# Patient Record
Sex: MALE | Race: ASIAN | NOT HISPANIC OR LATINO | URBAN - METROPOLITAN AREA
[De-identification: names, ages, dates, MRNs, and addresses within clinical notes are randomized per-mention and may not be internally consistent; named-entity substitution may affect disease eponyms.]

---

## 2023-01-01 ENCOUNTER — INPATIENT (INPATIENT)
Facility: HOSPITAL | Age: 68
LOS: 5 days | DRG: 871 | End: 2023-08-27
Attending: STUDENT IN AN ORGANIZED HEALTH CARE EDUCATION/TRAINING PROGRAM | Admitting: STUDENT IN AN ORGANIZED HEALTH CARE EDUCATION/TRAINING PROGRAM
Payer: COMMERCIAL

## 2023-01-01 VITALS
HEIGHT: 69 IN | RESPIRATION RATE: 18 BRPM | WEIGHT: 121.25 LBS | DIASTOLIC BLOOD PRESSURE: 60 MMHG | TEMPERATURE: 98 F | HEART RATE: 96 BPM | SYSTOLIC BLOOD PRESSURE: 87 MMHG | OXYGEN SATURATION: 98 %

## 2023-01-01 DIAGNOSIS — C22.0 LIVER CELL CARCINOMA: ICD-10-CM

## 2023-01-01 DIAGNOSIS — E78.5 HYPERLIPIDEMIA, UNSPECIFIED: ICD-10-CM

## 2023-01-01 DIAGNOSIS — N17.9 ACUTE KIDNEY FAILURE, UNSPECIFIED: ICD-10-CM

## 2023-01-01 DIAGNOSIS — I10 ESSENTIAL (PRIMARY) HYPERTENSION: ICD-10-CM

## 2023-01-01 DIAGNOSIS — K92.1 MELENA: ICD-10-CM

## 2023-01-01 DIAGNOSIS — C22.9 MALIGNANT NEOPLASM OF LIVER, NOT SPECIFIED AS PRIMARY OR SECONDARY: ICD-10-CM

## 2023-01-01 DIAGNOSIS — I50.9 HEART FAILURE, UNSPECIFIED: ICD-10-CM

## 2023-01-01 DIAGNOSIS — Z87.898 PERSONAL HISTORY OF OTHER SPECIFIED CONDITIONS: ICD-10-CM

## 2023-01-01 DIAGNOSIS — A41.9 SEPSIS, UNSPECIFIED ORGANISM: ICD-10-CM

## 2023-01-01 DIAGNOSIS — E87.1 HYPO-OSMOLALITY AND HYPONATREMIA: ICD-10-CM

## 2023-01-01 DIAGNOSIS — Z29.9 ENCOUNTER FOR PROPHYLACTIC MEASURES, UNSPECIFIED: ICD-10-CM

## 2023-01-01 DIAGNOSIS — K74.60 UNSPECIFIED CIRRHOSIS OF LIVER: ICD-10-CM

## 2023-01-01 DIAGNOSIS — R06.2 WHEEZING: ICD-10-CM

## 2023-01-01 DIAGNOSIS — Z87.19 PERSONAL HISTORY OF OTHER DISEASES OF THE DIGESTIVE SYSTEM: Chronic | ICD-10-CM

## 2023-01-01 DIAGNOSIS — E11.9 TYPE 2 DIABETES MELLITUS WITHOUT COMPLICATIONS: ICD-10-CM

## 2023-01-01 LAB
A1C WITH ESTIMATED AVERAGE GLUCOSE RESULT: 9.4 % — HIGH (ref 4–5.6)
AFP-TM SERPL-MCNC: 4.2 NG/ML — SIGNIFICANT CHANGE UP
ALBUMIN FLD-MCNC: 0.9 G/DL — SIGNIFICANT CHANGE UP
ALBUMIN SERPL ELPH-MCNC: 1.7 G/DL — LOW (ref 3.3–5)
ALBUMIN SERPL ELPH-MCNC: 1.9 G/DL — LOW (ref 3.3–5)
ALBUMIN SERPL ELPH-MCNC: 2 G/DL — LOW (ref 3.3–5)
ALBUMIN SERPL ELPH-MCNC: 2.1 G/DL — LOW (ref 3.3–5)
ALBUMIN SERPL ELPH-MCNC: 2.2 G/DL — LOW (ref 3.3–5)
ALP SERPL-CCNC: 110 U/L — SIGNIFICANT CHANGE UP (ref 40–120)
ALP SERPL-CCNC: 120 U/L — SIGNIFICANT CHANGE UP (ref 40–120)
ALP SERPL-CCNC: 129 U/L — HIGH (ref 40–120)
ALP SERPL-CCNC: 133 U/L — HIGH (ref 40–120)
ALP SERPL-CCNC: 138 U/L — HIGH (ref 40–120)
ALP SERPL-CCNC: 151 U/L — HIGH (ref 40–120)
ALP SERPL-CCNC: 163 U/L — HIGH (ref 40–120)
ALP SERPL-CCNC: 178 U/L — HIGH (ref 40–120)
ALP SERPL-CCNC: 186 U/L — HIGH (ref 40–120)
ALT FLD-CCNC: 1105 U/L — HIGH (ref 12–78)
ALT FLD-CCNC: 2307 U/L — HIGH (ref 12–78)
ALT FLD-CCNC: 270 U/L — HIGH (ref 12–78)
ALT FLD-CCNC: 30 U/L — SIGNIFICANT CHANGE UP (ref 12–78)
ALT FLD-CCNC: 32 U/L — SIGNIFICANT CHANGE UP (ref 12–78)
ALT FLD-CCNC: 35 U/L — SIGNIFICANT CHANGE UP (ref 12–78)
ALT FLD-CCNC: 36 U/L — SIGNIFICANT CHANGE UP (ref 12–78)
ALT FLD-CCNC: 36 U/L — SIGNIFICANT CHANGE UP (ref 12–78)
ALT FLD-CCNC: 699 U/L — HIGH (ref 12–78)
AMMONIA BLD-MCNC: 175 UMOL/L — HIGH (ref 11–32)
ANION GAP SERPL CALC-SCNC: 10 MMOL/L — SIGNIFICANT CHANGE UP (ref 5–17)
ANION GAP SERPL CALC-SCNC: 13 MMOL/L — SIGNIFICANT CHANGE UP (ref 5–17)
ANION GAP SERPL CALC-SCNC: 15 MMOL/L — SIGNIFICANT CHANGE UP (ref 5–17)
ANION GAP SERPL CALC-SCNC: 15 MMOL/L — SIGNIFICANT CHANGE UP (ref 5–17)
ANION GAP SERPL CALC-SCNC: 16 MMOL/L — SIGNIFICANT CHANGE UP (ref 5–17)
ANION GAP SERPL CALC-SCNC: 17 MMOL/L — SIGNIFICANT CHANGE UP (ref 5–17)
ANION GAP SERPL CALC-SCNC: 6 MMOL/L — SIGNIFICANT CHANGE UP (ref 5–17)
ANION GAP SERPL CALC-SCNC: 6 MMOL/L — SIGNIFICANT CHANGE UP (ref 5–17)
ANION GAP SERPL CALC-SCNC: 7 MMOL/L — SIGNIFICANT CHANGE UP (ref 5–17)
ANION GAP SERPL CALC-SCNC: 8 MMOL/L — SIGNIFICANT CHANGE UP (ref 5–17)
ANION GAP SERPL CALC-SCNC: 8 MMOL/L — SIGNIFICANT CHANGE UP (ref 5–17)
APPEARANCE UR: CLEAR — SIGNIFICANT CHANGE UP
APPEARANCE UR: CLEAR — SIGNIFICANT CHANGE UP
APTT BLD: 26.8 SEC — SIGNIFICANT CHANGE UP (ref 24.5–35.6)
APTT BLD: 35.7 SEC — HIGH (ref 24.5–35.6)
AST SERPL-CCNC: 2350 U/L — HIGH (ref 15–37)
AST SERPL-CCNC: 3962 U/L — HIGH (ref 15–37)
AST SERPL-CCNC: 51 U/L — HIGH (ref 15–37)
AST SERPL-CCNC: 51 U/L — HIGH (ref 15–37)
AST SERPL-CCNC: 52 U/L — HIGH (ref 15–37)
AST SERPL-CCNC: 54 U/L — HIGH (ref 15–37)
AST SERPL-CCNC: 57 U/L — HIGH (ref 15–37)
AST SERPL-CCNC: 761 U/L — HIGH (ref 15–37)
AST SERPL-CCNC: 9511 U/L — HIGH (ref 15–37)
B PERT IGG+IGM PNL SER: ABNORMAL
BASE EXCESS BLDA CALC-SCNC: -11.6 MMOL/L — LOW (ref -2–3)
BASE EXCESS BLDA CALC-SCNC: -15.8 MMOL/L — LOW (ref -2–3)
BASE EXCESS BLDA CALC-SCNC: -9.7 MMOL/L — LOW (ref -2–3)
BASOPHILS # BLD AUTO: 0 K/UL — SIGNIFICANT CHANGE UP (ref 0–0.2)
BASOPHILS # BLD AUTO: 0.03 K/UL — SIGNIFICANT CHANGE UP (ref 0–0.2)
BASOPHILS # BLD AUTO: 0.1 K/UL — SIGNIFICANT CHANGE UP (ref 0–0.2)
BASOPHILS # BLD AUTO: 0.13 K/UL — SIGNIFICANT CHANGE UP (ref 0–0.2)
BASOPHILS # BLD AUTO: 0.13 K/UL — SIGNIFICANT CHANGE UP (ref 0–0.2)
BASOPHILS # BLD AUTO: 0.15 K/UL — SIGNIFICANT CHANGE UP (ref 0–0.2)
BASOPHILS NFR BLD AUTO: 0 % — SIGNIFICANT CHANGE UP (ref 0–2)
BASOPHILS NFR BLD AUTO: 0.1 % — SIGNIFICANT CHANGE UP (ref 0–2)
BASOPHILS NFR BLD AUTO: 0.6 % — SIGNIFICANT CHANGE UP (ref 0–2)
BASOPHILS NFR BLD AUTO: 0.9 % — SIGNIFICANT CHANGE UP (ref 0–2)
BASOPHILS NFR BLD AUTO: 0.9 % — SIGNIFICANT CHANGE UP (ref 0–2)
BASOPHILS NFR BLD AUTO: 1.2 % — SIGNIFICANT CHANGE UP (ref 0–2)
BILIRUB SERPL-MCNC: 0.6 MG/DL — SIGNIFICANT CHANGE UP (ref 0.2–1.2)
BILIRUB SERPL-MCNC: 0.8 MG/DL — SIGNIFICANT CHANGE UP (ref 0.2–1.2)
BILIRUB SERPL-MCNC: 0.9 MG/DL — SIGNIFICANT CHANGE UP (ref 0.2–1.2)
BILIRUB SERPL-MCNC: 1.1 MG/DL — SIGNIFICANT CHANGE UP (ref 0.2–1.2)
BILIRUB UR-MCNC: ABNORMAL
BILIRUB UR-MCNC: NEGATIVE — SIGNIFICANT CHANGE UP
BLOOD GAS COMMENTS ARTERIAL: SIGNIFICANT CHANGE UP
BUN SERPL-MCNC: 25 MG/DL — HIGH (ref 7–23)
BUN SERPL-MCNC: 25 MG/DL — HIGH (ref 7–23)
BUN SERPL-MCNC: 26 MG/DL — HIGH (ref 7–23)
BUN SERPL-MCNC: 32 MG/DL — HIGH (ref 7–23)
BUN SERPL-MCNC: 39 MG/DL — HIGH (ref 7–23)
BUN SERPL-MCNC: 42 MG/DL — HIGH (ref 7–23)
BUN SERPL-MCNC: 51 MG/DL — HIGH (ref 7–23)
BUN SERPL-MCNC: 68 MG/DL — HIGH (ref 7–23)
BUN SERPL-MCNC: 71 MG/DL — HIGH (ref 7–23)
BUN SERPL-MCNC: 71 MG/DL — HIGH (ref 7–23)
BUN SERPL-MCNC: 74 MG/DL — HIGH (ref 7–23)
CALCIUM SERPL-MCNC: 7.7 MG/DL — LOW (ref 8.5–10.1)
CALCIUM SERPL-MCNC: 8 MG/DL — LOW (ref 8.5–10.1)
CALCIUM SERPL-MCNC: 8.1 MG/DL — LOW (ref 8.5–10.1)
CALCIUM SERPL-MCNC: 8.2 MG/DL — LOW (ref 8.5–10.1)
CALCIUM SERPL-MCNC: 8.3 MG/DL — LOW (ref 8.5–10.1)
CALCIUM SERPL-MCNC: 8.4 MG/DL — LOW (ref 8.5–10.1)
CALCIUM SERPL-MCNC: 8.5 MG/DL — SIGNIFICANT CHANGE UP (ref 8.5–10.1)
CALCIUM SERPL-MCNC: 8.7 MG/DL — SIGNIFICANT CHANGE UP (ref 8.5–10.1)
CALCIUM SERPL-MCNC: 9 MG/DL — SIGNIFICANT CHANGE UP (ref 8.5–10.1)
CHLORIDE SERPL-SCNC: 100 MMOL/L — SIGNIFICANT CHANGE UP (ref 96–108)
CHLORIDE SERPL-SCNC: 100 MMOL/L — SIGNIFICANT CHANGE UP (ref 96–108)
CHLORIDE SERPL-SCNC: 89 MMOL/L — LOW (ref 96–108)
CHLORIDE SERPL-SCNC: 93 MMOL/L — LOW (ref 96–108)
CHLORIDE SERPL-SCNC: 94 MMOL/L — LOW (ref 96–108)
CHLORIDE SERPL-SCNC: 95 MMOL/L — LOW (ref 96–108)
CHLORIDE SERPL-SCNC: 98 MMOL/L — SIGNIFICANT CHANGE UP (ref 96–108)
CHOLEST SERPL-MCNC: 97 MG/DL — SIGNIFICANT CHANGE UP
CO2 SERPL-SCNC: 11 MMOL/L — LOW (ref 22–31)
CO2 SERPL-SCNC: 12 MMOL/L — LOW (ref 22–31)
CO2 SERPL-SCNC: 16 MMOL/L — LOW (ref 22–31)
CO2 SERPL-SCNC: 18 MMOL/L — LOW (ref 22–31)
CO2 SERPL-SCNC: 19 MMOL/L — LOW (ref 22–31)
CO2 SERPL-SCNC: 20 MMOL/L — LOW (ref 22–31)
CO2 SERPL-SCNC: 22 MMOL/L — SIGNIFICANT CHANGE UP (ref 22–31)
CO2 SERPL-SCNC: 22 MMOL/L — SIGNIFICANT CHANGE UP (ref 22–31)
CO2 SERPL-SCNC: 24 MMOL/L — SIGNIFICANT CHANGE UP (ref 22–31)
COLOR FLD: YELLOW — SIGNIFICANT CHANGE UP
COLOR SPEC: SIGNIFICANT CHANGE UP
COLOR SPEC: SIGNIFICANT CHANGE UP
CREAT ?TM UR-MCNC: 143 MG/DL — SIGNIFICANT CHANGE UP
CREAT SERPL-MCNC: 1.4 MG/DL — HIGH (ref 0.5–1.3)
CREAT SERPL-MCNC: 1.5 MG/DL — HIGH (ref 0.5–1.3)
CREAT SERPL-MCNC: 1.7 MG/DL — HIGH (ref 0.5–1.3)
CREAT SERPL-MCNC: 1.8 MG/DL — HIGH (ref 0.5–1.3)
CREAT SERPL-MCNC: 1.8 MG/DL — HIGH (ref 0.5–1.3)
CREAT SERPL-MCNC: 1.9 MG/DL — HIGH (ref 0.5–1.3)
CREAT SERPL-MCNC: 2.1 MG/DL — HIGH (ref 0.5–1.3)
CREAT SERPL-MCNC: 2.7 MG/DL — HIGH (ref 0.5–1.3)
CREAT SERPL-MCNC: 3.3 MG/DL — HIGH (ref 0.5–1.3)
CREAT SERPL-MCNC: 3.6 MG/DL — HIGH (ref 0.5–1.3)
CREAT SERPL-MCNC: 3.8 MG/DL — HIGH (ref 0.5–1.3)
DIFF PNL FLD: NEGATIVE — SIGNIFICANT CHANGE UP
DIFF PNL FLD: NEGATIVE — SIGNIFICANT CHANGE UP
DIGOXIN SERPL-MCNC: 0.5 NG/ML — LOW (ref 0.8–2)
DIGOXIN SERPL-MCNC: 0.8 NG/ML — SIGNIFICANT CHANGE UP (ref 0.8–2)
DIGOXIN SERPL-MCNC: 1 NG/ML — SIGNIFICANT CHANGE UP (ref 0.8–2)
EGFR: 17 ML/MIN/1.73M2 — LOW
EGFR: 18 ML/MIN/1.73M2 — LOW
EGFR: 20 ML/MIN/1.73M2 — LOW
EGFR: 25 ML/MIN/1.73M2 — LOW
EGFR: 34 ML/MIN/1.73M2 — LOW
EGFR: 38 ML/MIN/1.73M2 — LOW
EGFR: 41 ML/MIN/1.73M2 — LOW
EGFR: 41 ML/MIN/1.73M2 — LOW
EGFR: 44 ML/MIN/1.73M2 — LOW
EGFR: 51 ML/MIN/1.73M2 — LOW
EGFR: 55 ML/MIN/1.73M2 — LOW
EOSINOPHIL # BLD AUTO: 0.03 K/UL — SIGNIFICANT CHANGE UP (ref 0–0.5)
EOSINOPHIL # BLD AUTO: 0.09 K/UL — SIGNIFICANT CHANGE UP (ref 0–0.5)
EOSINOPHIL # BLD AUTO: 0.21 K/UL — SIGNIFICANT CHANGE UP (ref 0–0.5)
EOSINOPHIL # BLD AUTO: 0.31 K/UL — SIGNIFICANT CHANGE UP (ref 0–0.5)
EOSINOPHIL # BLD AUTO: 0.35 K/UL — SIGNIFICANT CHANGE UP (ref 0–0.5)
EOSINOPHIL # BLD AUTO: 0.38 K/UL — SIGNIFICANT CHANGE UP (ref 0–0.5)
EOSINOPHIL # BLD AUTO: 0.84 K/UL — HIGH (ref 0–0.5)
EOSINOPHIL # BLD AUTO: 1.12 K/UL — HIGH (ref 0–0.5)
EOSINOPHIL # FLD: 0 % — SIGNIFICANT CHANGE UP
EOSINOPHIL NFR BLD AUTO: 0.2 % — SIGNIFICANT CHANGE UP (ref 0–6)
EOSINOPHIL NFR BLD AUTO: 0.4 % — SIGNIFICANT CHANGE UP (ref 0–6)
EOSINOPHIL NFR BLD AUTO: 1 % — SIGNIFICANT CHANGE UP (ref 0–6)
EOSINOPHIL NFR BLD AUTO: 2 % — SIGNIFICANT CHANGE UP (ref 0–6)
EOSINOPHIL NFR BLD AUTO: 2.4 % — SIGNIFICANT CHANGE UP (ref 0–6)
EOSINOPHIL NFR BLD AUTO: 2.5 % — SIGNIFICANT CHANGE UP (ref 0–6)
EOSINOPHIL NFR BLD AUTO: 6 % — SIGNIFICANT CHANGE UP (ref 0–6)
EOSINOPHIL NFR BLD AUTO: 8 % — HIGH (ref 0–6)
ESTIMATED AVERAGE GLUCOSE: 223 MG/DL — HIGH (ref 68–114)
FLUAV AG NPH QL: SIGNIFICANT CHANGE UP
FLUBV AG NPH QL: SIGNIFICANT CHANGE UP
FLUID INTAKE SUBSTANCE CLASS: SIGNIFICANT CHANGE UP
FOLATE+VIT B12 SERBLD-IMP: 0 % — SIGNIFICANT CHANGE UP
GAS PNL BLDA: SIGNIFICANT CHANGE UP
GLUCOSE SERPL-MCNC: 138 MG/DL — HIGH (ref 70–99)
GLUCOSE SERPL-MCNC: 150 MG/DL — HIGH (ref 70–99)
GLUCOSE SERPL-MCNC: 154 MG/DL — HIGH (ref 70–99)
GLUCOSE SERPL-MCNC: 161 MG/DL — HIGH (ref 70–99)
GLUCOSE SERPL-MCNC: 171 MG/DL — HIGH (ref 70–99)
GLUCOSE SERPL-MCNC: 195 MG/DL — HIGH (ref 70–99)
GLUCOSE SERPL-MCNC: 195 MG/DL — HIGH (ref 70–99)
GLUCOSE SERPL-MCNC: 279 MG/DL — HIGH (ref 70–99)
GLUCOSE SERPL-MCNC: 362 MG/DL — HIGH (ref 70–99)
GLUCOSE SERPL-MCNC: 450 MG/DL — CRITICAL HIGH (ref 70–99)
GLUCOSE SERPL-MCNC: 48 MG/DL — CRITICAL LOW (ref 70–99)
GLUCOSE UR QL: 250 MG/DL
GLUCOSE UR QL: >=1000 MG/DL
GRAM STN FLD: SIGNIFICANT CHANGE UP
HCO3 BLDA-SCNC: 14 MMOL/L — LOW (ref 21–28)
HCO3 BLDA-SCNC: 17 MMOL/L — LOW (ref 21–28)
HCO3 BLDA-SCNC: 19 MMOL/L — LOW (ref 21–28)
HCT VFR BLD CALC: 35.5 % — LOW (ref 39–50)
HCT VFR BLD CALC: 37.2 % — LOW (ref 39–50)
HCT VFR BLD CALC: 38.3 % — LOW (ref 39–50)
HCT VFR BLD CALC: 39.8 % — SIGNIFICANT CHANGE UP (ref 39–50)
HCT VFR BLD CALC: 41.8 % — SIGNIFICANT CHANGE UP (ref 39–50)
HCT VFR BLD CALC: 42.2 % — SIGNIFICANT CHANGE UP (ref 39–50)
HCT VFR BLD CALC: 43.9 % — SIGNIFICANT CHANGE UP (ref 39–50)
HCT VFR BLD CALC: 44.1 % — SIGNIFICANT CHANGE UP (ref 39–50)
HCT VFR BLD CALC: 46.5 % — SIGNIFICANT CHANGE UP (ref 39–50)
HCV AB S/CO SERPL IA: 0.28 S/CO — SIGNIFICANT CHANGE UP (ref 0–0.99)
HCV AB SERPL-IMP: SIGNIFICANT CHANGE UP
HDLC SERPL-MCNC: 34 MG/DL — LOW
HGB BLD-MCNC: 11.6 G/DL — LOW (ref 13–17)
HGB BLD-MCNC: 12.1 G/DL — LOW (ref 13–17)
HGB BLD-MCNC: 12.9 G/DL — LOW (ref 13–17)
HGB BLD-MCNC: 12.9 G/DL — LOW (ref 13–17)
HGB BLD-MCNC: 14.1 G/DL — SIGNIFICANT CHANGE UP (ref 13–17)
HGB BLD-MCNC: 14.3 G/DL — SIGNIFICANT CHANGE UP (ref 13–17)
HGB BLD-MCNC: 14.3 G/DL — SIGNIFICANT CHANGE UP (ref 13–17)
HGB BLD-MCNC: 14.6 G/DL — SIGNIFICANT CHANGE UP (ref 13–17)
HGB BLD-MCNC: 15.4 G/DL — SIGNIFICANT CHANGE UP (ref 13–17)
HOROWITZ INDEX BLDA+IHG-RTO: 100 — SIGNIFICANT CHANGE UP
HOROWITZ INDEX BLDA+IHG-RTO: 32 — SIGNIFICANT CHANGE UP
IMM GRANULOCYTES NFR BLD AUTO: 0.5 % — SIGNIFICANT CHANGE UP (ref 0–0.9)
IMM GRANULOCYTES NFR BLD AUTO: 0.6 % — SIGNIFICANT CHANGE UP (ref 0–0.9)
IMM GRANULOCYTES NFR BLD AUTO: 0.9 % — SIGNIFICANT CHANGE UP (ref 0–0.9)
IMM GRANULOCYTES NFR BLD AUTO: 6.3 % — HIGH (ref 0–0.9)
IMM GRANULOCYTES NFR BLD AUTO: 7.9 % — HIGH (ref 0–0.9)
INR BLD: 1.11 RATIO — SIGNIFICANT CHANGE UP (ref 0.85–1.18)
INR BLD: 1.11 RATIO — SIGNIFICANT CHANGE UP (ref 0.85–1.18)
INR BLD: 2.04 RATIO — HIGH (ref 0.85–1.18)
KETONES UR-MCNC: NEGATIVE MG/DL — SIGNIFICANT CHANGE UP
KETONES UR-MCNC: NEGATIVE MG/DL — SIGNIFICANT CHANGE UP
LACTATE SERPL-SCNC: 1.7 MMOL/L — SIGNIFICANT CHANGE UP (ref 0.7–2)
LACTATE SERPL-SCNC: 3 MMOL/L — HIGH (ref 0.7–2)
LACTATE SERPL-SCNC: 7.3 MMOL/L — CRITICAL HIGH (ref 0.7–2)
LACTATE SERPL-SCNC: 9 MMOL/L — CRITICAL HIGH (ref 0.7–2)
LACTATE SERPL-SCNC: 9.1 MMOL/L — CRITICAL HIGH (ref 0.7–2)
LDH SERPL L TO P-CCNC: 157 U/L — SIGNIFICANT CHANGE UP
LEUKOCYTE ESTERASE UR-ACNC: ABNORMAL
LEUKOCYTE ESTERASE UR-ACNC: NEGATIVE — SIGNIFICANT CHANGE UP
LIDOCAIN IGE QN: 303 U/L — SIGNIFICANT CHANGE UP (ref 73–393)
LIPID PNL WITH DIRECT LDL SERPL: 47 MG/DL — SIGNIFICANT CHANGE UP
LYMPHOCYTES # BLD AUTO: 1.46 K/UL — SIGNIFICANT CHANGE UP (ref 1–3.3)
LYMPHOCYTES # BLD AUTO: 1.48 K/UL — SIGNIFICANT CHANGE UP (ref 1–3.3)
LYMPHOCYTES # BLD AUTO: 1.65 K/UL — SIGNIFICANT CHANGE UP (ref 1–3.3)
LYMPHOCYTES # BLD AUTO: 1.67 K/UL — SIGNIFICANT CHANGE UP (ref 1–3.3)
LYMPHOCYTES # BLD AUTO: 1.82 K/UL — SIGNIFICANT CHANGE UP (ref 1–3.3)
LYMPHOCYTES # BLD AUTO: 1.95 K/UL — SIGNIFICANT CHANGE UP (ref 1–3.3)
LYMPHOCYTES # BLD AUTO: 10 % — LOW (ref 13–44)
LYMPHOCYTES # BLD AUTO: 13 % — SIGNIFICANT CHANGE UP (ref 13–44)
LYMPHOCYTES # BLD AUTO: 13 % — SIGNIFICANT CHANGE UP (ref 13–44)
LYMPHOCYTES # BLD AUTO: 13.2 % — SIGNIFICANT CHANGE UP (ref 13–44)
LYMPHOCYTES # BLD AUTO: 13.9 % — SIGNIFICANT CHANGE UP (ref 13–44)
LYMPHOCYTES # BLD AUTO: 2.1 K/UL — SIGNIFICANT CHANGE UP (ref 1–3.3)
LYMPHOCYTES # BLD AUTO: 2.46 K/UL — SIGNIFICANT CHANGE UP (ref 1–3.3)
LYMPHOCYTES # BLD AUTO: 8 % — LOW (ref 13–44)
LYMPHOCYTES # BLD AUTO: 8.1 % — LOW (ref 13–44)
LYMPHOCYTES # BLD AUTO: 9.5 % — LOW (ref 13–44)
LYMPHOCYTES # FLD: 18 % — SIGNIFICANT CHANGE UP
MAGNESIUM SERPL-MCNC: 2.3 MG/DL — SIGNIFICANT CHANGE UP (ref 1.6–2.6)
MAGNESIUM SERPL-MCNC: 2.4 MG/DL — SIGNIFICANT CHANGE UP (ref 1.6–2.6)
MAGNESIUM SERPL-MCNC: 2.6 MG/DL — SIGNIFICANT CHANGE UP (ref 1.6–2.6)
MAGNESIUM SERPL-MCNC: 3.1 MG/DL — HIGH (ref 1.6–2.6)
MAGNESIUM SERPL-MCNC: 3.5 MG/DL — HIGH (ref 1.6–2.6)
MCHC RBC-ENTMCNC: 26.9 PG — LOW (ref 27–34)
MCHC RBC-ENTMCNC: 27 PG — SIGNIFICANT CHANGE UP (ref 27–34)
MCHC RBC-ENTMCNC: 27.1 PG — SIGNIFICANT CHANGE UP (ref 27–34)
MCHC RBC-ENTMCNC: 27.2 PG — SIGNIFICANT CHANGE UP (ref 27–34)
MCHC RBC-ENTMCNC: 27.3 PG — SIGNIFICANT CHANGE UP (ref 27–34)
MCHC RBC-ENTMCNC: 32.4 GM/DL — SIGNIFICANT CHANGE UP (ref 32–36)
MCHC RBC-ENTMCNC: 32.5 GM/DL — SIGNIFICANT CHANGE UP (ref 32–36)
MCHC RBC-ENTMCNC: 32.6 GM/DL — SIGNIFICANT CHANGE UP (ref 32–36)
MCHC RBC-ENTMCNC: 32.7 GM/DL — SIGNIFICANT CHANGE UP (ref 32–36)
MCHC RBC-ENTMCNC: 33.1 GM/DL — SIGNIFICANT CHANGE UP (ref 32–36)
MCHC RBC-ENTMCNC: 33.1 GM/DL — SIGNIFICANT CHANGE UP (ref 32–36)
MCHC RBC-ENTMCNC: 33.7 GM/DL — SIGNIFICANT CHANGE UP (ref 32–36)
MCHC RBC-ENTMCNC: 33.7 GM/DL — SIGNIFICANT CHANGE UP (ref 32–36)
MCHC RBC-ENTMCNC: 33.9 GM/DL — SIGNIFICANT CHANGE UP (ref 32–36)
MCV RBC AUTO: 79.6 FL — LOW (ref 80–100)
MCV RBC AUTO: 80.5 FL — SIGNIFICANT CHANGE UP (ref 80–100)
MCV RBC AUTO: 80.6 FL — SIGNIFICANT CHANGE UP (ref 80–100)
MCV RBC AUTO: 81.8 FL — SIGNIFICANT CHANGE UP (ref 80–100)
MCV RBC AUTO: 82.2 FL — SIGNIFICANT CHANGE UP (ref 80–100)
MCV RBC AUTO: 83.1 FL — SIGNIFICANT CHANGE UP (ref 80–100)
MCV RBC AUTO: 83.3 FL — SIGNIFICANT CHANGE UP (ref 80–100)
MCV RBC AUTO: 83.3 FL — SIGNIFICANT CHANGE UP (ref 80–100)
MCV RBC AUTO: 83.4 FL — SIGNIFICANT CHANGE UP (ref 80–100)
MELD SCORE WITH DIALYSIS: 29 POINTS — SIGNIFICANT CHANGE UP
MELD SCORE WITHOUT DIALYSIS: 24 POINTS — SIGNIFICANT CHANGE UP
MESOTHL CELL # FLD: 5 % — SIGNIFICANT CHANGE UP
MONOCYTES # BLD AUTO: 0.57 K/UL — SIGNIFICANT CHANGE UP (ref 0–0.9)
MONOCYTES # BLD AUTO: 1.04 K/UL — HIGH (ref 0–0.9)
MONOCYTES # BLD AUTO: 1.26 K/UL — HIGH (ref 0–0.9)
MONOCYTES # BLD AUTO: 1.42 K/UL — HIGH (ref 0–0.9)
MONOCYTES # BLD AUTO: 1.61 K/UL — HIGH (ref 0–0.9)
MONOCYTES # BLD AUTO: 2.08 K/UL — HIGH (ref 0–0.9)
MONOCYTES # BLD AUTO: 2.46 K/UL — HIGH (ref 0–0.9)
MONOCYTES # BLD AUTO: 2.62 K/UL — HIGH (ref 0–0.9)
MONOCYTES NFR BLD AUTO: 11.4 % — SIGNIFICANT CHANGE UP (ref 2–14)
MONOCYTES NFR BLD AUTO: 11.5 % — SIGNIFICANT CHANGE UP (ref 2–14)
MONOCYTES NFR BLD AUTO: 11.9 % — SIGNIFICANT CHANGE UP (ref 2–14)
MONOCYTES NFR BLD AUTO: 13 % — SIGNIFICANT CHANGE UP (ref 2–14)
MONOCYTES NFR BLD AUTO: 14 % — SIGNIFICANT CHANGE UP (ref 2–14)
MONOCYTES NFR BLD AUTO: 3.1 % — SIGNIFICANT CHANGE UP (ref 2–14)
MONOCYTES NFR BLD AUTO: 5 % — SIGNIFICANT CHANGE UP (ref 2–14)
MONOCYTES NFR BLD AUTO: 9 % — SIGNIFICANT CHANGE UP (ref 2–14)
MONOS+MACROS # FLD: 52 % — SIGNIFICANT CHANGE UP
MRSA PCR RESULT.: SIGNIFICANT CHANGE UP
NEUTROPHILS # BLD AUTO: 10.09 K/UL — HIGH (ref 1.8–7.4)
NEUTROPHILS # BLD AUTO: 10.69 K/UL — HIGH (ref 1.8–7.4)
NEUTROPHILS # BLD AUTO: 13.6 K/UL — HIGH (ref 1.8–7.4)
NEUTROPHILS # BLD AUTO: 14.82 K/UL — HIGH (ref 1.8–7.4)
NEUTROPHILS # BLD AUTO: 15.51 K/UL — HIGH (ref 1.8–7.4)
NEUTROPHILS # BLD AUTO: 17.76 K/UL — HIGH (ref 1.8–7.4)
NEUTROPHILS # BLD AUTO: 8.86 K/UL — HIGH (ref 1.8–7.4)
NEUTROPHILS # BLD AUTO: 9.18 K/UL — HIGH (ref 1.8–7.4)
NEUTROPHILS NFR BLD AUTO: 65.2 % — SIGNIFICANT CHANGE UP (ref 43–77)
NEUTROPHILS NFR BLD AUTO: 70.2 % — SIGNIFICANT CHANGE UP (ref 43–77)
NEUTROPHILS NFR BLD AUTO: 71.1 % — SIGNIFICANT CHANGE UP (ref 43–77)
NEUTROPHILS NFR BLD AUTO: 71.8 % — SIGNIFICANT CHANGE UP (ref 43–77)
NEUTROPHILS NFR BLD AUTO: 72 % — SIGNIFICANT CHANGE UP (ref 43–77)
NEUTROPHILS NFR BLD AUTO: 72 % — SIGNIFICANT CHANGE UP (ref 43–77)
NEUTROPHILS NFR BLD AUTO: 81.7 % — HIGH (ref 43–77)
NEUTROPHILS NFR BLD AUTO: 85 % — HIGH (ref 43–77)
NEUTROPHILS-BODY FLUID: 25 % — SIGNIFICANT CHANGE UP
NITRITE UR-MCNC: NEGATIVE — SIGNIFICANT CHANGE UP
NITRITE UR-MCNC: NEGATIVE — SIGNIFICANT CHANGE UP
NON HDL CHOLESTEROL: 62 MG/DL — SIGNIFICANT CHANGE UP
NRBC # BLD: 0 /100 WBCS — SIGNIFICANT CHANGE UP (ref 0–0)
NRBC # BLD: SIGNIFICANT CHANGE UP /100 WBCS (ref 0–0)
NRBC # FLD: 0 % — SIGNIFICANT CHANGE UP
OB PNL STL: POSITIVE
OSMOLALITY UR: 539 MOSM/KG — SIGNIFICANT CHANGE UP (ref 50–1200)
OTHER CELLS FLD MANUAL: 0 % — SIGNIFICANT CHANGE UP
PCO2 BLDA: 45 MMHG — SIGNIFICANT CHANGE UP (ref 35–48)
PCO2 BLDA: 48 MMHG — SIGNIFICANT CHANGE UP (ref 35–48)
PCO2 BLDA: 50 MMHG — HIGH (ref 35–48)
PH BLDA: 7.08 — CRITICAL LOW (ref 7.35–7.45)
PH BLDA: 7.18 — CRITICAL LOW (ref 7.35–7.45)
PH BLDA: 7.18 — CRITICAL LOW (ref 7.35–7.45)
PH UR: 5 — SIGNIFICANT CHANGE UP (ref 5–8)
PH UR: 5.5 — SIGNIFICANT CHANGE UP (ref 5–8)
PHOSPHATE SERPL-MCNC: 3.7 MG/DL — SIGNIFICANT CHANGE UP (ref 2.5–4.5)
PHOSPHATE SERPL-MCNC: 4.2 MG/DL — SIGNIFICANT CHANGE UP (ref 2.5–4.5)
PHOSPHATE SERPL-MCNC: 4.3 MG/DL — SIGNIFICANT CHANGE UP (ref 2.5–4.5)
PHOSPHATE SERPL-MCNC: 4.4 MG/DL — SIGNIFICANT CHANGE UP (ref 2.5–4.5)
PHOSPHATE SERPL-MCNC: 9 MG/DL — SIGNIFICANT CHANGE UP (ref 2.5–4.5)
PLATELET # BLD AUTO: 440 K/UL — HIGH (ref 150–400)
PLATELET # BLD AUTO: 459 K/UL — HIGH (ref 150–400)
PLATELET # BLD AUTO: 459 K/UL — HIGH (ref 150–400)
PLATELET # BLD AUTO: 462 K/UL — HIGH (ref 150–400)
PLATELET # BLD AUTO: 468 K/UL — HIGH (ref 150–400)
PLATELET # BLD AUTO: 471 K/UL — HIGH (ref 150–400)
PLATELET # BLD AUTO: 520 K/UL — HIGH (ref 150–400)
PLATELET # BLD AUTO: 532 K/UL — HIGH (ref 150–400)
PLATELET # BLD AUTO: 536 K/UL — HIGH (ref 150–400)
PO2 BLDA: 358 MMHG — HIGH (ref 83–108)
PO2 BLDA: 73 MMHG — LOW (ref 83–108)
PO2 BLDA: 98 MMHG — SIGNIFICANT CHANGE UP (ref 83–108)
POTASSIUM SERPL-MCNC: 4.6 MMOL/L — SIGNIFICANT CHANGE UP (ref 3.5–5.3)
POTASSIUM SERPL-MCNC: 4.7 MMOL/L — SIGNIFICANT CHANGE UP (ref 3.5–5.3)
POTASSIUM SERPL-MCNC: 4.8 MMOL/L — SIGNIFICANT CHANGE UP (ref 3.5–5.3)
POTASSIUM SERPL-MCNC: 4.9 MMOL/L — SIGNIFICANT CHANGE UP (ref 3.5–5.3)
POTASSIUM SERPL-MCNC: 5.2 MMOL/L — SIGNIFICANT CHANGE UP (ref 3.5–5.3)
POTASSIUM SERPL-MCNC: 5.3 MMOL/L — SIGNIFICANT CHANGE UP (ref 3.5–5.3)
POTASSIUM SERPL-MCNC: 5.3 MMOL/L — SIGNIFICANT CHANGE UP (ref 3.5–5.3)
POTASSIUM SERPL-MCNC: 5.5 MMOL/L — HIGH (ref 3.5–5.3)
POTASSIUM SERPL-MCNC: 5.6 MMOL/L — HIGH (ref 3.5–5.3)
POTASSIUM SERPL-MCNC: 6.4 MMOL/L — CRITICAL HIGH (ref 3.5–5.3)
POTASSIUM SERPL-MCNC: 6.5 MMOL/L — CRITICAL HIGH (ref 3.5–5.3)
POTASSIUM SERPL-MCNC: 6.5 MMOL/L — CRITICAL HIGH (ref 3.5–5.3)
POTASSIUM SERPL-MCNC: 6.8 MMOL/L — CRITICAL HIGH (ref 3.5–5.3)
POTASSIUM SERPL-SCNC: 4.6 MMOL/L — SIGNIFICANT CHANGE UP (ref 3.5–5.3)
POTASSIUM SERPL-SCNC: 4.7 MMOL/L — SIGNIFICANT CHANGE UP (ref 3.5–5.3)
POTASSIUM SERPL-SCNC: 4.8 MMOL/L — SIGNIFICANT CHANGE UP (ref 3.5–5.3)
POTASSIUM SERPL-SCNC: 4.9 MMOL/L — SIGNIFICANT CHANGE UP (ref 3.5–5.3)
POTASSIUM SERPL-SCNC: 5.2 MMOL/L — SIGNIFICANT CHANGE UP (ref 3.5–5.3)
POTASSIUM SERPL-SCNC: 5.3 MMOL/L — SIGNIFICANT CHANGE UP (ref 3.5–5.3)
POTASSIUM SERPL-SCNC: 5.3 MMOL/L — SIGNIFICANT CHANGE UP (ref 3.5–5.3)
POTASSIUM SERPL-SCNC: 5.5 MMOL/L — HIGH (ref 3.5–5.3)
POTASSIUM SERPL-SCNC: 5.6 MMOL/L — HIGH (ref 3.5–5.3)
POTASSIUM SERPL-SCNC: 6.4 MMOL/L — CRITICAL HIGH (ref 3.5–5.3)
POTASSIUM SERPL-SCNC: 6.5 MMOL/L — CRITICAL HIGH (ref 3.5–5.3)
POTASSIUM SERPL-SCNC: 6.5 MMOL/L — CRITICAL HIGH (ref 3.5–5.3)
POTASSIUM SERPL-SCNC: 6.8 MMOL/L — CRITICAL HIGH (ref 3.5–5.3)
POTASSIUM UR-SCNC: 31.8 MMOL/L — SIGNIFICANT CHANGE UP
PROT ?TM UR-MCNC: 24 MG/DL — HIGH (ref 0–12)
PROT FLD-MCNC: 2.1 G/DL — SIGNIFICANT CHANGE UP
PROT SERPL-MCNC: 5.6 G/DL — LOW (ref 6–8.3)
PROT SERPL-MCNC: 6.4 G/DL — SIGNIFICANT CHANGE UP (ref 6–8.3)
PROT SERPL-MCNC: 7 G/DL — SIGNIFICANT CHANGE UP (ref 6–8.3)
PROT SERPL-MCNC: 7.1 G/DL — SIGNIFICANT CHANGE UP (ref 6–8.3)
PROT SERPL-MCNC: 7.3 G/DL — SIGNIFICANT CHANGE UP (ref 6–8.3)
PROT SERPL-MCNC: 7.4 G/DL — SIGNIFICANT CHANGE UP (ref 6–8.3)
PROT SERPL-MCNC: 7.5 G/DL — SIGNIFICANT CHANGE UP (ref 6–8.3)
PROT SERPL-MCNC: 7.8 G/DL — SIGNIFICANT CHANGE UP (ref 6–8.3)
PROT SERPL-MCNC: 7.8 G/DL — SIGNIFICANT CHANGE UP (ref 6–8.3)
PROT UR-MCNC: NEGATIVE MG/DL — SIGNIFICANT CHANGE UP
PROT UR-MCNC: SIGNIFICANT CHANGE UP MG/DL
PROT/CREAT UR-RTO: 0.2 RATIO — SIGNIFICANT CHANGE UP (ref 0–0.2)
PROTHROM AB SERPL-ACNC: 12.9 SEC — SIGNIFICANT CHANGE UP (ref 9.5–13)
PROTHROM AB SERPL-ACNC: 12.9 SEC — SIGNIFICANT CHANGE UP (ref 9.5–13)
PROTHROM AB SERPL-ACNC: 23.4 SEC — HIGH (ref 9.5–13)
RBC # BLD: 4.27 M/UL — SIGNIFICANT CHANGE UP (ref 4.2–5.8)
RBC # BLD: 4.46 M/UL — SIGNIFICANT CHANGE UP (ref 4.2–5.8)
RBC # BLD: 4.75 M/UL — SIGNIFICANT CHANGE UP (ref 4.2–5.8)
RBC # BLD: 4.78 M/UL — SIGNIFICANT CHANGE UP (ref 4.2–5.8)
RBC # BLD: 5.24 M/UL — SIGNIFICANT CHANGE UP (ref 4.2–5.8)
RBC # BLD: 5.25 M/UL — SIGNIFICANT CHANGE UP (ref 4.2–5.8)
RBC # BLD: 5.27 M/UL — SIGNIFICANT CHANGE UP (ref 4.2–5.8)
RBC # BLD: 5.39 M/UL — SIGNIFICANT CHANGE UP (ref 4.2–5.8)
RBC # BLD: 5.66 M/UL — SIGNIFICANT CHANGE UP (ref 4.2–5.8)
RBC # FLD: 14.2 % — SIGNIFICANT CHANGE UP (ref 10.3–14.5)
RBC # FLD: 14.2 % — SIGNIFICANT CHANGE UP (ref 10.3–14.5)
RBC # FLD: 14.5 % — SIGNIFICANT CHANGE UP (ref 10.3–14.5)
RBC # FLD: 14.6 % — HIGH (ref 10.3–14.5)
RBC # FLD: 14.7 % — HIGH (ref 10.3–14.5)
RBC # FLD: 14.8 % — HIGH (ref 10.3–14.5)
RBC # FLD: 14.9 % — HIGH (ref 10.3–14.5)
RBC # FLD: 15.2 % — HIGH (ref 10.3–14.5)
RBC # FLD: 15.5 % — HIGH (ref 10.3–14.5)
RCV VOL RI: 2000 /UL — HIGH (ref 0–0)
RSV RNA NPH QL NAA+NON-PROBE: SIGNIFICANT CHANGE UP
S AUREUS DNA NOSE QL NAA+PROBE: DETECTED
SAO2 % BLDA: 92.3 % — LOW (ref 94–98)
SAO2 % BLDA: 96.7 % — SIGNIFICANT CHANGE UP (ref 94–98)
SAO2 % BLDA: 99.3 % — HIGH (ref 94–98)
SARS-COV-2 RNA SPEC QL NAA+PROBE: SIGNIFICANT CHANGE UP
SODIUM SERPL-SCNC: 121 MMOL/L — LOW (ref 135–145)
SODIUM SERPL-SCNC: 121 MMOL/L — LOW (ref 135–145)
SODIUM SERPL-SCNC: 122 MMOL/L — LOW (ref 135–145)
SODIUM SERPL-SCNC: 124 MMOL/L — LOW (ref 135–145)
SODIUM SERPL-SCNC: 124 MMOL/L — LOW (ref 135–145)
SODIUM SERPL-SCNC: 125 MMOL/L — LOW (ref 135–145)
SODIUM SERPL-SCNC: 127 MMOL/L — LOW (ref 135–145)
SODIUM SERPL-SCNC: 128 MMOL/L — LOW (ref 135–145)
SODIUM SERPL-SCNC: 130 MMOL/L — LOW (ref 135–145)
SODIUM UR-SCNC: <20 MMOL/L — SIGNIFICANT CHANGE UP
SP GR SPEC: 1.06 — HIGH (ref 1–1.03)
SP GR SPEC: 1.06 — HIGH (ref 1–1.03)
SPECIMEN SOURCE: SIGNIFICANT CHANGE UP
TOTAL NUCLEATED CELL COUNT, BODY FLUID: 809 /UL — SIGNIFICANT CHANGE UP
TRIGL SERPL-MCNC: 69 MG/DL — SIGNIFICANT CHANGE UP
TSH SERPL-MCNC: 1.56 UIU/ML — SIGNIFICANT CHANGE UP (ref 0.36–3.74)
TUBE TYPE: SIGNIFICANT CHANGE UP
UROBILINOGEN FLD QL: 0.2 MG/DL — SIGNIFICANT CHANGE UP (ref 0.2–1)
UROBILINOGEN FLD QL: 1 MG/DL — SIGNIFICANT CHANGE UP (ref 0.2–1)
UUN UR-MCNC: 597 MG/DL — SIGNIFICANT CHANGE UP
WBC # BLD: 12.47 K/UL — HIGH (ref 3.8–10.5)
WBC # BLD: 14.02 K/UL — HIGH (ref 3.8–10.5)
WBC # BLD: 14.06 K/UL — HIGH (ref 3.8–10.5)
WBC # BLD: 14.87 K/UL — HIGH (ref 3.8–10.5)
WBC # BLD: 18.12 K/UL — HIGH (ref 3.8–10.5)
WBC # BLD: 18.89 K/UL — HIGH (ref 3.8–10.5)
WBC # BLD: 20.89 K/UL — HIGH (ref 3.8–10.5)
WBC # BLD: 22.1 K/UL — HIGH (ref 3.8–10.5)
WBC # BLD: 23.95 K/UL — HIGH (ref 3.8–10.5)
WBC # FLD AUTO: 12.47 K/UL — HIGH (ref 3.8–10.5)
WBC # FLD AUTO: 14.02 K/UL — HIGH (ref 3.8–10.5)
WBC # FLD AUTO: 14.06 K/UL — HIGH (ref 3.8–10.5)
WBC # FLD AUTO: 14.87 K/UL — HIGH (ref 3.8–10.5)
WBC # FLD AUTO: 18.12 K/UL — HIGH (ref 3.8–10.5)
WBC # FLD AUTO: 18.89 K/UL — HIGH (ref 3.8–10.5)
WBC # FLD AUTO: 20.89 K/UL — HIGH (ref 3.8–10.5)
WBC # FLD AUTO: 22.1 K/UL — HIGH (ref 3.8–10.5)
WBC # FLD AUTO: 23.95 K/UL — HIGH (ref 3.8–10.5)

## 2023-01-01 PROCEDURE — 74177 CT ABD & PELVIS W/CONTRAST: CPT | Mod: 26,MA

## 2023-01-01 PROCEDURE — 99233 SBSQ HOSP IP/OBS HIGH 50: CPT

## 2023-01-01 PROCEDURE — 82570 ASSAY OF URINE CREATININE: CPT

## 2023-01-01 PROCEDURE — 99232 SBSQ HOSP IP/OBS MODERATE 35: CPT

## 2023-01-01 PROCEDURE — 82042 OTHER SOURCE ALBUMIN QUAN EA: CPT

## 2023-01-01 PROCEDURE — 84100 ASSAY OF PHOSPHORUS: CPT

## 2023-01-01 PROCEDURE — 87040 BLOOD CULTURE FOR BACTERIA: CPT

## 2023-01-01 PROCEDURE — 82272 OCCULT BLD FECES 1-3 TESTS: CPT

## 2023-01-01 PROCEDURE — 99221 1ST HOSP IP/OBS SF/LOW 40: CPT

## 2023-01-01 PROCEDURE — 99222 1ST HOSP IP/OBS MODERATE 55: CPT

## 2023-01-01 PROCEDURE — 85027 COMPLETE CBC AUTOMATED: CPT

## 2023-01-01 PROCEDURE — 84443 ASSAY THYROID STIM HORMONE: CPT

## 2023-01-01 PROCEDURE — 80061 LIPID PANEL: CPT

## 2023-01-01 PROCEDURE — 84300 ASSAY OF URINE SODIUM: CPT

## 2023-01-01 PROCEDURE — 97162 PT EVAL MOD COMPLEX 30 MIN: CPT

## 2023-01-01 PROCEDURE — 99233 SBSQ HOSP IP/OBS HIGH 50: CPT | Mod: GC

## 2023-01-01 PROCEDURE — 80048 BASIC METABOLIC PNL TOTAL CA: CPT

## 2023-01-01 PROCEDURE — 83690 ASSAY OF LIPASE: CPT

## 2023-01-01 PROCEDURE — 71045 X-RAY EXAM CHEST 1 VIEW: CPT

## 2023-01-01 PROCEDURE — 49083 ABD PARACENTESIS W/IMAGING: CPT

## 2023-01-01 PROCEDURE — 83735 ASSAY OF MAGNESIUM: CPT

## 2023-01-01 PROCEDURE — 83935 ASSAY OF URINE OSMOLALITY: CPT

## 2023-01-01 PROCEDURE — 71045 X-RAY EXAM CHEST 1 VIEW: CPT | Mod: 26

## 2023-01-01 PROCEDURE — 85610 PROTHROMBIN TIME: CPT

## 2023-01-01 PROCEDURE — 81003 URINALYSIS AUTO W/O SCOPE: CPT

## 2023-01-01 PROCEDURE — 88305 TISSUE EXAM BY PATHOLOGIST: CPT | Mod: 26

## 2023-01-01 PROCEDURE — 99231 SBSQ HOSP IP/OBS SF/LOW 25: CPT

## 2023-01-01 PROCEDURE — 94760 N-INVAS EAR/PLS OXIMETRY 1: CPT

## 2023-01-01 PROCEDURE — 36415 COLL VENOUS BLD VENIPUNCTURE: CPT

## 2023-01-01 PROCEDURE — 99285 EMERGENCY DEPT VISIT HI MDM: CPT | Mod: 25

## 2023-01-01 PROCEDURE — 99223 1ST HOSP IP/OBS HIGH 75: CPT

## 2023-01-01 PROCEDURE — 85025 COMPLETE CBC W/AUTO DIFF WBC: CPT

## 2023-01-01 PROCEDURE — 93306 TTE W/DOPPLER COMPLETE: CPT | Mod: 26

## 2023-01-01 PROCEDURE — 84540 ASSAY OF URINE/UREA-N: CPT

## 2023-01-01 PROCEDURE — 93010 ELECTROCARDIOGRAM REPORT: CPT

## 2023-01-01 PROCEDURE — 80162 ASSAY OF DIGOXIN TOTAL: CPT

## 2023-01-01 PROCEDURE — 83615 LACTATE (LD) (LDH) ENZYME: CPT

## 2023-01-01 PROCEDURE — 82140 ASSAY OF AMMONIA: CPT

## 2023-01-01 PROCEDURE — 99285 EMERGENCY DEPT VISIT HI MDM: CPT

## 2023-01-01 PROCEDURE — 85730 THROMBOPLASTIN TIME PARTIAL: CPT

## 2023-01-01 PROCEDURE — 86803 HEPATITIS C AB TEST: CPT

## 2023-01-01 PROCEDURE — 99239 HOSP IP/OBS DSCHRG MGMT >30: CPT

## 2023-01-01 PROCEDURE — 84132 ASSAY OF SERUM POTASSIUM: CPT

## 2023-01-01 PROCEDURE — 82962 GLUCOSE BLOOD TEST: CPT

## 2023-01-01 PROCEDURE — 80053 COMPREHEN METABOLIC PANEL: CPT

## 2023-01-01 PROCEDURE — C1729: CPT

## 2023-01-01 PROCEDURE — 84133 ASSAY OF URINE POTASSIUM: CPT

## 2023-01-01 PROCEDURE — 82803 BLOOD GASES ANY COMBINATION: CPT

## 2023-01-01 PROCEDURE — 84145 PROCALCITONIN (PCT): CPT

## 2023-01-01 PROCEDURE — 93010 ELECTROCARDIOGRAM REPORT: CPT | Mod: 77

## 2023-01-01 PROCEDURE — 87070 CULTURE OTHR SPECIMN AEROBIC: CPT

## 2023-01-01 PROCEDURE — 81001 URINALYSIS AUTO W/SCOPE: CPT

## 2023-01-01 PROCEDURE — 89051 BODY FLUID CELL COUNT: CPT

## 2023-01-01 PROCEDURE — 88108 CYTOPATH CONCENTRATE TECH: CPT

## 2023-01-01 PROCEDURE — 84156 ASSAY OF PROTEIN URINE: CPT

## 2023-01-01 PROCEDURE — 99223 1ST HOSP IP/OBS HIGH 75: CPT | Mod: GC

## 2023-01-01 PROCEDURE — 88305 TISSUE EXAM BY PATHOLOGIST: CPT

## 2023-01-01 PROCEDURE — 93306 TTE W/DOPPLER COMPLETE: CPT

## 2023-01-01 PROCEDURE — P9047: CPT

## 2023-01-01 PROCEDURE — 74177 CT ABD & PELVIS W/CONTRAST: CPT | Mod: MA

## 2023-01-01 PROCEDURE — 83036 HEMOGLOBIN GLYCOSYLATED A1C: CPT

## 2023-01-01 PROCEDURE — 83605 ASSAY OF LACTIC ACID: CPT

## 2023-01-01 PROCEDURE — 84157 ASSAY OF PROTEIN OTHER: CPT

## 2023-01-01 PROCEDURE — 99292 CRITICAL CARE ADDL 30 MIN: CPT

## 2023-01-01 PROCEDURE — 36600 WITHDRAWAL OF ARTERIAL BLOOD: CPT

## 2023-01-01 PROCEDURE — 87640 STAPH A DNA AMP PROBE: CPT

## 2023-01-01 PROCEDURE — 82105 ALPHA-FETOPROTEIN SERUM: CPT

## 2023-01-01 PROCEDURE — 87641 MR-STAPH DNA AMP PROBE: CPT

## 2023-01-01 PROCEDURE — 87637 SARSCOV2&INF A&B&RSV AMP PRB: CPT

## 2023-01-01 PROCEDURE — 94002 VENT MGMT INPAT INIT DAY: CPT

## 2023-01-01 PROCEDURE — 96361 HYDRATE IV INFUSION ADD-ON: CPT

## 2023-01-01 PROCEDURE — 99291 CRITICAL CARE FIRST HOUR: CPT

## 2023-01-01 PROCEDURE — 88108 CYTOPATH CONCENTRATE TECH: CPT | Mod: 26

## 2023-01-01 PROCEDURE — 93005 ELECTROCARDIOGRAM TRACING: CPT

## 2023-01-01 PROCEDURE — 87075 CULTR BACTERIA EXCEPT BLOOD: CPT

## 2023-01-01 PROCEDURE — 96365 THER/PROPH/DIAG IV INF INIT: CPT

## 2023-01-01 PROCEDURE — 87205 SMEAR GRAM STAIN: CPT

## 2023-01-01 RX ORDER — SODIUM CHLORIDE 9 MG/ML
1000 INJECTION INTRAMUSCULAR; INTRAVENOUS; SUBCUTANEOUS
Refills: 0 | Status: DISCONTINUED | OUTPATIENT
Start: 2023-01-01 | End: 2023-01-01

## 2023-01-01 RX ORDER — HEPARIN SODIUM 5000 [USP'U]/ML
5000 INJECTION INTRAVENOUS; SUBCUTANEOUS EVERY 12 HOURS
Refills: 0 | Status: DISCONTINUED | OUTPATIENT
Start: 2023-01-01 | End: 2023-01-01

## 2023-01-01 RX ORDER — INSULIN HUMAN 100 [IU]/ML
10 INJECTION, SOLUTION SUBCUTANEOUS ONCE
Refills: 0 | Status: COMPLETED | OUTPATIENT
Start: 2023-01-01 | End: 2023-01-01

## 2023-01-01 RX ORDER — DEXTROSE 50 % IN WATER 50 %
50 SYRINGE (ML) INTRAVENOUS ONCE
Refills: 0 | Status: COMPLETED | OUTPATIENT
Start: 2023-01-01 | End: 2023-01-01

## 2023-01-01 RX ORDER — SIMETHICONE 80 MG/1
80 TABLET, CHEWABLE ORAL DAILY
Refills: 0 | Status: DISCONTINUED | OUTPATIENT
Start: 2023-01-01 | End: 2023-01-01

## 2023-01-01 RX ORDER — CARVEDILOL PHOSPHATE 80 MG/1
3.12 CAPSULE, EXTENDED RELEASE ORAL EVERY 12 HOURS
Refills: 0 | Status: DISCONTINUED | OUTPATIENT
Start: 2023-01-01 | End: 2023-01-01

## 2023-01-01 RX ORDER — SODIUM BICARBONATE 1 MEQ/ML
0.4 SYRINGE (ML) INTRAVENOUS
Qty: 150 | Refills: 0 | Status: DISCONTINUED | OUTPATIENT
Start: 2023-01-01 | End: 2023-01-01

## 2023-01-01 RX ORDER — SENNA PLUS 8.6 MG/1
1 TABLET ORAL AT BEDTIME
Refills: 0 | Status: DISCONTINUED | OUTPATIENT
Start: 2023-01-01 | End: 2023-01-01

## 2023-01-01 RX ORDER — ATORVASTATIN CALCIUM 80 MG/1
1 TABLET, FILM COATED ORAL
Refills: 0 | DISCHARGE

## 2023-01-01 RX ORDER — MIDODRINE HYDROCHLORIDE 2.5 MG/1
10 TABLET ORAL EVERY 8 HOURS
Refills: 0 | Status: DISCONTINUED | OUTPATIENT
Start: 2023-01-01 | End: 2023-01-01

## 2023-01-01 RX ORDER — INSULIN LISPRO 100/ML
VIAL (ML) SUBCUTANEOUS
Refills: 0 | Status: DISCONTINUED | OUTPATIENT
Start: 2023-01-01 | End: 2023-01-01

## 2023-01-01 RX ORDER — ALBUTEROL 90 UG/1
2 AEROSOL, METERED ORAL EVERY 6 HOURS
Refills: 0 | Status: DISCONTINUED | OUTPATIENT
Start: 2023-01-01 | End: 2023-01-01

## 2023-01-01 RX ORDER — INSULIN LISPRO 100/ML
6 VIAL (ML) SUBCUTANEOUS
Refills: 0 | Status: DISCONTINUED | OUTPATIENT
Start: 2023-01-01 | End: 2023-01-01

## 2023-01-01 RX ORDER — INSULIN LISPRO 100/ML
VIAL (ML) SUBCUTANEOUS EVERY 6 HOURS
Refills: 0 | Status: DISCONTINUED | OUTPATIENT
Start: 2023-01-01 | End: 2023-01-01

## 2023-01-01 RX ORDER — SODIUM CHLORIDE 9 MG/ML
2 INJECTION INTRAMUSCULAR; INTRAVENOUS; SUBCUTANEOUS EVERY 6 HOURS
Refills: 0 | Status: COMPLETED | OUTPATIENT
Start: 2023-01-01 | End: 2023-01-01

## 2023-01-01 RX ORDER — OCTREOTIDE ACETATE 200 UG/ML
50 INJECTION, SOLUTION INTRAVENOUS; SUBCUTANEOUS
Qty: 500 | Refills: 0 | Status: DISCONTINUED | OUTPATIENT
Start: 2023-01-01 | End: 2023-01-01

## 2023-01-01 RX ORDER — INSULIN LISPRO 100/ML
VIAL (ML) SUBCUTANEOUS EVERY 4 HOURS
Refills: 0 | Status: DISCONTINUED | OUTPATIENT
Start: 2023-01-01 | End: 2023-01-01

## 2023-01-01 RX ORDER — SODIUM POLYSTYRENE SULFONATE 4.1 MEQ/G
30 POWDER, FOR SUSPENSION ORAL ONCE
Refills: 0 | Status: COMPLETED | OUTPATIENT
Start: 2023-01-01 | End: 2023-01-01

## 2023-01-01 RX ORDER — INSULIN LISPRO 100/ML
3 VIAL (ML) SUBCUTANEOUS
Refills: 0 | Status: DISCONTINUED | OUTPATIENT
Start: 2023-01-01 | End: 2023-01-01

## 2023-01-01 RX ORDER — SPIRONOLACTONE 25 MG/1
1 TABLET, FILM COATED ORAL
Refills: 0 | DISCHARGE

## 2023-01-01 RX ORDER — PANTOPRAZOLE SODIUM 20 MG/1
8 TABLET, DELAYED RELEASE ORAL
Qty: 80 | Refills: 0 | Status: DISCONTINUED | OUTPATIENT
Start: 2023-01-01 | End: 2023-01-01

## 2023-01-01 RX ORDER — CEFTRIAXONE 500 MG/1
2000 INJECTION, POWDER, FOR SOLUTION INTRAMUSCULAR; INTRAVENOUS EVERY 24 HOURS
Refills: 0 | Status: DISCONTINUED | OUTPATIENT
Start: 2023-01-01 | End: 2023-01-01

## 2023-01-01 RX ORDER — DIGOXIN 250 MCG
125 TABLET ORAL
Refills: 0 | Status: DISCONTINUED | OUTPATIENT
Start: 2023-01-01 | End: 2023-01-01

## 2023-01-01 RX ORDER — CALCIUM GLUCONATE 100 MG/ML
2 VIAL (ML) INTRAVENOUS ONCE
Refills: 0 | Status: COMPLETED | OUTPATIENT
Start: 2023-01-01 | End: 2023-01-01

## 2023-01-01 RX ORDER — LACTULOSE 10 G/15ML
20 SOLUTION ORAL THREE TIMES A DAY
Refills: 0 | Status: DISCONTINUED | OUTPATIENT
Start: 2023-01-01 | End: 2023-01-01

## 2023-01-01 RX ORDER — IPRATROPIUM/ALBUTEROL SULFATE 18-103MCG
3 AEROSOL WITH ADAPTER (GRAM) INHALATION EVERY 6 HOURS
Refills: 0 | Status: DISCONTINUED | OUTPATIENT
Start: 2023-01-01 | End: 2023-01-01

## 2023-01-01 RX ORDER — INSULIN HUMAN 100 [IU]/ML
10 INJECTION, SOLUTION SUBCUTANEOUS ONCE
Refills: 0 | Status: DISCONTINUED | OUTPATIENT
Start: 2023-01-01 | End: 2023-01-01

## 2023-01-01 RX ORDER — INSULIN GLARGINE 100 [IU]/ML
20 INJECTION, SOLUTION SUBCUTANEOUS
Qty: 5 | Refills: 0
Start: 2023-01-01 | End: 2023-09-23

## 2023-01-01 RX ORDER — CHLORHEXIDINE GLUCONATE 213 G/1000ML
1 SOLUTION TOPICAL DAILY
Refills: 0 | Status: DISCONTINUED | OUTPATIENT
Start: 2023-01-01 | End: 2023-01-01

## 2023-01-01 RX ORDER — NOREPINEPHRINE BITARTRATE/D5W 8 MG/250ML
0.05 PLASTIC BAG, INJECTION (ML) INTRAVENOUS
Qty: 16 | Refills: 0 | Status: DISCONTINUED | OUTPATIENT
Start: 2023-01-01 | End: 2023-01-01

## 2023-01-01 RX ORDER — FUROSEMIDE 40 MG
20 TABLET ORAL ONCE
Refills: 0 | Status: COMPLETED | OUTPATIENT
Start: 2023-01-01 | End: 2023-01-01

## 2023-01-01 RX ORDER — SODIUM POLYSTYRENE SULFONATE 4.1 MEQ/G
15 POWDER, FOR SUSPENSION ORAL ONCE
Refills: 0 | Status: DISCONTINUED | OUTPATIENT
Start: 2023-01-01 | End: 2023-01-01

## 2023-01-01 RX ORDER — DEXTROSE 50 % IN WATER 50 %
25 SYRINGE (ML) INTRAVENOUS ONCE
Refills: 0 | Status: DISCONTINUED | OUTPATIENT
Start: 2023-01-01 | End: 2023-01-01

## 2023-01-01 RX ORDER — DEXTROSE 50 % IN WATER 50 %
15 SYRINGE (ML) INTRAVENOUS ONCE
Refills: 0 | Status: DISCONTINUED | OUTPATIENT
Start: 2023-01-01 | End: 2023-01-01

## 2023-01-01 RX ORDER — INSULIN GLARGINE 100 [IU]/ML
10 INJECTION, SOLUTION SUBCUTANEOUS EVERY MORNING
Refills: 0 | Status: DISCONTINUED | OUTPATIENT
Start: 2023-01-01 | End: 2023-01-01

## 2023-01-01 RX ORDER — SPIRONOLACTONE 25 MG/1
25 TABLET, FILM COATED ORAL DAILY
Refills: 0 | Status: DISCONTINUED | OUTPATIENT
Start: 2023-01-01 | End: 2023-01-01

## 2023-01-01 RX ORDER — LANOLIN ALCOHOL/MO/W.PET/CERES
3 CREAM (GRAM) TOPICAL AT BEDTIME
Refills: 0 | Status: DISCONTINUED | OUTPATIENT
Start: 2023-01-01 | End: 2023-01-01

## 2023-01-01 RX ORDER — SODIUM CHLORIDE 9 MG/ML
1000 INJECTION INTRAMUSCULAR; INTRAVENOUS; SUBCUTANEOUS ONCE
Refills: 0 | Status: COMPLETED | OUTPATIENT
Start: 2023-01-01 | End: 2023-01-01

## 2023-01-01 RX ORDER — NOREPINEPHRINE BITARTRATE/D5W 8 MG/250ML
0.05 PLASTIC BAG, INJECTION (ML) INTRAVENOUS
Qty: 8 | Refills: 0 | Status: DISCONTINUED | OUTPATIENT
Start: 2023-01-01 | End: 2023-01-01

## 2023-01-01 RX ORDER — ALBUMIN HUMAN 25 %
50 VIAL (ML) INTRAVENOUS ONCE
Refills: 0 | Status: COMPLETED | OUTPATIENT
Start: 2023-01-01 | End: 2023-01-01

## 2023-01-01 RX ORDER — ATORVASTATIN CALCIUM 80 MG/1
10 TABLET, FILM COATED ORAL AT BEDTIME
Refills: 0 | Status: DISCONTINUED | OUTPATIENT
Start: 2023-01-01 | End: 2023-01-01

## 2023-01-01 RX ORDER — SODIUM CHLORIDE 9 MG/ML
1000 INJECTION, SOLUTION INTRAVENOUS
Refills: 0 | Status: DISCONTINUED | OUTPATIENT
Start: 2023-01-01 | End: 2023-01-01

## 2023-01-01 RX ORDER — CHLORHEXIDINE GLUCONATE 213 G/1000ML
1 SOLUTION TOPICAL
Refills: 0 | Status: DISCONTINUED | OUTPATIENT
Start: 2023-01-01 | End: 2023-01-01

## 2023-01-01 RX ORDER — HEPARIN SODIUM 5000 [USP'U]/ML
5000 INJECTION INTRAVENOUS; SUBCUTANEOUS EVERY 8 HOURS
Refills: 0 | Status: COMPLETED | OUTPATIENT
Start: 2023-01-01 | End: 2023-01-01

## 2023-01-01 RX ORDER — CEFTRIAXONE 500 MG/1
1000 INJECTION, POWDER, FOR SOLUTION INTRAMUSCULAR; INTRAVENOUS ONCE
Refills: 0 | Status: COMPLETED | OUTPATIENT
Start: 2023-01-01 | End: 2023-01-01

## 2023-01-01 RX ORDER — PANTOPRAZOLE SODIUM 20 MG/1
40 TABLET, DELAYED RELEASE ORAL ONCE
Refills: 0 | Status: COMPLETED | OUTPATIENT
Start: 2023-01-01 | End: 2023-01-01

## 2023-01-01 RX ORDER — GLUCAGON INJECTION, SOLUTION 0.5 MG/.1ML
1 INJECTION, SOLUTION SUBCUTANEOUS ONCE
Refills: 0 | Status: DISCONTINUED | OUTPATIENT
Start: 2023-01-01 | End: 2023-01-01

## 2023-01-01 RX ORDER — INSULIN GLARGINE 100 [IU]/ML
15 INJECTION, SOLUTION SUBCUTANEOUS EVERY MORNING
Refills: 0 | Status: DISCONTINUED | OUTPATIENT
Start: 2023-01-01 | End: 2023-01-01

## 2023-01-01 RX ORDER — INSULIN HUMAN 100 [IU]/ML
5 INJECTION, SOLUTION SUBCUTANEOUS ONCE
Refills: 0 | Status: COMPLETED | OUTPATIENT
Start: 2023-01-01 | End: 2023-01-01

## 2023-01-01 RX ORDER — DEXTROSE 50 % IN WATER 50 %
12.5 SYRINGE (ML) INTRAVENOUS ONCE
Refills: 0 | Status: DISCONTINUED | OUTPATIENT
Start: 2023-01-01 | End: 2023-01-01

## 2023-01-01 RX ORDER — DIGOXIN 250 MCG
125 TABLET ORAL ONCE
Refills: 0 | Status: COMPLETED | OUTPATIENT
Start: 2023-01-01 | End: 2023-01-01

## 2023-01-01 RX ORDER — SODIUM BICARBONATE 1 MEQ/ML
50 SYRINGE (ML) INTRAVENOUS ONCE
Refills: 0 | Status: COMPLETED | OUTPATIENT
Start: 2023-01-01 | End: 2023-01-01

## 2023-01-01 RX ORDER — GLIMEPIRIDE 1 MG
1 TABLET ORAL
Refills: 0 | DISCHARGE

## 2023-01-01 RX ORDER — MIDODRINE HYDROCHLORIDE 2.5 MG/1
2.5 TABLET ORAL
Refills: 0 | Status: DISCONTINUED | OUTPATIENT
Start: 2023-01-01 | End: 2023-01-01

## 2023-01-01 RX ORDER — DIGOXIN 250 MCG
1 TABLET ORAL
Refills: 0 | DISCHARGE

## 2023-01-01 RX ORDER — SENNA PLUS 8.6 MG/1
2 TABLET ORAL AT BEDTIME
Refills: 0 | Status: DISCONTINUED | OUTPATIENT
Start: 2023-01-01 | End: 2023-01-01

## 2023-01-01 RX ORDER — INSULIN HUMAN 100 [IU]/ML
5 INJECTION, SOLUTION SUBCUTANEOUS ONCE
Refills: 0 | Status: DISCONTINUED | OUTPATIENT
Start: 2023-01-01 | End: 2023-01-01

## 2023-01-01 RX ORDER — ISOPROPYL ALCOHOL, BENZOCAINE .7; .06 ML/ML; ML/ML
0 SWAB TOPICAL
Qty: 100 | Refills: 1
Start: 2023-01-01

## 2023-01-01 RX ORDER — ALBUMIN HUMAN 25 %
50 VIAL (ML) INTRAVENOUS EVERY 6 HOURS
Refills: 0 | Status: DISCONTINUED | OUTPATIENT
Start: 2023-01-01 | End: 2023-01-01

## 2023-01-01 RX ORDER — SODIUM ZIRCONIUM CYCLOSILICATE 10 G/10G
5 POWDER, FOR SUSPENSION ORAL ONCE
Refills: 0 | Status: COMPLETED | OUTPATIENT
Start: 2023-01-01 | End: 2023-01-01

## 2023-01-01 RX ORDER — CHLORHEXIDINE GLUCONATE 213 G/1000ML
15 SOLUTION TOPICAL EVERY 12 HOURS
Refills: 0 | Status: DISCONTINUED | OUTPATIENT
Start: 2023-01-01 | End: 2023-01-01

## 2023-01-01 RX ORDER — INSULIN GLARGINE 100 [IU]/ML
5 INJECTION, SOLUTION SUBCUTANEOUS EVERY MORNING
Refills: 0 | Status: DISCONTINUED | OUTPATIENT
Start: 2023-01-01 | End: 2023-01-01

## 2023-01-01 RX ORDER — MIDODRINE HYDROCHLORIDE 2.5 MG/1
1 TABLET ORAL
Qty: 0 | Refills: 0 | DISCHARGE
Start: 2023-01-01

## 2023-01-01 RX ORDER — METFORMIN HYDROCHLORIDE 850 MG/1
1 TABLET ORAL
Refills: 0 | DISCHARGE

## 2023-01-01 RX ORDER — SODIUM CHLORIDE 9 MG/ML
10 INJECTION INTRAMUSCULAR; INTRAVENOUS; SUBCUTANEOUS
Refills: 0 | Status: DISCONTINUED | OUTPATIENT
Start: 2023-01-01 | End: 2023-01-01

## 2023-01-01 RX ORDER — INSULIN GLARGINE 100 [IU]/ML
15 INJECTION, SOLUTION SUBCUTANEOUS
Qty: 2 | Refills: 0
Start: 2023-01-01 | End: 2023-09-23

## 2023-01-01 RX ORDER — VASOPRESSIN 20 [USP'U]/ML
0.04 INJECTION INTRAVENOUS
Qty: 40 | Refills: 0 | Status: DISCONTINUED | OUTPATIENT
Start: 2023-01-01 | End: 2023-01-01

## 2023-01-01 RX ORDER — INSULIN LISPRO 100/ML
VIAL (ML) SUBCUTANEOUS AT BEDTIME
Refills: 0 | Status: DISCONTINUED | OUTPATIENT
Start: 2023-01-01 | End: 2023-01-01

## 2023-01-01 RX ORDER — RAMIPRIL 5 MG
1 CAPSULE ORAL
Refills: 0 | DISCHARGE

## 2023-01-01 RX ORDER — FUROSEMIDE 40 MG
20 TABLET ORAL ONCE
Refills: 0 | Status: DISCONTINUED | OUTPATIENT
Start: 2023-01-01 | End: 2023-01-01

## 2023-01-01 RX ORDER — HEPARIN SODIUM 5000 [USP'U]/ML
5000 INJECTION INTRAVENOUS; SUBCUTANEOUS EVERY 8 HOURS
Refills: 0 | Status: DISCONTINUED | OUTPATIENT
Start: 2023-01-01 | End: 2023-01-01

## 2023-01-01 RX ORDER — PIPERACILLIN AND TAZOBACTAM 4; .5 G/20ML; G/20ML
3.38 INJECTION, POWDER, LYOPHILIZED, FOR SOLUTION INTRAVENOUS EVERY 8 HOURS
Refills: 0 | Status: DISCONTINUED | OUTPATIENT
Start: 2023-01-01 | End: 2023-01-01

## 2023-01-01 RX ORDER — SODIUM CHLORIDE 9 MG/ML
500 INJECTION INTRAMUSCULAR; INTRAVENOUS; SUBCUTANEOUS ONCE
Refills: 0 | Status: COMPLETED | OUTPATIENT
Start: 2023-01-01 | End: 2023-01-01

## 2023-01-01 RX ORDER — METOPROLOL TARTRATE 50 MG
25 TABLET ORAL
Refills: 0 | Status: DISCONTINUED | OUTPATIENT
Start: 2023-01-01 | End: 2023-01-01

## 2023-01-01 RX ORDER — CARVEDILOL PHOSPHATE 80 MG/1
1 CAPSULE, EXTENDED RELEASE ORAL
Refills: 0 | DISCHARGE

## 2023-01-01 RX ORDER — PIPERACILLIN AND TAZOBACTAM 4; .5 G/20ML; G/20ML
3.38 INJECTION, POWDER, LYOPHILIZED, FOR SOLUTION INTRAVENOUS ONCE
Refills: 0 | Status: COMPLETED | OUTPATIENT
Start: 2023-01-01 | End: 2023-01-01

## 2023-01-01 RX ORDER — PIPERACILLIN AND TAZOBACTAM 4; .5 G/20ML; G/20ML
3.38 INJECTION, POWDER, LYOPHILIZED, FOR SOLUTION INTRAVENOUS ONCE
Refills: 0 | Status: DISCONTINUED | OUTPATIENT
Start: 2023-01-01 | End: 2023-01-01

## 2023-01-01 RX ORDER — ALBUMIN HUMAN 25 %
50 VIAL (ML) INTRAVENOUS ONCE
Refills: 0 | Status: DISCONTINUED | OUTPATIENT
Start: 2023-01-01 | End: 2023-01-01

## 2023-01-01 RX ORDER — SODIUM ZIRCONIUM CYCLOSILICATE 10 G/10G
10 POWDER, FOR SUSPENSION ORAL ONCE
Refills: 0 | Status: COMPLETED | OUTPATIENT
Start: 2023-01-01 | End: 2023-01-01

## 2023-01-01 RX ADMIN — CEFTRIAXONE 100 MILLIGRAM(S): 500 INJECTION, POWDER, FOR SOLUTION INTRAMUSCULAR; INTRAVENOUS at 17:30

## 2023-01-01 RX ADMIN — INSULIN GLARGINE 5 UNIT(S): 100 INJECTION, SOLUTION SUBCUTANEOUS at 13:02

## 2023-01-01 RX ADMIN — Medication 3 UNIT(S): at 09:41

## 2023-01-01 RX ADMIN — SODIUM CHLORIDE 1000 MILLILITER(S): 9 INJECTION INTRAMUSCULAR; INTRAVENOUS; SUBCUTANEOUS at 18:20

## 2023-01-01 RX ADMIN — HEPARIN SODIUM 5000 UNIT(S): 5000 INJECTION INTRAVENOUS; SUBCUTANEOUS at 13:01

## 2023-01-01 RX ADMIN — INSULIN HUMAN 10 UNIT(S): 100 INJECTION, SOLUTION SUBCUTANEOUS at 01:27

## 2023-01-01 RX ADMIN — SODIUM POLYSTYRENE SULFONATE 30 GRAM(S): 4.1 POWDER, FOR SUSPENSION ORAL at 20:23

## 2023-01-01 RX ADMIN — INSULIN GLARGINE 10 UNIT(S): 100 INJECTION, SOLUTION SUBCUTANEOUS at 08:31

## 2023-01-01 RX ADMIN — Medication 100 MEQ/KG/HR: at 01:29

## 2023-01-01 RX ADMIN — INSULIN GLARGINE 15 UNIT(S): 100 INJECTION, SOLUTION SUBCUTANEOUS at 09:19

## 2023-01-01 RX ADMIN — Medication 6 UNIT(S): at 12:42

## 2023-01-01 RX ADMIN — HEPARIN SODIUM 5000 UNIT(S): 5000 INJECTION INTRAVENOUS; SUBCUTANEOUS at 18:12

## 2023-01-01 RX ADMIN — VASOPRESSIN 6 UNIT(S)/MIN: 20 INJECTION INTRAVENOUS at 01:30

## 2023-01-01 RX ADMIN — Medication 3 UNIT(S): at 11:26

## 2023-01-01 RX ADMIN — Medication 50 MILLILITER(S): at 01:20

## 2023-01-01 RX ADMIN — CEFTRIAXONE 100 MILLIGRAM(S): 500 INJECTION, POWDER, FOR SOLUTION INTRAMUSCULAR; INTRAVENOUS at 18:09

## 2023-01-01 RX ADMIN — Medication 20 MILLIGRAM(S): at 09:04

## 2023-01-01 RX ADMIN — Medication 3 UNIT(S): at 17:42

## 2023-01-01 RX ADMIN — MIDODRINE HYDROCHLORIDE 2.5 MILLIGRAM(S): 2.5 TABLET ORAL at 21:39

## 2023-01-01 RX ADMIN — Medication 4: at 17:23

## 2023-01-01 RX ADMIN — Medication 100 MILLIGRAM(S): at 13:00

## 2023-01-01 RX ADMIN — HEPARIN SODIUM 5000 UNIT(S): 5000 INJECTION INTRAVENOUS; SUBCUTANEOUS at 21:26

## 2023-01-01 RX ADMIN — Medication 2: at 17:22

## 2023-01-01 RX ADMIN — Medication 2: at 09:42

## 2023-01-01 RX ADMIN — Medication 50 MILLIEQUIVALENT(S): at 02:18

## 2023-01-01 RX ADMIN — SODIUM CHLORIDE 2 GRAM(S): 9 INJECTION INTRAMUSCULAR; INTRAVENOUS; SUBCUTANEOUS at 17:58

## 2023-01-01 RX ADMIN — Medication 3 UNIT(S): at 17:24

## 2023-01-01 RX ADMIN — INSULIN HUMAN 5 UNIT(S): 100 INJECTION, SOLUTION SUBCUTANEOUS at 18:25

## 2023-01-01 RX ADMIN — Medication 1: at 22:18

## 2023-01-01 RX ADMIN — INSULIN GLARGINE 10 UNIT(S): 100 INJECTION, SOLUTION SUBCUTANEOUS at 08:32

## 2023-01-01 RX ADMIN — HEPARIN SODIUM 5000 UNIT(S): 5000 INJECTION INTRAVENOUS; SUBCUTANEOUS at 05:09

## 2023-01-01 RX ADMIN — CEFTRIAXONE 100 MILLIGRAM(S): 500 INJECTION, POWDER, FOR SOLUTION INTRAMUSCULAR; INTRAVENOUS at 17:34

## 2023-01-01 RX ADMIN — Medication 125 MICROGRAM(S): at 10:25

## 2023-01-01 RX ADMIN — CARVEDILOL PHOSPHATE 3.12 MILLIGRAM(S): 80 CAPSULE, EXTENDED RELEASE ORAL at 18:05

## 2023-01-01 RX ADMIN — VASOPRESSIN 6 UNIT(S)/MIN: 20 INJECTION INTRAVENOUS at 13:57

## 2023-01-01 RX ADMIN — PIPERACILLIN AND TAZOBACTAM 200 GRAM(S): 4; .5 INJECTION, POWDER, LYOPHILIZED, FOR SOLUTION INTRAVENOUS at 13:57

## 2023-01-01 RX ADMIN — Medication 125 MICROGRAM(S): at 15:50

## 2023-01-01 RX ADMIN — Medication 50 MILLIEQUIVALENT(S): at 02:17

## 2023-01-01 RX ADMIN — Medication 2: at 08:32

## 2023-01-01 RX ADMIN — INSULIN HUMAN 5 UNIT(S): 100 INJECTION, SOLUTION SUBCUTANEOUS at 18:16

## 2023-01-01 RX ADMIN — CARVEDILOL PHOSPHATE 3.12 MILLIGRAM(S): 80 CAPSULE, EXTENDED RELEASE ORAL at 05:03

## 2023-01-01 RX ADMIN — INSULIN GLARGINE 5 UNIT(S): 100 INJECTION, SOLUTION SUBCUTANEOUS at 08:31

## 2023-01-01 RX ADMIN — SODIUM CHLORIDE 2 GRAM(S): 9 INJECTION INTRAMUSCULAR; INTRAVENOUS; SUBCUTANEOUS at 14:17

## 2023-01-01 RX ADMIN — SPIRONOLACTONE 25 MILLIGRAM(S): 25 TABLET, FILM COATED ORAL at 05:03

## 2023-01-01 RX ADMIN — Medication 100 MILLIGRAM(S): at 11:24

## 2023-01-01 RX ADMIN — Medication 50 MILLILITER(S): at 18:13

## 2023-01-01 RX ADMIN — Medication 4: at 12:41

## 2023-01-01 RX ADMIN — CEFTRIAXONE 1000 MILLIGRAM(S): 500 INJECTION, POWDER, FOR SOLUTION INTRAMUSCULAR; INTRAVENOUS at 18:46

## 2023-01-01 RX ADMIN — SODIUM CHLORIDE 2 GRAM(S): 9 INJECTION INTRAMUSCULAR; INTRAVENOUS; SUBCUTANEOUS at 21:39

## 2023-01-01 RX ADMIN — CARVEDILOL PHOSPHATE 3.12 MILLIGRAM(S): 80 CAPSULE, EXTENDED RELEASE ORAL at 05:28

## 2023-01-01 RX ADMIN — SODIUM POLYSTYRENE SULFONATE 30 GRAM(S): 4.1 POWDER, FOR SUSPENSION ORAL at 01:17

## 2023-01-01 RX ADMIN — SODIUM CHLORIDE 100 MILLILITER(S): 9 INJECTION, SOLUTION INTRAVENOUS at 12:00

## 2023-01-01 RX ADMIN — Medication 20 MILLIGRAM(S): at 09:58

## 2023-01-01 RX ADMIN — CEFTRIAXONE 100 MILLIGRAM(S): 500 INJECTION, POWDER, FOR SOLUTION INTRAMUSCULAR; INTRAVENOUS at 17:42

## 2023-01-01 RX ADMIN — Medication 2.64 MICROGRAM(S)/KG/MIN: at 01:57

## 2023-01-01 RX ADMIN — Medication 6 UNIT(S): at 09:19

## 2023-01-01 RX ADMIN — PANTOPRAZOLE SODIUM 10 MG/HR: 20 TABLET, DELAYED RELEASE ORAL at 18:00

## 2023-01-01 RX ADMIN — CARVEDILOL PHOSPHATE 3.12 MILLIGRAM(S): 80 CAPSULE, EXTENDED RELEASE ORAL at 17:25

## 2023-01-01 RX ADMIN — SIMETHICONE 80 MILLIGRAM(S): 80 TABLET, CHEWABLE ORAL at 17:58

## 2023-01-01 RX ADMIN — SODIUM CHLORIDE 1000 MILLILITER(S): 9 INJECTION INTRAMUSCULAR; INTRAVENOUS; SUBCUTANEOUS at 16:58

## 2023-01-01 RX ADMIN — Medication 6 UNIT(S): at 08:17

## 2023-01-01 RX ADMIN — SPIRONOLACTONE 25 MILLIGRAM(S): 25 TABLET, FILM COATED ORAL at 09:04

## 2023-01-01 RX ADMIN — Medication 100 MEQ/KG/HR: at 02:18

## 2023-01-01 RX ADMIN — Medication 50 MILLILITER(S): at 01:08

## 2023-01-01 RX ADMIN — Medication 6 UNIT(S): at 11:46

## 2023-01-01 RX ADMIN — Medication 6 UNIT(S): at 08:32

## 2023-01-01 RX ADMIN — SENNA PLUS 2 TABLET(S): 8.6 TABLET ORAL at 21:39

## 2023-01-01 RX ADMIN — Medication 6 UNIT(S): at 17:23

## 2023-01-01 RX ADMIN — SODIUM CHLORIDE 1000 MILLILITER(S): 9 INJECTION INTRAMUSCULAR; INTRAVENOUS; SUBCUTANEOUS at 18:18

## 2023-01-01 RX ADMIN — Medication 25 MILLIGRAM(S): at 17:58

## 2023-01-01 RX ADMIN — Medication 50 MILLILITER(S): at 11:52

## 2023-01-01 RX ADMIN — HEPARIN SODIUM 5000 UNIT(S): 5000 INJECTION INTRAVENOUS; SUBCUTANEOUS at 18:06

## 2023-01-01 RX ADMIN — Medication 3 UNIT(S): at 13:02

## 2023-01-01 RX ADMIN — Medication 2.64 MICROGRAM(S)/KG/MIN: at 20:23

## 2023-01-01 RX ADMIN — Medication 50 MILLIEQUIVALENT(S): at 11:49

## 2023-01-01 RX ADMIN — Medication 4: at 17:57

## 2023-01-01 RX ADMIN — HEPARIN SODIUM 5000 UNIT(S): 5000 INJECTION INTRAVENOUS; SUBCUTANEOUS at 19:24

## 2023-01-01 RX ADMIN — Medication 50 MILLILITER(S): at 23:47

## 2023-01-01 RX ADMIN — Medication 5.62 MICROGRAM(S)/KG/MIN: at 17:00

## 2023-01-01 RX ADMIN — CHLORHEXIDINE GLUCONATE 1 APPLICATION(S): 213 SOLUTION TOPICAL at 20:00

## 2023-01-01 RX ADMIN — Medication 8: at 07:50

## 2023-01-01 RX ADMIN — CEFTRIAXONE 100 MILLIGRAM(S): 500 INJECTION, POWDER, FOR SOLUTION INTRAMUSCULAR; INTRAVENOUS at 18:16

## 2023-01-01 RX ADMIN — Medication 2: at 11:46

## 2023-01-01 RX ADMIN — Medication 5.62 MICROGRAM(S)/KG/MIN: at 12:44

## 2023-01-01 RX ADMIN — PANTOPRAZOLE SODIUM 40 MILLIGRAM(S): 20 TABLET, DELAYED RELEASE ORAL at 18:19

## 2023-01-01 RX ADMIN — HEPARIN SODIUM 5000 UNIT(S): 5000 INJECTION INTRAVENOUS; SUBCUTANEOUS at 05:31

## 2023-01-01 RX ADMIN — OCTREOTIDE ACETATE 10 MICROGRAM(S)/HR: 200 INJECTION, SOLUTION INTRAVENOUS; SUBCUTANEOUS at 18:00

## 2023-01-01 RX ADMIN — SODIUM CHLORIDE 100 MILLILITER(S): 9 INJECTION, SOLUTION INTRAVENOUS at 22:08

## 2023-01-01 RX ADMIN — INSULIN HUMAN 5 UNIT(S): 100 INJECTION, SOLUTION SUBCUTANEOUS at 11:54

## 2023-01-01 RX ADMIN — SPIRONOLACTONE 25 MILLIGRAM(S): 25 TABLET, FILM COATED ORAL at 05:09

## 2023-01-01 RX ADMIN — Medication 3 UNIT(S): at 08:32

## 2023-01-01 RX ADMIN — Medication 50 MILLILITER(S): at 13:15

## 2023-01-01 RX ADMIN — Medication 25 MILLIGRAM(S): at 12:43

## 2023-01-01 RX ADMIN — SODIUM CHLORIDE 2 GRAM(S): 9 INJECTION INTRAMUSCULAR; INTRAVENOUS; SUBCUTANEOUS at 17:24

## 2023-01-01 RX ADMIN — Medication 2: at 08:30

## 2023-01-01 RX ADMIN — SODIUM CHLORIDE 1000 MILLILITER(S): 9 INJECTION INTRAMUSCULAR; INTRAVENOUS; SUBCUTANEOUS at 17:58

## 2023-01-01 RX ADMIN — Medication 200 GRAM(S): at 18:19

## 2023-01-01 RX ADMIN — Medication 50 MILLILITER(S): at 18:19

## 2023-01-01 RX ADMIN — Medication 6 UNIT(S): at 17:57

## 2023-01-01 RX ADMIN — Medication 100 MILLIGRAM(S): at 11:15

## 2023-01-01 RX ADMIN — Medication 50 MILLILITER(S): at 12:18

## 2023-01-01 RX ADMIN — Medication 125 MICROGRAM(S): at 05:58

## 2023-01-01 RX ADMIN — PIPERACILLIN AND TAZOBACTAM 25 GRAM(S): 4; .5 INJECTION, POWDER, LYOPHILIZED, FOR SOLUTION INTRAVENOUS at 22:09

## 2023-01-01 RX ADMIN — Medication 25 MILLIGRAM(S): at 05:09

## 2023-01-01 RX ADMIN — Medication 200 GRAM(S): at 11:49

## 2023-01-01 RX ADMIN — Medication 100 MILLIGRAM(S): at 12:43

## 2023-01-01 RX ADMIN — SPIRONOLACTONE 25 MILLIGRAM(S): 25 TABLET, FILM COATED ORAL at 05:30

## 2023-01-01 RX ADMIN — SODIUM ZIRCONIUM CYCLOSILICATE 5 GRAM(S): 10 POWDER, FOR SUSPENSION ORAL at 13:48

## 2023-01-01 RX ADMIN — MIDODRINE HYDROCHLORIDE 2.5 MILLIGRAM(S): 2.5 TABLET ORAL at 10:25

## 2023-08-21 NOTE — H&P ADULT - NSICDXPASTMEDICALHX_GEN_ALL_CORE_FT
24-May-2017 07:35 24-May-2017 07:36 PAST MEDICAL HISTORY:  GERD (gastroesophageal reflux disease)     History of bradycardia     History of cirrhosis of liver     History of seizure     T2DM (type 2 diabetes mellitus)      PAST MEDICAL HISTORY:  GERD (gastroesophageal reflux disease)     History of bradycardia     History of cirrhosis of liver     History of seizure     HLD (hyperlipidemia)     HTN (hypertension)     T2DM (type 2 diabetes mellitus)

## 2023-08-21 NOTE — ED ADULT TRIAGE NOTE - CHIEF COMPLAINT QUOTE
68y M c/o LUQ abdominal pain, weakness/fatigue, lack of appetite, constipation x5 days. 100 temp at home, 97.9 oral in ER triage. denies N/V/D

## 2023-08-21 NOTE — ED PROVIDER NOTE - DIFFERENTIAL DIAGNOSIS
Differential Diagnosis PNA, colitis, diverticulitis, PE, viral illness, electrolyte abnormality, dehydration, gastritis, pancreatitis

## 2023-08-21 NOTE — ED PROVIDER NOTE - ATTENDING APP SHARED VISIT CONTRIBUTION OF CARE
This was a shared visit with SNEHAL. I reviewed and verified the documentation and independently performed the documented MDM.

## 2023-08-21 NOTE — H&P ADULT - PROBLEM SELECTOR PLAN 11
#DVT PPX: SCDs for potential paracentesis tomorrow in AM    #DISPO:  - PT and SW eval  - Palliative consulted to discuss GOC chronic  - continue home statin chronic  - hold home statin in setting of cirrhosis Chronic, however on admission presented with hypotension SBP the 80's that improved with IVF   - continue home coreg with holding parameters   - hold home ramipril in setting of MAIA and hyponatremia

## 2023-08-21 NOTE — H&P ADULT - PROBLEM SELECTOR PLAN 12
#DVT PPX: SCDs for potential paracentesis tomorrow in AM    #DISPO:  - PT and SW eval  - Palliative consulted to discuss GOC #DVT PPX: SCDs for potential paracentesis tomorrow in AM    #DISPO:  - Palliative consulted to discuss GOC #DVT PPX: will avoid chemical DVT ppx in the setting of anemia and hematochezia? chronic  - hold home statins due to transaminitis

## 2023-08-21 NOTE — ED ADULT NURSE NOTE - OBJECTIVE STATEMENT
Pt primarily Mele speaking brought in by family member c/o left lateral abd pain for few days with bloating, constipation, lack of appetite and  fever. afebrile in triage. Denies n/v/d. safety maintained, call bell within reach. Nursing care ongoing.

## 2023-08-21 NOTE — H&P ADULT - ASSESSMENT
67yo elaine speaking male PMH of liver cirrhosis s/p esophageal varices banding, situs inversus, HF?, bradycardia, HTN, T2DM, seizure, GERD presents to ED for 4-5 days of subjective fever, weakness, malaise, dec appetite, abd bloating and LUQ pain. Admitted for liver cirrhosis, ascites, HCC, and hyponatremia. 69yo elaine speaking male PMH of liver cirrhosis s/p esophageal varices banding, situs inversus, HF?, bradycardia, HTN, HLD, T2DM, seizure, GERD presents to ED for 4-5 days of subjective fever, weakness, malaise, dec appetite, abd bloating and LUQ pain. Admitted for liver cirrhosis, ascites, HCC, and hyponatremia. 67yo elaine speaking male PMH of liver cirrhosis s/p esophageal varices banding, situs inversus, HF?, bradycardia, HTN, HLD, T2DM, seizure disorder, GERD presents to ED for 4-5 days of subjective fever, weakness, malaise, dec appetite, abd bloating and LUQ pain. Admitted for liver cirrhosis with ascites, concerning for  HCC, complicated with sepsis and electrolyte inbalance/hyponatremia. 67yo elaine speaking male PMH of liver cirrhosis s/p esophageal varices banding, situs inversus, HF?, bradycardia, HTN, HLD, T2DM, seizure disorder, GERD presents to ED for 4-5 days of subjective fever, weakness, malaise, dec appetite, abd bloating and LUQ pain. Admitted for liver cirrhosis with ascites, concerning for  HCC, complicated with sepsis and electrolyte inbalance/hyponatremia.     67yo elaine speaking male PMH of liver cirrhosis s/p esophageal varices banding, situs inversus, HF?, bradycardia, HTN, HLD, T2DM, seizure disorder, GERD presents to ED for 4-5 days of subjective fever, weakness, malaise, decreased appetite, abd bloating and LUQ pain. Admitted for liver cirrhosis with ascites, concerning for  HCC, complicated with sepsis and electrolyte inbalance/hyponatremia.     67yo elaine speaking male PMH of liver cirrhosis s/p esophageal varices banding, situs inversus, HF?, bradycardia, HTN, HLD, T2DM, seizure disorder, GERD presents to ED for 4-5 days of subjective fever, weakness, malaise, decreased appetite, abd bloating and LUQ pain. Admitted for liver cirrhosis with ascites, concerning for HCC, complicated with sepsis possible secondary to SBP and electrolyte imbalance/hyponatremia.

## 2023-08-21 NOTE — ED ADULT NURSE NOTE - NSFALLUNIVINTERV_ED_ALL_ED
Bed/Stretcher in lowest position, wheels locked, appropriate side rails in place/Call bell, personal items and telephone in reach/Instruct patient to call for assistance before getting out of bed/chair/stretcher/Non-slip footwear applied when patient is off stretcher/[place] to call system/Physically safe environment - no spills, clutter or unnecessary equipment/Purposeful proactive rounding/Room/bathroom lighting operational, light cord in reach

## 2023-08-21 NOTE — H&P ADULT - NSHPPHYSICALEXAM_GEN_ALL_CORE
T(C): 36.7 (08-21-23 @ 18:00), Max: 36.7 (08-21-23 @ 18:00)  HR: 93 (08-21-23 @ 18:00) (93 - 96)  BP: 113/69 (08-21-23 @ 18:00) (87/60 - 113/69)  RR: 20 (08-21-23 @ 18:00) (18 - 20)  SpO2: 96% (08-21-23 @ 18:00) (96% - 98%)    GENERAL: patient appears cachetic, no acute distress  EYES: sclera clear, no exudates  ENMT: oropharynx clear without erythema, no exudates, moist mucous membranes, conjunctiva and sclera clear, PERRLA, EOMI  NECK: supple, soft, no thyromegaly noted  LUNGS: +expiratory wheezing. No rhonchi, or rales appreciated  HEART: soft S1/S2, regular rate and rhythm, no murmurs noted, no lower extremity edema  GASTROINTESTINAL: +abd distension, +mild LUQ tenderness.  normoactive bowel sounds, no palpable masses. No guarding, rebound or rigidity  VASCULAR: + DP pulses present, no varicose veins  INTEGUMENT: warm and dry, color normal, good skin turgor, no lesions noted  MUSCULOSKELETAL: no clubbing or cyanosis, no obvious deformity  NEUROLOGIC: AAOx3, good muscle tone in 4 extremities, no obvious sensory deficits  PSYCHIATRIC: mood is good, affect is congruent, linear and logical thought process  HEME/LYMPH: no obvious ecchymosis or petechiae T(C): 36.7 (08-21-23 @ 18:00), Max: 36.7 (08-21-23 @ 18:00)  HR: 93 (08-21-23 @ 18:00) (93 - 96)  BP: 113/69 (08-21-23 @ 18:00) (87/60 - 113/69)  RR: 20 (08-21-23 @ 18:00) (18 - 20)  SpO2: 96% (08-21-23 @ 18:00) (96% - 98%)    GENERAL: patient appears cachetic, no acute distress  EYES: sclera clear, no exudates  ENMT: oropharynx clear without erythema, no exudates, moist mucous membranes, conjunctiva and sclera clear, PERRLA, EOMI  NECK: supple, soft, no thyromegaly noted, no JVD  LUNGS: +expiratory wheezing. No rhonchi, or rales appreciated  HEART: soft S1/S2, regular rate and rhythm, no murmurs noted, no lower extremity edema  GASTROINTESTINAL: +abd distension, +mild LUQ tenderness.  normoactive bowel sounds, no palpable masses. No guarding, rebound or rigidity  VASCULAR: + DP pulses present, no varicose veins  INTEGUMENT: warm and dry, color normal, good skin turgor, no lesions noted  MUSCULOSKELETAL: no clubbing or cyanosis, no obvious deformity  NEUROLOGIC: AAOx3, good muscle tone in 4 extremities, no obvious sensory deficits  PSYCHIATRIC: mood is good, affect is congruent, linear and logical thought process  HEME/LYMPH: no obvious ecchymosis or petechiae T(C): 36.7 (08-21-23 @ 18:00), Max: 36.7 (08-21-23 @ 18:00)  HR: 93 (08-21-23 @ 18:00) (93 - 96)  BP: 113/69 (08-21-23 @ 18:00) (87/60 - 113/69)  RR: 20 (08-21-23 @ 18:00) (18 - 20)  SpO2: 96% (08-21-23 @ 18:00) (96% - 98%)    GENERAL: patient appears ill, no acute distress  ENMT:  moist mucous membranes,  NECK: supple, soft, no thyromegaly noted, no JVD  LUNGS: +expiratory wheezing. No rhonchi, or rales appreciated  HEART: soft S1/S2, regular rate and rhythm, no murmurs noted, no lower extremity edema  GASTROINTESTINAL: Noticed abdominal distention, tenderness on palpation, +BS No guarding, rebound or rigidity  VASCULAR: + DP pulses present, no varicose veins  INTEGUMENT: warm and dry, color normal, good skin turgor, no lesions noted  MUSCULOSKELETAL: no clubbing or cyanosis, no obvious deformity  NEUROLOGIC: AAOx3, good muscle tone in 4 extremities, no obvious sensory deficits  PSYCHIATRIC: mood is good, affect is congruent, linear and logical thought process T(C): 36.7 (08-21-23 @ 18:00), Max: 36.7 (08-21-23 @ 18:00)  HR: 93 (08-21-23 @ 18:00) (93 - 96)  BP: 113/69 (08-21-23 @ 18:00) (87/60 - 113/69)  RR: 20 (08-21-23 @ 18:00) (18 - 20)  SpO2: 96% (08-21-23 @ 18:00) (96% - 98%)    GENERAL: patient appears ill, no acute distress  ENMT:  moist mucous membranes,  NECK: supple, soft, no thyromegaly noted, no JVD  LUNGS: +expiratory wheezing. No rhonchi, or rales appreciated  HEART: soft S1/S2, regular rate and rhythm, no murmurs noted, no lower extremity edema  GASTROINTESTINAL: +abdominal distention, tenderness on palpation, +BS No guarding, rebound or rigidity  VASCULAR: + DP pulses present, no varicose veins  INTEGUMENT: warm and dry, color normal, good skin turgor, no lesions noted  MUSCULOSKELETAL: no clubbing or cyanosis, no obvious deformity  NEUROLOGIC: AAOx3, good muscle tone in 4 extremities, no obvious sensory deficits  PSYCHIATRIC: normal affect

## 2023-08-21 NOTE — ED PROVIDER NOTE - CONSTITUTIONAL, MLM
Chronically ill appearing, awake, alert, oriented to person, place, time/situation and in no apparent distress. normal...

## 2023-08-21 NOTE — H&P ADULT - NSHPSOCIALHISTORY_GEN_ALL_CORE
Patient Tobacco: former social smoker, quit 25 years ago  EtOH: former recreational alcohol drinker, quit 25 years ago  Recreational drug use: denies  Lives with: patient from [place], currently visiting family in [place]  Ambulates: independent  ADLs: independent

## 2023-08-21 NOTE — H&P ADULT - PROBLEM SELECTOR PLAN 6
known history of T2DM  - presents with hyperglycemia, glu 450 on admission, anion WNL  - s/p Humulin 5u x1 in ED  - Hold home oral meds  - Low dose insulin corrective scale  - Hypoglycemia protocol, fingerstick glucose QAC&HS   - Consistent carb/DASH diet  - F/u AM HbA1c known history of T2DM  - presents with hyperglycemia, glu 450 on admission, anion WNL  - s/p Humulin 5u x1 in ED  - Hold home oral meds: metformin and glimepiride  - Low dose insulin corrective scale  - Hypoglycemia protocol, fingerstick glucose QAC&HS   - Consistent carb/DASH diet  - F/u AM HbA1c known history of T2DM  - presents with hyperglycemia, glu 450 on admission, anion WNL  - s/p Humulin 5u x1 in ED  - Hold home oral meds: metformin and glimepiride  - Low dose insulin corrective scale  - Hypoglycemia protocol, fingerstick glucose QAC&HS   - Consistent carb/DASH diet  - F/u AM HbA1c  - Endo, Dr. Perlman consulted, f/u recs known history of T2DM  - presents with hyperglycemia, glu 450 on admission, anion WNL  - s/p Humulin 5u x1 in ED  - Hold home oral meds: metformin and glimepiride  - Moderate dose insulin corrective scale  - Started on lispro 3 units pre meals and Lantus 5 AM   - Endo Dr. Perlman consulted   - Hypoglycemia protocol, fingerstick glucose QAC&HS   - F/u AM HbA1c Patient reports one episode of red bright blood in the stools, denies history of hemorrhoids.   - F/up FOBT   - GI consulted   - Monitor Hb   - On PPI

## 2023-08-21 NOTE — ED PROVIDER NOTE - OBJECTIVE STATEMENT
69 yo M PMHx DM, HTN, HLD, GERD presents to ED with family c/o fever/chills, LUQ pain, generalized weakness, malaise, decreased po intake x 4 days. Pt here visiting from [place]. Denies N/V/D, chest pain, SOB, dizziness, urinary symptoms.

## 2023-08-21 NOTE — H&P ADULT - PROBLEM SELECTOR PLAN 1
pt presents with malaise, fatigue, abd bloating, LUQ tenderness likely 2/2 to HCC, liver cirrhosis, and ascites  - pt with hx of liver cirrhosis s/p esophageal varices banding  - abd distension on exam  - CT a/p:  Total situs inversus. Cirrhosis with a dominant liver mass concerning for primary HCC versus metastasis. Peritoneal carcinomatosis and moderate volume ascites.  - continue ceftriaxone 2g q24h to cover SBP  - NPO after midnight for potential paracentesis  - GI Dr. Law consulted, f/u recs  - Heme/Onc Dr. Winter group consulted, f/u recs pt presents with malaise, fatigue, abd bloating, LUQ tenderness likely 2/2 to HCC, liver cirrhosis, and ascites  - pt with hx of liver cirrhosis s/p esophageal varices banding  - abd distension on exam  - CT a/p:  Total situs inversus. Cirrhosis with a dominant liver mass concerning for primary HCC versus metastasis. Peritoneal carcinomatosis and moderate volume ascites.  - continue ceftriaxone 2g q24h to cover SBP  - NPO after midnight for potential paracentesis  - GI Dr. Devi consulted, f/u recs  - Heme/Onc Dr. Winter group consulted, f/u recs -On admission, noted to have malaise, fatigue, abd bloating,   -LUQ tenderness likely 2/2 to HCC, liver cirrhosis, and ascites  - pt with hx of liver cirrhosis s/p esophageal varices banding  -Abdomen diffusely distended on exam  - CT a/p:  Total situs inversus. Cirrhosis with a dominant liver mass concerning for primary HCC versus metastasis. Peritoneal carcinomatosis and moderate volume ascites.  - continue ceftriaxone 2g q24h, needs coverage for SBP  - NPO after midnight for potential paracentesis  - GI Dr. Devi consulted, f/u recs  - Heme/Onc Dr. Winter group consulted, f/u recs -On admission, noted to have malaise, fatigue, abd bloating,   -LUQ tenderness likely 2/2 to HCC, liver cirrhosis, and ascites  - pt with hx of liver cirrhosis s/p esophageal varices banding  -Abdomen diffusely distended on exam  - CT a/p:  Total situs inversus. Cirrhosis with a dominant liver mass concerning for primary HCC versus metastasis. Peritoneal carcinomatosis and moderate volume ascites.  - continue ceftriaxone 2g q24h, needs coverage for SBP  - NPO after midnight for potential paracentesis  - hold home statin in setting of ascites  - GI Dr. Devi consulted, f/u recs  - Heme/Onc Dr. Winter group consulted, f/u recs Chronic, s/p esophageal [place]ices [place]   - Used to drink years ago   - AST 50, ALT and alkaline phosphatase wnl   - CT a/p:  Total situs inversus. Cirrhosis with a dominant liver mass concerning for primary HCC versus metastasis. Peritoneal carcinomatosis and moderate volume ascites. Discussed finding with patient   - GI Dr. Devi consulted, f/u recs  - Hold hepatotoxic meds Chronic, s/p esophageal varices banding [place]   - Used to drink alcohol years ago   - AST 50, ALT and alkaline phosphatase wnl   - CT a/p:  Total situs inversus. Cirrhosis with a dominant liver mass concerning for primary HCC versus metastasis. Peritoneal carcinomatosis and moderate volume ascites. Discussed finding with patient  and family   - GI Dr. Devi consulted, f/u recs  - Hold hepatotoxic meds

## 2023-08-21 NOTE — H&P ADULT - PROBLEM SELECTOR PLAN 4
MAIA  likely 2/2 ATN in the setting of dehydration  - Cr 1.8 on admission  - Baseline creatinine unknown   - s/p 2L NS bolus in ED  - continue gentle IVF, monitor for volume overload  - Monitor BMP  - Avoid nephrotoxic medications: hold home torsemide, spironolactone, and rampiril  - Monitor renal indices  - f/u urine studies  - Nephro Dr. Harper consulted, f/u recs MAIA  likely 2/2 ATN in the setting of dehydration  - Cr 1.8 on admission  - Baseline creatinine unknown   - s/p 2L NS bolus in ED  - hold further fluid for now  - Monitor BMP  - Avoid nephrotoxic medications: hold home torsemide, spironolactone, and rampiril  - Monitor renal indices  - f/u urine studies  - Nephro Dr. Harper consulted, f/u recs On admission patient met sepsis criteria due to hypotension, leukocytosis, tachy and elevated lactate. Unclear source  - s/p 2L NS bolus and Rocephin  in ED  - Will continue Rocephin 2g q24h  - Lactate 3 on admission, f/u repeat AM lactate  - Trend WBC and monitor for fever  - F/u BCx x2  - ID (Dr. Allred) consulted, f/u recs On admission patient met sepsis criteria due to hypotension, leukocytosis, tachy and elevated lactate. Unclear source  - s/p 2L NS bolus and Rocephin  in ED  - Will continue Rocephin 2g q24hm until cultures come back and ID recommendations   - Lactate 3 on admission, f/u repeat AM lactate  - Trend WBC and monitor for fever  - F/u BCx x2  - ID (Dr. Allred) consulted, f/u recs On admission patient met sepsis criteria due to hypotension, leukocytosis, tachy and elevated lactate. Unclear source but will cover for SBP  - s/p 2L NS bolus and Rocephin  in ED  - Will continue Rocephin 2g q24hm until cultures come back and ID recommendations   - Lactate 3 on admission, f/u repeat AM lactate  - Trend WBC and monitor for fever  - F/u BCx x2  - ID (Dr. Allred) consulted, f/u recs

## 2023-08-21 NOTE — ED PROVIDER NOTE - NSICDXPASTMEDICALHX_GEN_ALL_CORE_FT
PAST MEDICAL HISTORY:  GERD (gastroesophageal reflux disease)     History of bradycardia     History of cirrhosis of liver     History of seizure     HLD (hyperlipidemia)     HTN (hypertension)     T2DM (type 2 diabetes mellitus)

## 2023-08-21 NOTE — H&P ADULT - PROBLEM SELECTOR PLAN 7
expiratory wheezing on exam  - pt states no hx of asthma, not on any inhalers  - ordered PRN duonebs and alb inhaler  - Pulm Dr. Denson consulted, f/u recs expiratory wheezing on exam  - pt states no hx of asthma, not on any inhalers  - ordered PRN duonebs and alb inhaler  - f/u chest xray  - Pulm Dr. Denson consulted, f/u recs Chronic, non- insulin dependent   - presents with hyperglycemia, glu 450 on admission, downtrended to 269 s/p Humulin 5 units   - Hold home oral meds  - Moderate dose insulin corrective scale  - Started on lispro 3 units pre meals and Lantus 5 AM   - Endo Dr. Perlman consulted   - Hypoglycemia protocol, fingerstick glucose QAC&HS   - F/u AM HbA1c

## 2023-08-21 NOTE — H&P ADULT - PROBLEM SELECTOR PLAN 9
chronic  - continue home phenytoin chronic   - continue home coreg  - hold home Torsemide, ramipril, spironolactone in setting of MAIA and hyponatremia  - Cardio Dr. Swift group consulted, f/u recs chronic  - continue home phenytoin  - seizure precautions expiratory wheezing on exam  - pt states no hx of asthma, not on any inhalers  - ordered PRN duonebs and alb inhaler  - f/u chest xray  - Pulm Dr. Denson consulted, f/u recs

## 2023-08-21 NOTE — H&P ADULT - NSHPREVIEWOFSYSTEMS_GEN_ALL_CORE
CONSTITUTIONAL: +fever, fatigue, weakness. Denies chills, diaphoresis, dizziness, lightheadedness  HEENT: denies blurred vision, sore throat, cough  SKIN: denies new lesions, rash, hives, itching  CARDIOVASCULAR: denies chest pain, chest pressure, palpitations  RESPIRATORY: denies shortness of breath, PETERSON, wheezing, sputum production  GASTROINTESTINAL: +dec appetite, +LUQ pain, +hematochezia, +abd bloating. Denies nausea, vomiting, diarrhea, constipation, melena  GENITOURINARY: denies dysuria, polyuria, hematuria, discharge  NEUROLOGICAL: denies syncope, LOC, numbness, headache, focal weakness  MUSCULOSKELETAL: denies new joint pain, joint swelling, muscle aches  HEMATOLOGIC: denies gross bleeding, bruising  LYMPHATICS: denies enlarged lymph nodes, extremity swelling  PSYCHIATRIC: denies recent changes in anxiety, depression  ENDOCRINOLOGIC: denies sweating, cold or heat intolerance CONSTITUTIONAL: admits fever,  fatigue, weakness. Denies chills, diaphoresis, dizziness, lightheadedness  HEENT: denies blurred vision, sore throat, cough  SKIN: denies new lesions, rash, hives, itching  CARDIOVASCULAR: denies chest pain, chest pressure, palpitations  RESPIRATORY: denies shortness of breath, PETERSON, wheezing, sputum production  GASTROINTESTINAL: +dec appetite, +LUQ pain, +hematochezia, +abd bloating. Denies nausea, vomiting, diarrhea, constipation, melena  GENITOURINARY: denies dysuria, polyuria, hematuria, discharge  NEUROLOGICAL: denies syncope, LOC, numbness, headache, focal weakness  MUSCULOSKELETAL: denies new joint pain, joint swelling, muscle aches  HEMATOLOGIC: denies gross bleeding, bruising  LYMPHATICS: denies enlarged lymph nodes, extremity swelling  PSYCHIATRIC: denies recent changes in anxiety, depression  ENDOCRINOLOGIC: denies sweating, cold or heat intolerance

## 2023-08-21 NOTE — H&P ADULT - PROBLEM SELECTOR PLAN 10
chronic   - continue home coreg  - hold home Torsemide, ramipril, spironolactone in setting of MAIA and hyponatremia  - Cardio Dr. Swift group consulted, f/u recs (0) independent chronic  - continue home phenytoin chronic  - continue home phenytoin  - seizure precautions

## 2023-08-21 NOTE — H&P ADULT - PROBLEM SELECTOR PLAN 2
- pt presents with malaise, fatigue, abd bloating, LUQ tenderness likely 2/2 to HCC, liver cirrhosis, and ascites  CT a/p: Total situs inversus. Cirrhosis with a dominant liver mass concerning for primary HCC versus metastasis. Peritoneal carcinomatosis and moderate volume ascites.  - Heme/Onc Dr. Winter group consulted, f/u recs  - GI Dr. Law consulted, f/u recs  - Palliative consulted for GOC, f/u recs - pt presents with malaise, fatigue, abd bloating, LUQ tenderness likely 2/2 to HCC, liver cirrhosis, and ascites  - CT a/p: Total situs inversus. Cirrhosis with a dominant liver mass concerning for primary HCC versus metastasis. Peritoneal carcinomatosis and moderate volume ascites.  - Heme/Onc Dr. Winter group consulted, f/u recs  - GI Dr. Law consulted, f/u recs  - Palliative consulted for GOC, f/u recs - pt presents with malaise, fatigue, abd bloating, LUQ tenderness likely 2/2 to HCC, liver cirrhosis, and ascites  - CT a/p: Total situs inversus. Cirrhosis with a dominant liver mass concerning for primary HCC versus metastasis. Peritoneal carcinomatosis and moderate volume ascites.  - Heme/Onc Dr. Winter group consulted, f/u recs  - GI Dr. Devi consulted, f/u recs  - Palliative consulted for GOC, f/u recs - CT a/p: Highly suspicious for primary HCC versus metastasis. Peritoneal carcinomatosis and moderate volume ascites.  - Heme/Onc Dr. Winter group consulted, f/u recs  - GI Dr. Devi consulted, f/u recs  - Palliative consulted for GOC, f/u recs - CT a/p: Highly suspicious for primary HCC versus metastasis. Peritoneal carcinomatosis and moderate volume ascites.  - Heme/Onc Dr. Moy ang consulted, f/u recs  - GI Dr. Devi consulted, f/u recs

## 2023-08-21 NOTE — ED ADULT NURSE NOTE - PAIN: BODY LOCATION
LVM for patients mother regarding negative covid results. Callback number left for further questions or concerns.   abd

## 2023-08-21 NOTE — H&P ADULT - PROBLEM SELECTOR PLAN 8
Pt with questionable hx of HF, family states unsure hx of HF but patient has hx of bradycardia  - continue home coreg and digoxin with hold parameters  - hold home Torsemide, ramipril, spironolactone in setting of MAIA and hyponatremia  - f/u TTE  - Cardio Dr. Swift group consulted, f/u recs Pt with questionable hx of HF, family states unsure hx of HF but patient has hx of bradycardia  - continue home coreg and digoxin with hold parameters  - hold diuretics due to MAIA and hyponatremia  - f/u TTE  - Cardio Dr. Swift group consulted, f/u recs Pt with questionable hx of HF, family states unsure hx of HF   - continue home coreg and digoxin with hold parameters  - hold diuretics due to MAIA and hyponatremia  - f/u TTE  - Cardio Dr. Swift group consulted, f/u recs

## 2023-08-21 NOTE — ED PROVIDER NOTE - NS ED ATTENDING STATEMENT MOD
This was a shared visit with the SNEHAL. I reviewed and verified the documentation and independently performed the documented:

## 2023-08-21 NOTE — H&P ADULT - ATTENDING COMMENTS
67yo elaine speaking male PMH of liver cirrhosis s/p esophageal varices banding, situs inversus, HF?, bradycardia, HTN, HLD, T2DM, seizure disorder, GERD presents to ED for 4-5 days of subjective fever, weakness, malaise, decreased appetite, abd bloating and LUQ pain. Admitted for liver cirrhosis with ascites, concerning for HCC, complicated with sepsis possible secondary to SBP and electrolyte imbalance/hyponatremia. Admit to medicine. Monitor Na. Given 2L of NS boluses, No more IV NS. Check BMP STAT. Will not fluid restrict given suspicion of rapid overcorrection. Sepsis POA - rule out SBP. Continue empiric rocephin. Follow up culture data. Glycemic control for uncontrolled DM2. GI/ID/Nephrology consults - f/u recs.    Agree with H&P as outlined above, edited where appropriate.

## 2023-08-21 NOTE — H&P ADULT - PROBLEM SELECTOR PLAN 3
Patient meets sepsis criteria on admission: WBC >12, HR >90 , Suspicion of SBP  - CT a/p:  Total situs inversus. Cirrhosis with a dominant liver mass concerning for primary HCC versus metastasis. Peritoneal carcinomatosis and moderate volume ascites.  - continue rocephin 2g q24h  - s/p 2L NS bolus in ED  - continue gentle IVF, monitor for volume overload  - Lactate 3 on admission, f/u repeat AM lactate  - Trend WBC and monitor for fever  - F/u BCx x2  - ID (Dr. Allred) consulted, f/u recs On admission patient met sepsis criteria due to WBC >12, HR >90 , Suspicion of SBP  - continue rocephin 2g q24h  - s/p 2L NS bolus in ED  - continue gentle IVF, monitor for volume overload  - Lactate 3 on admission, f/u repeat AM lactate  - Trend WBC and monitor for fever  - F/u BCx x2  - ID (Dr. Allred) consulted, f/u recs On admission patient met sepsis criteria due to hypotension, leukocytosis, tachy and elevated lactate   - Will continue Rocephin 2g q24h  - s/p 2L NS bolus in ED  - Lactate 3 on admission, f/u repeat AM lactate  - Trend WBC and monitor for fever  - F/u BCx x2  - ID (Dr. Allred) consulted, f/u recs Na 122 on admission  - likely in setting of volume depletion and dehydration  - s/p 2L NS bolus in ED  - hold further fluid for now  - hold home torsemide, spironolactone, and ramipril  - monitor renal indices  - Nephro Dr. Harper consulted, f/u recs Na 122 on admission  - s/p 2L NS bolus in ED  - F/up stat BMP   - hold further fluid for now  - hold home torsemide, spironolactone, and ramipril  - monitor renal indices  - Nephro Dr. Harper consulted, f/u recs

## 2023-08-21 NOTE — H&P ADULT - HISTORY OF PRESENT ILLNESS
68F PMH    M/F PMH presents to ED for     Denies fever, chills, chest pain, palpitations, SOB, cough, abdominal pain, nausea, vomiting, diarrhea, constipation, hematochezia, melena, urinary frequency, urgency, dysuria, hematuria, headaches, changes in vision, dizziness, numbness, tingling. No other complaints at this time.    ED course:  Vitals: BP: 87/60, HR: 96, Temp: 97.9, RR: 18, SpO2: 98% on RA  Labs significant for: WBC 14.02, Hgb 12.9, Plt 471, Na 122, Cl 93, BUN 25, Cr 1.8, Glu 450, Alb 2.1, AST 51, eGFR 41, Lactate 3  In ED given Rocephin 1g x1, Humulin 5u x1, NS bolus 1L x2    Imaging  CT a/p:  Total situs inversus. Cirrhosis with a dominant liver mass concerning for primary HCC versus metastasis. Peritoneal carcinomatosis and moderate volume ascites.   67yo elaine speaking PMH of liver cirrhosis s/p esophageal varices banding, situs inversus, HF?, bradycardia, HTN, T2DM, seizure, GERD presents to ED for 4-5 days of subjective fever, weakness, malaise, dec appetite, abd bloating and LUQ pain. Patient is from [place] and [place] 7[place]25/23. Son and wife used as elaine  at bedside. Starting 4-5 days ago patient has been having progressive worsening weakness, dec appetite, and abd bloating with subjective fever. Starting yesterday patient began to have intermittent LUQ pain. Pt also notes 1 bloody stool with a little bright red blood 2 days ago. As per family, patient denies chills, chest pain, palpitations, SOB, cough, nausea, vomiting, diarrhea, constipation, melena, urinary frequency, urgency, dysuria, hematuria, headaches, changes in vision, dizziness, numbness, tingling. No other complaints at this time.    ED course:  Vitals: BP: 87/60, HR: 96, Temp: 97.9, RR: 18, SpO2: 98% on RA  Labs significant for: WBC 14.02, Hgb 12.9, Plt 471, Na 122, Cl 93, BUN 25, Cr 1.8, Glu 450, Alb 2.1, AST 51, eGFR 41, Lactate 3  In ED given Rocephin 1g x1, Humulin 5u x1, NS bolus 1L x2    Imaging  CT a/p:  Total situs inversus. Cirrhosis with a dominant liver mass concerning for primary HCC versus metastasis. Peritoneal carcinomatosis and moderate volume ascites.   67yo elaine speaking PMH of liver cirrhosis s/p esophageal varices banding, situs inversus, HF?, bradycardia, HTN, HLD, T2DM, seizure, GERD presents to ED for 4-5 days of subjective fever, weakness, malaise, dec appetite, abd bloating and LUQ pain. Patient is from [place] and came [place] on 7[place]25/23. Son and wife used as elaine  at bedside. Starting 4-5 days ago patient has been having progressive worsening weakness, dec appetite, and abd bloating with subjective fever. Starting yesterday patient began to have intermittent LUQ pain. Pt also notes 1 bloody stool with a little bright red blood 2 days ago. As per family, patient denies chills, chest pain, palpitations, SOB, cough, nausea, vomiting, diarrhea, constipation, melena, urinary frequency, urgency, dysuria, hematuria, headaches, changes in vision, dizziness, numbness, tingling. No other complaints at this time.    ED course:  Vitals: BP: 87/60, HR: 96, Temp: 97.9, RR: 18, SpO2: 98% on RA  Labs significant for: WBC 14.02, Hgb 12.9, Plt 471, Na 122, Cl 93, BUN 25, Cr 1.8, Glu 450, Alb 2.1, AST 51, eGFR 41, Lactate 3  UA: +glu >1000  In ED given Rocephin 1g x1, Humulin 5u x1, NS bolus 1L x2    Imaging  CT a/p:  Total situs inversus. Cirrhosis with a dominant liver mass concerning for primary HCC versus metastasis. Peritoneal carcinomatosis and moderate volume ascites.   69yo elaine speaking PMH of liver cirrhosis s/p esophageal varices banding, situs inversus, HF?, bradycardia, HTN, HLD, T2DM, seizure, GERD presents to ED due to subjective fever for the past 4-5 days,   Patient is from [place] and came [place] on 7/25/23. Son and wife used as Elaine  at bedside. He also reports weakness, malaise, poor appetite and abdominal bloating, symptoms kept persistently worsening and yesterday he started to have LUQ pain, describes pain as dull/cramping in nature, intermittent, no radiation, Pt also notes one episode of bloody stool with a little bright red blood 2 days ago. As per family, patient denies chills, chest pain, palpitations, SOB, cough, nausea, vomiting, diarrhea, constipation, melena, urinary frequency, urgency, dysuria, hematuria, headaches, changes in vision, dizziness, numbness, tingling. No other complaints at this time.    ED course:  Vitals: BP: 87/60, HR: 96, Temp: 97.9, RR: 18, SpO2: 98% on RA  Labs significant for: WBC 14.02, Hgb 12.9, Plt 471, Na 122, Cl 93, BUN 25, Cr 1.8, Glu 450, Alb 2.1, AST 51, eGFR 41, Lactate 3  UA: +glu >1000  In ED given Rocephin 1g x1, Humulin 5u x1, NS bolus 1L x2    Imaging  CT a/p:  Total situs inversus. Cirrhosis with a dominant liver mass concerning for primary HCC versus metastasis. Peritoneal carcinomatosis and moderate volume ascites.   67yo elaine speaking PMH of liver cirrhosis s/p esophageal varices banding, situs inversus, HF?, bradycardia, HTN, HLD, T2DM, seizure, GERD presents to ED due to subjective fever for the past 4-5 days, Patient is from [place] and came [place] on 7[place]25/23. Son and wife used as Elaine  at bedside. He also reports weakness, malaise, poor appetite and abdominal bloating, symptoms kept persistently worsening and yesterday he started to have LUQ pain, describes pain as dull/cramping in nature, intermittent, no radiation, Pt also notes one episode of bloody stool with a little bright red blood 2 days ago. As per family, patient denies chills, chest pain, palpitations, SOB, cough, nausea, vomiting, diarrhea, constipation, melena, urinary frequency, urgency, dysuria, hematuria, headaches, changes in vision, dizziness, numbness, tingling. No other complaints at this time.    ED course:  Vitals: BP: 87/60, HR: 96, Temp: 97.9, RR: 18, SpO2: 98% on RA  Labs significant for: WBC 14.02, Hgb 12.9, Plt 471, Na 122, Cl 93, BUN 25, Cr 1.8, Glu 450, Alb 2.1, AST 51, eGFR 41, Lactate 3  UA: +glu >1000  In ED given Rocephin 1g x1, Humulin 5u x1, NS bolus 1L x2    Imaging  CT a/p:  Total situs inversus. Cirrhosis with a dominant liver mass concerning for primary HCC versus metastasis. Peritoneal carcinomatosis and moderate volume ascites.   67 y/o Mele-speaking, refused , Son Ang translated at bedside. Patient has history of liver cirrhosis s/p esophageal varices banding, situs inversus, HF?, bradycardia, HTN, HLD, T2DM, seizure, GERD presents to ED due to subjective fever for the past 4-5 days associated with weakness, malaise, poor appetite and abdominal bloating, symptoms kept persistently worsening and yesterday he started to have LUQ pain, describes pain as dull/cramping in nature, intermittent, no radiation, Pt also notes one episode of bloody stool with a little bright red blood 2 days ago.  Patient lives in [facility] came to [place] to [place] family, full independent in ALD's, denies recent unintentional weight loss, nausea, vomiting, urinary symptoms, or other symptoms at this time     ED course:  Vitals: BP: 87/60-> 113/92, HR: 96, Temp: 97.9, RR: 18, SpO2: 98% on RA  Labs significant for: WBC 14.02, Hgb 12.9, Plt 471, Na 122, Cl 93, BUN 25, Cr 1.8, Glu 450, Alb 2.1, AST 51, eGFR 41, Lactate 3  UA: +glu >1000  CT a/p:  Total situs inversus. Cirrhosis with a dominant liver mass concerning for primary HCC versus metastasis. Peritoneal carcinomatosis and moderate volume ascites.  In ED given Rocephin 1g x1, Humulin 5u x1, NS bolus 1L x2     69 y/o Mele-speaking, refused , Son Ang translated at bedside. Patient has history of liver cirrhosis s/p esophageal varices banding, situs inversus, HF?, bradycardia, HTN, HLD, T2DM, seizure, GERD presents to ED due to subjective fever for the past 4-5 days associated with weakness, malaise, poor appetite and abdominal bloating, symptoms kept persistently worsening and yesterday he started to have LUQ pain, describes pain as dull/cramping in nature, intermittent, no radiation, Pt also notes one episode of bloody stool with a little bright red blood 2 days ago. Patient lives in [facility] came to [place] family, full independent in ADL's, denies recent unintentional weight loss, nausea, vomiting, urinary symptoms, or other symptoms at this time     ED course:  Vitals: BP: 87/60-> 113/92, HR: 96, Temp: 97.9, RR: 18, SpO2: 98% on RA  Labs significant for: WBC 14.02, Hgb 12.9, Plt 471, Na 122, Cl 93, BUN 25, Cr 1.8, Glu 450, Alb 2.1, AST 51, eGFR 41, Lactate 3  UA: +glu >1000  CT a/p:  Total situs inversus. Cirrhosis with a dominant liver mass concerning for primary HCC versus metastasis. Peritoneal carcinomatosis and moderate volume ascites.  In ED given Rocephin 1g x1, Humulin 5u x1, NS bolus 1L x2

## 2023-08-21 NOTE — H&P ADULT - PROBLEM SELECTOR PLAN 5
Na 122 on admission  - likely in setting of volume depletion and dehydration  - s/p 2L NS bolus in ED  - continue gentle IVF  - hold home torsemide, spironolactone, and ramipril  - monitor renal indices  - Nephro Dr. Harper consulted, f/u recs Na 122 on admission  - likely in setting of volume depletion and dehydration  - s/p 2L NS bolus in ED  - hold further fluid for now  - hold home torsemide, spironolactone, and ramipril  - monitor renal indices  - Nephro Dr. Harper consulted, f/u recs MAIA  in the setting of dehydration  - Cr 1.8 on admission  - Baseline creatinine unknown   - s/p 2L NS bolus in ED  - hold further fluid for now  - Monitor BMP  - Avoid nephrotoxic medications: hold home torsemide, spironolactone, and rampiril  - Monitor renal indices  - Nephro Dr. Harper consulted, f/u recs

## 2023-08-22 NOTE — PROGRESS NOTE ADULT - SUBJECTIVE AND OBJECTIVE BOX
Patient is a 68y old  Male who presents with a chief complaint of Liver Cirrhosis with ascites, Liver cancer, hyponatremia (22 Aug 2023 09:49)      Subjective:  INTERVAL HPI/OVERNIGHT EVENTS: Patient seen and examined at bedside. Patient had one episode of VTach overnight around 3AM which resolved without any intervention. Patient denied all associated symptoms during the event including chest pain and SOB. Patient no longer has the LUQ abdominal pain. He had one BM overnight with no blood in the stool. Patient endorses new onset L sided chest/rib pain that is dull in nature and rates it at a 2/10. Patient also reports lack of appetite and weakness that have remained unchanged since admission. Denies chills, headache, lightheadedness, palpitations, dyspnea, nausea, vomiting, diarrhea/constipation.    MEDICATIONS  (STANDING):  carvedilol 3.125 milliGRAM(s) Oral every 12 hours  cefTRIAXone   IVPB 2000 milliGRAM(s) IV Intermittent every 24 hours  dextrose 5%. 1000 milliLiter(s) (50 mL/Hr) IV Continuous <Continuous>  dextrose 5%. 1000 milliLiter(s) (100 mL/Hr) IV Continuous <Continuous>  dextrose 50% Injectable 25 Gram(s) IV Push once  dextrose 50% Injectable 12.5 Gram(s) IV Push once  dextrose 50% Injectable 25 Gram(s) IV Push once  digoxin     Tablet 125 MICROGram(s) Oral <User Schedule>  glucagon  Injectable 1 milliGRAM(s) IntraMuscular once  insulin glargine Injectable (LANTUS) 5 Unit(s) SubCutaneous every morning  insulin lispro (ADMELOG) corrective regimen sliding scale   SubCutaneous at bedtime  insulin lispro (ADMELOG) corrective regimen sliding scale   SubCutaneous three times a day before meals  insulin lispro Injectable (ADMELOG) 3 Unit(s) SubCutaneous before breakfast  insulin lispro Injectable (ADMELOG) 3 Unit(s) SubCutaneous before dinner  insulin lispro Injectable (ADMELOG) 3 Unit(s) SubCutaneous before lunch  phenytoin   Capsule 100 milliGRAM(s) Oral daily  rifAXIMin 550 milliGRAM(s) Oral two times a day    MEDICATIONS  (PRN):  albuterol/ipratropium for Nebulization 3 milliLiter(s) Nebulizer every 6 hours PRN Shortness of Breath and/or Wheezing  melatonin 3 milliGRAM(s) Oral at bedtime PRN Insomnia      Allergies    No Known Allergies    Intolerances        REVIEW OF SYSTEMS:  CONSTITUTIONAL: (+) Lack of appetite. No fever or chills  HEENT:  No headache, no sore throat  RESPIRATORY: No cough, wheezing, or shortness of breath  CARDIOVASCULAR: (+) Mild L sided chest pain. No palpitations  GASTROINTESTINAL: No abd pain, nausea, vomiting, or diarrhea  GENITOURINARY: No dysuria, frequency, or hematuria  NEUROLOGICAL: (+) Generalized weakness. No dizziness  MUSCULOSKELETAL: no myalgias     Objective:  Vital Signs Last 24 Hrs  T(C): 36.4 (22 Aug 2023 05:58), Max: 36.7 (21 Aug 2023 18:00)  T(F): 97.6 (22 Aug 2023 05:58), Max: 98 (21 Aug 2023 18:00)  HR: 109 (22 Aug 2023 11:00) (93 - 109)  BP: 106/74 (22 Aug 2023 11:00) (87/60 - 113/69)  BP(mean): --  RR: 19 (22 Aug 2023 05:58) (18 - 20)  SpO2: 95% (22 Aug 2023 11:00) (95% - 99%)    Parameters below as of 22 Aug 2023 11:00  Patient On (Oxygen Delivery Method): room air        GENERAL: NAD, lying in bed comfortably  HEAD:  Atraumatic, Normocephalic  EYES: EOMI, PERRLA, scleral icterus noted b/l  ENT: Moist oral mucous   NECK: Supple, No JVD  CHEST/LUNG: Clear to auscultation bilaterally; Expiratory wheezes b/l  HEART: S1,S2. Regular rate and rhythm. No murmurs  ABDOMEN: BSx4. Soft, nontender. Mildly distended. No rebound or guarding   EXTREMITIES: No clubbing, cyanosis, or edema  NERVOUS SYSTEM:  Alert & Oriented X3, speech clear. No acute deficit. Non focal     LABS:                        12.9   14.06 )-----------( 459      ( 22 Aug 2023 07:33 )             38.3     CBC Full  -  ( 22 Aug 2023 07:33 )  WBC Count : 14.06 K/uL  Hemoglobin : 12.9 g/dL  Hematocrit : 38.3 %  Platelet Count - Automated : 459 K/uL  Mean Cell Volume : 80.6 fl  Mean Cell Hemoglobin : 27.2 pg  Mean Cell Hemoglobin Concentration : 33.7 gm/dL  Auto Neutrophil # : 9.18 K/uL  Auto Lymphocyte # : 1.95 K/uL  Auto Monocyte # : 1.61 K/uL  Auto Eosinophil # : 1.12 K/uL  Auto Basophil # : 0.13 K/uL  Auto Neutrophil % : 65.2 %  Auto Lymphocyte % : 13.9 %  Auto Monocyte % : 11.5 %  Auto Eosinophil % : 8.0 %  Auto Basophil % : 0.9 %    22 Aug 2023 07:33    128    |  100    |  26     ----------------------------<  150    4.8     |  22     |  1.40     Ca    8.1        22 Aug 2023 07:33    TPro  7.4    /  Alb  2.1    /  TBili  0.6    /  DBili  x      /  AST  51     /  ALT  32     /  AlkPhos  110    22 Aug 2023 07:33    PT/INR - ( 22 Aug 2023 07:33 )   PT: 12.9 sec;   INR: 1.11 ratio         PTT - ( 22 Aug 2023 07:33 )  PTT:26.8 sec  Urinalysis Basic - ( 22 Aug 2023 07:33 )    Color: x / Appearance: x / SG: x / pH: x  Gluc: 150 mg/dL / Ketone: x  / Bili: x / Urobili: x   Blood: x / Protein: x / Nitrite: x   Leuk Esterase: x / RBC: x / WBC x   Sq Epi: x / Non Sq Epi: x / Bacteria: x      CAPILLARY BLOOD GLUCOSE      POCT Blood Glucose.: 171 mg/dL (22 Aug 2023 09:38)  POCT Blood Glucose.: 269 mg/dL (21 Aug 2023 22:05)          RADIOLOGY & ADDITIONAL TESTS:    < from: CT Abdomen and Pelvis w/ IV Cont (08.21.23 @ 17:48) >  IMPRESSION: Total situs inversus.  Cirrhosis with a dominant liver mass concerning for primary HCC versus   metastasis.    Peritoneal carcinomatosis and moderate volume ascites.        Personally reviewed.     Consultant(s) Notes Reviewed:  [x] YES  [ ] NO     Patient is a 68y old  Male who presents with a chief complaint of Liver Cirrhosis with ascites, Liver cancer, hyponatremia (22 Aug 2023 09:49)      Subjective:  INTERVAL HPI/OVERNIGHT EVENTS: Patient seen and examined at bedside. Patient had one episode of VTach overnight around 3AM which resolved without any intervention. Pt is Mele speaking,  ID [id number]. Patient denied all associated symptoms during the event including chest pain and SOB. Patient no longer has the LUQ abdominal pain. He had one BM overnight with no blood in the stool. Patient endorses new onset L sided chest/rib pain that is dull in nature and rates it at a 2/10. Patient also reports lack of appetite and weakness that have remained unchanged since admission. Denies chills, headache, lightheadedness, palpitations, dyspnea, nausea, vomiting, diarrhea/constipation.    MEDICATIONS  (STANDING):  carvedilol 3.125 milliGRAM(s) Oral every 12 hours  cefTRIAXone   IVPB 2000 milliGRAM(s) IV Intermittent every 24 hours  dextrose 5%. 1000 milliLiter(s) (50 mL/Hr) IV Continuous <Continuous>  dextrose 5%. 1000 milliLiter(s) (100 mL/Hr) IV Continuous <Continuous>  dextrose 50% Injectable 25 Gram(s) IV Push once  dextrose 50% Injectable 12.5 Gram(s) IV Push once  dextrose 50% Injectable 25 Gram(s) IV Push once  digoxin     Tablet 125 MICROGram(s) Oral <User Schedule>  glucagon  Injectable 1 milliGRAM(s) IntraMuscular once  insulin glargine Injectable (LANTUS) 5 Unit(s) SubCutaneous every morning  insulin lispro (ADMELOG) corrective regimen sliding scale   SubCutaneous at bedtime  insulin lispro (ADMELOG) corrective regimen sliding scale   SubCutaneous three times a day before meals  insulin lispro Injectable (ADMELOG) 3 Unit(s) SubCutaneous before breakfast  insulin lispro Injectable (ADMELOG) 3 Unit(s) SubCutaneous before dinner  insulin lispro Injectable (ADMELOG) 3 Unit(s) SubCutaneous before lunch  phenytoin   Capsule 100 milliGRAM(s) Oral daily  rifAXIMin 550 milliGRAM(s) Oral two times a day    MEDICATIONS  (PRN):  albuterol/ipratropium for Nebulization 3 milliLiter(s) Nebulizer every 6 hours PRN Shortness of Breath and/or Wheezing  melatonin 3 milliGRAM(s) Oral at bedtime PRN Insomnia      Allergies    No Known Allergies    Intolerances        REVIEW OF SYSTEMS:  CONSTITUTIONAL: (+) Lack of appetite. No fever or chills  HEENT:  No headache, no sore throat  RESPIRATORY: No cough, wheezing, or shortness of breath  CARDIOVASCULAR: (+) Mild L sided lower chest pain. No palpitations  GASTROINTESTINAL: No abd pain, nausea, vomiting, or diarrhea  GENITOURINARY: No dysuria, frequency, or hematuria  NEUROLOGICAL: (+) Generalized weakness. No dizziness  MUSCULOSKELETAL: no myalgias     Objective:  Vital Signs Last 24 Hrs  T(C): 36.4 (22 Aug 2023 05:58), Max: 36.7 (21 Aug 2023 18:00)  T(F): 97.6 (22 Aug 2023 05:58), Max: 98 (21 Aug 2023 18:00)  HR: 109 (22 Aug 2023 11:00) (93 - 109)  BP: 106/74 (22 Aug 2023 11:00) (87/60 - 113/69)  BP(mean): --  RR: 19 (22 Aug 2023 05:58) (18 - 20)  SpO2: 95% (22 Aug 2023 11:00) (95% - 99%)    Parameters below as of 22 Aug 2023 11:00  Patient On (Oxygen Delivery Method): room air        GENERAL: NAD, lying in bed comfortably  HEAD:  Atraumatic, Normocephalic  EYES: EOMI, PERRLA, +scleral icterus noted b/l  ENT: Moist oral mucous   NECK: Supple, No JVD  CHEST/LUNG: +TTP over L lower chest at the level of the xiphoid processClear to auscultation bilaterally; +Expiratory wheezes b/l  HEART: S1,S2. Regular rate and rhythm. No murmurs  ABDOMEN: BSx4. Soft, nontender. Mildly distended. No rebound or guarding   EXTREMITIES: No clubbing, cyanosis, or edema  NERVOUS SYSTEM:  Alert & Oriented X3, speech clear. No acute deficit. Non focal     LABS:                        12.9   14.06 )-----------( 459      ( 22 Aug 2023 07:33 )             38.3     CBC Full  -  ( 22 Aug 2023 07:33 )  WBC Count : 14.06 K/uL  Hemoglobin : 12.9 g/dL  Hematocrit : 38.3 %  Platelet Count - Automated : 459 K/uL  Mean Cell Volume : 80.6 fl  Mean Cell Hemoglobin : 27.2 pg  Mean Cell Hemoglobin Concentration : 33.7 gm/dL  Auto Neutrophil # : 9.18 K/uL  Auto Lymphocyte # : 1.95 K/uL  Auto Monocyte # : 1.61 K/uL  Auto Eosinophil # : 1.12 K/uL  Auto Basophil # : 0.13 K/uL  Auto Neutrophil % : 65.2 %  Auto Lymphocyte % : 13.9 %  Auto Monocyte % : 11.5 %  Auto Eosinophil % : 8.0 %  Auto Basophil % : 0.9 %    22 Aug 2023 07:33    128    |  100    |  26     ----------------------------<  150    4.8     |  22     |  1.40     Ca    8.1        22 Aug 2023 07:33    TPro  7.4    /  Alb  2.1    /  TBili  0.6    /  DBili  x      /  AST  51     /  ALT  32     /  AlkPhos  110    22 Aug 2023 07:33    PT/INR - ( 22 Aug 2023 07:33 )   PT: 12.9 sec;   INR: 1.11 ratio         PTT - ( 22 Aug 2023 07:33 )  PTT:26.8 sec  Urinalysis Basic - ( 22 Aug 2023 07:33 )    Color: x / Appearance: x / SG: x / pH: x  Gluc: 150 mg/dL / Ketone: x  / Bili: x / Urobili: x   Blood: x / Protein: x / Nitrite: x   Leuk Esterase: x / RBC: x / WBC x   Sq Epi: x / Non Sq Epi: x / Bacteria: x      CAPILLARY BLOOD GLUCOSE      POCT Blood Glucose.: 171 mg/dL (22 Aug 2023 09:38)  POCT Blood Glucose.: 269 mg/dL (21 Aug 2023 22:05)          RADIOLOGY & ADDITIONAL TESTS:    < from: CT Abdomen and Pelvis w/ IV Cont (08.21.23 @ 17:48) >  IMPRESSION: Total situs inversus.  Cirrhosis with a dominant liver mass concerning for primary HCC versus   metastasis.    Peritoneal carcinomatosis and moderate volume ascites.        Personally reviewed.     Consultant(s) Notes Reviewed:  [x] YES  [ ] NO

## 2023-08-22 NOTE — CONSULT NOTE ADULT - ASSESSMENT
67 y/o Mele-speaking, refused , Son Ang translated at bedside. Patient has history of liver cirrhosis s/p esophageal varices banding, situs inversus, HF?, bradycardia, HTN, HLD, T2DM, seizure, GERD presents to ED due to subjective fever for the past 4-5 days associated with weakness, malaise, poor appetite and abdominal bloating, symptoms kept persistently worsening and yesterday he started to have LUQ pain,    wheezing  HTN  DM  Cirrhosis  GERD  Liver mass  esoph varices   67 y/o Mele-speaking, refused , Son Ang translated at bedside. Patient has history of liver cirrhosis s/p esophageal varices banding, situs inversus, HF?, bradycardia, HTN, HLD, T2DM, seizure, GERD presents to ED due to subjective fever for the past 4-5 days associated with weakness, malaise, poor appetite and abdominal bloating, symptoms kept persistently worsening and yesterday he started to have LUQ pain,    wheezing  HTN  DM  Cirrhosis  GERD  Liver mass  esoph varices    ct imaging reviewed  GI eval  cirrhosis - liver mass  MAIA - I and O - serial labs - replete lytes  lung bases - clear -   wheezing - episodic - on NEBS prn  monitor VS and HD and Sat  GOC discussion  DM care  serial FS  prognosis guarded  emp ABX in progress - eval for SBP

## 2023-08-22 NOTE — CONSULT NOTE ADULT - ASSESSMENT
68 M with End Stage Liver Disease/Decompensated Cirrhosis   now wtih Ascites/Liver Mass/?Peritoneal Carcinomatosis  IVAB for SBP prophylaxis  Oncology Eval  IR for paracentesis  check AFP  xifaxin 550 mg bid  renal eval  continue carvedilol

## 2023-08-22 NOTE — PROGRESS NOTE ADULT - PROBLEM SELECTOR PLAN 11
Chronic, however on admission presented with hypotension SBP the 80's that improved with IVF   - Continue home coreg with holding parameters   - Hold home ramipril in setting of MAIA and hyponatremia    #HLD  Chronic  - Hold home statins due to transaminitis

## 2023-08-22 NOTE — PROGRESS NOTE ADULT - PROBLEM SELECTOR PLAN 8
Pt with questionable hx of HF, family states unsure hx of HF   - Continue home coreg and digoxin with hold parameters  - Hold diuretics due to MAIA and hyponatremia  - F/u TTE  - Cardio Dr. Swift group consulted, f/u recs Pt with questionable hx of HF, family states unsure hx of HF   - Continue home coreg   - dced digoxin as per cardio  - f/u dig levels   - Hold diuretics due to MAIA and hyponatremia  - F/u TTE  - Cardio Dr. Swift group consulted, f/u recs

## 2023-08-22 NOTE — CHART NOTE - NSCHARTNOTEFT_GEN_A_CORE
RN called for pt with two episodes of vtach.     At 03:05 with 11 beats of vtach   At 03:10 with 14 beats of vtach    Pt was asymptomatic and laying in bed comfortably.     EKG reviewed, no acute changes compared to EKG obtained on admission. Mg and phos ordered for the AM. RN called for pt with two episodes of vtach.     At 03:05 with 11 beats of vtach   At 03:10 with 14 beats of vtach    Pt was asymptomatic and laying in bed comfortably.     EKG reviewed, no acute changes compared to EKG obtained on admission. Mg and phos ordered for the AM.    ADDENDUM: Pt also with hyponatremia of 122 on admission. On f/u BNP, Na 130. Nephro consulted. F/u recs

## 2023-08-22 NOTE — CONSULT NOTE ADULT - SUBJECTIVE AND OBJECTIVE BOX
[facility]  INFECTIOUS DISEASES - Marco Allred, [address], [place] [address]  Tel: [phone]     Fax: [phone]  =======================================================    N-[id number]  HERMANN XAVIER     CC: Patient is a 68y old  Male who presents with a chief complaint of Liver Cirrhosis with ascites, Liver cancer, hyponatremia (22 Aug 2023 12:12)    HPI:  69 y/o Mele-speaking, refused , Son Ang translated at bedside. Patient has history of liver cirrhosis s/p esophageal varices banding, situs inversus, HF?, bradycardia, HTN, HLD, T2DM, seizure, GERD presents to ED due to subjective fever for the past 4-5 days associated with weakness, malaise, poor appetite and abdominal bloating, symptoms kept persistently worsening and yesterday he started to have LUQ pain, describes pain as dull/cramping in nature, intermittent, no radiation, Pt also notes one episode of bloody stool with a little bright red blood 2 days ago. Patient lives in [facility] [place] to [place] to [place] family, full independent in ADL's, denies recent unintentional weight loss, nausea, vomiting, urinary symptoms, or other symptoms at this time     ED course:  Vitals: BP: 87/60-> 113/92, HR: 96, Temp: 97.9, RR: 18, SpO2: 98% on RA  Labs significant for: WBC 14.02, Hgb 12.9, Plt 471, Na 122, Cl 93, BUN 25, Cr 1.8, Glu 450, Alb 2.1, AST 51, eGFR 41, Lactate 3  UA: +glu >1000  CT a/p:  Total situs inversus. Cirrhosis with a dominant liver mass concerning for primary HCC versus metastasis. Peritoneal carcinomatosis and moderate volume ascites.  In ED given Rocephin 1g x1, Humulin 5u x1, NS bolus 1L x2 (21 Aug 2023 18:33)      PAST MEDICAL & SURGICAL HISTORY:  T2DM (type 2 diabetes mellitus)      History of seizure      History of bradycardia      GERD (gastroesophageal reflux disease)      History of cirrhosis of liver      HLD (hyperlipidemia)      HTN (hypertension)      H/O esophageal varices          Social Hx:     FAMILY HISTORY:  FH: type 2 diabetes (Mother)        Allergies    No Known Allergies    Intolerances        Antibiotics:  MEDICATIONS  (STANDING):  carvedilol 3.125 milliGRAM(s) Oral every 12 hours  cefTRIAXone   IVPB 2000 milliGRAM(s) IV Intermittent every 24 hours  dextrose 5%. 1000 milliLiter(s) (50 mL/Hr) IV Continuous <Continuous>  dextrose 5%. 1000 milliLiter(s) (100 mL/Hr) IV Continuous <Continuous>  dextrose 50% Injectable 25 Gram(s) IV Push once  dextrose 50% Injectable 12.5 Gram(s) IV Push once  dextrose 50% Injectable 25 Gram(s) IV Push once  glucagon  Injectable 1 milliGRAM(s) IntraMuscular once  heparin   Injectable 5000 Unit(s) SubCutaneous every 8 hours  insulin glargine Injectable (LANTUS) 5 Unit(s) SubCutaneous every morning  insulin lispro (ADMELOG) corrective regimen sliding scale   SubCutaneous three times a day before meals  insulin lispro (ADMELOG) corrective regimen sliding scale   SubCutaneous at bedtime  insulin lispro Injectable (ADMELOG) 3 Unit(s) SubCutaneous before breakfast  insulin lispro Injectable (ADMELOG) 3 Unit(s) SubCutaneous before dinner  insulin lispro Injectable (ADMELOG) 3 Unit(s) SubCutaneous before lunch  phenytoin   Capsule 100 milliGRAM(s) Oral daily  rifAXIMin 550 milliGRAM(s) Oral two times a day    MEDICATIONS  (PRN):  albuterol/ipratropium for Nebulization 3 milliLiter(s) Nebulizer every 6 hours PRN Shortness of Breath and/or Wheezing  melatonin 3 milliGRAM(s) Oral at bedtime PRN Insomnia       REVIEW OF SYSTEMS:  CONSTITUTIONAL:  +Fatigue, No Fever or chills  HEENT:  No sore throat or runny nose.  CARDIOVASCULAR:  No chest pain or SOB.  RESPIRATORY:  No cough, shortness of breath  GASTROINTESTINAL:  +LUQ pain, No nausea, vomiting or diarrhea.  GENITOURINARY:  No dysuria, frequency or urgency  MUSCULOSKELETAL:  no joint aches, no muscle pain  SKIN:  No change in skin, hair or nails.  NEUROLOGIC:  No headache or dizziness  PSYCHIATRIC:  No disorder of thought or mood.    Physical Exam:  Vital Signs Last 24 Hrs  T(C): 36.9 (22 Aug 2023 11:58), Max: 36.9 (22 Aug 2023 11:58)  T(F): 98.5 (22 Aug 2023 11:58), Max: 98.5 (22 Aug 2023 11:58)  HR: 109 (22 Aug 2023 11:58) (93 - 109)  BP: 107/74 (22 Aug 2023 11:58) (87/60 - 113/69)  BP(mean): --  RR: 18 (22 Aug 2023 11:58) (18 - 20)  SpO2: 93% (22 Aug 2023 11:58) (93% - 99%)    Parameters below as of 22 Aug 2023 11:58  Patient On (Oxygen Delivery Method): room air      Height (cm): 175.3 (08-21 @ 16:01)  Weight (kg): 59.9 (08-22 @ 05:58)  BMI (kg/m2): 19.5 (08-22 @ 05:58)  BSA (m2): 1.73 (08-22 @ 05:58)  GEN: NAD  HEENT: normocephalic and atraumatic.   NECK: Supple.   LUNGS: +Expiratory wheezes B/l  HEART: Regular rate and rhythm   ABDOMEN: +LUQ TTP, +Distended, +Firm, Bowel sounds present    : No CVA tenderness  EXTREMITIES: No leg edema.  NEUROLOGIC: grossly intact.  PSYCHIATRIC: Appropriate affect .  SKIN: No ulceration or induration present.    Labs:  08-22    128<L>  |  100  |  26<H>  ----------------------------<  150<H>  4.8   |  22  |  1.40<H>    Ca    8.1<L>      22 Aug 2023 07:33    TPro  7.4  /  Alb  2.1<L>  /  TBili  0.6  /  DBili  x   /  AST  51<H>  /  ALT  32  /  AlkPhos  110  08-22                          12.9   14.06 )-----------( 459      ( 22 Aug 2023 07:33 )             38.3     PT/INR - ( 22 Aug 2023 07:33 )   PT: 12.9 sec;   INR: 1.11 ratio         PTT - ( 22 Aug 2023 07:33 )  PTT:26.8 sec  Urinalysis Basic - ( 22 Aug 2023 07:33 )    Color: x / Appearance: x / SG: x / pH: x  Gluc: 150 mg/dL / Ketone: x  / Bili: x / Urobili: x   Blood: x / Protein: x / Nitrite: x   Leuk Esterase: x / RBC: x / WBC x   Sq Epi: x / Non Sq Epi: x / Bacteria: x      LIVER FUNCTIONS - ( 22 Aug 2023 07:33 )  Alb: 2.1 g/dL / Pro: 7.4 g/dL / ALK PHOS: 110 U/L / ALT: 32 U/L / AST: 51 U/L / GGT: x                       SARS-CoV-2 Result: NotDetec (08-21-23 @ 17:00)      RECENT CULTURES:        All imaging and other data have been reviewed.    CT Ab/ Pelvis 08/21/23  FINDINGS:  LOWER CHEST: Total situs inversus.    LIVER: Cirrhosis. A segment 8/4 A exophytic peripherally enhancing   low-density mass extends superiorly to the diaphragm, measuring   approximately 5.2 x 4.1 cm. Additional 5 cm cyst low-density lesions that   are too small to characterize.  BILE DUCTS: Normal caliber.  GALLBLADDER: Within normal limits.  SPLEEN: Within normal limits.  PANCREAS: Within normal limits.  ADRENALS: Within normal limits.  KIDNEYS/URETERS: Within normal limits.    BLADDER: Within normal limits.  REPRODUCTIVE ORGANS: Mildly prominent prostate gland.    BOWEL: No bowel obstruction.  PERITONEUM: Moderate volume ascites with soft tissue implants diffusely   along the peritoneum as well as the serosal surface of the sigmoid colon.   A perisigmoid implant measures approximately 3.9 x 2.8 cm in greatest   transaxial dimension. Soft tissue implants are noted along the falciform   ligament. Omental nodularity is present.  VESSELS: Patent portal, superior mesenteric, and splenic veins. Diffuse   abdominal collaterals..  RETROPERITONEUM/LYMPH NODES: No lymphadenopathy.  ABDOMINAL WALL: Left inguinal hernia containing fluid and fat.  BONES: Degenerative changes.    IMPRESSION: Total situs inversus.  Cirrhosis with a dominant liver mass concerning for primary HCC versus   metastasis.    Peritoneal carcinomatosis and moderate volume ascites.        Assessment and Plan: Patient is a 67yo M with a PMH of liver cirrhosis s/p esophageal varices banding, situs inversus, HF?, bradycardia, HTN, HLD, T2DM, seizure, GERD who was admitted for for liver cirrhosis with ascites concerning for HCC, complicated with sepsis possible secondary to SBP and electrolyte imbalance/hyponatremia. Overnight, patient had 2 episodes of Vtach. Patient has total situs inversus, and is only complaining of LUQ pain (where his liver is). CT ab/pelvis showed cirrhosis with a dominant liver mass concerning for primary HCC versus metastasis, and peritoneal carcinomatosis and moderate volume ascites. Patient's abdomen was distended and firm, and there was TTP in the LUQ. On PE there were some b/l expiratory wheezes. Patient is afebrile. Denies chills, headache, lightheadedness, palpitations, dyspnea, nausea, vomiting, diarrhea/constipation. Patient is scheduled to have IR drainage of the peritoneal fluid tomorrow 8/23.    - Continue IV Ceftriaxone 2g q24 to cover for SBP  - Lactate downtrending --> 3 to 1.7  - UA significant for total protein 24, glucose 250 and small amount of bilirubin   - Trend WBC  - Trend fevers --> currently afebrile   - F/u blood cultures  - F/u IR peritoneal fluid culture and PMN count         Thank you for courtesy of this consult.     Will follow.  Discussed with the primary team.        55 minutes spent on total encounter assessing patient, examination, chart review, counseling and coordinating care by the attending physician/nurse/care manager.  [facility]  INFECTIOUS DISEASES - Marco Allred, [address], [place] [address]  Tel: [phone]     Fax: [phone]  =======================================================    N-[id number]  HERMANN XAVIER     CC: Patient is a 68y old  Male who presents with a chief complaint of Liver Cirrhosis with ascites, Liver cancer, hyponatremia (22 Aug 2023 12:12)    HPI:  67 y/o Mele-speaking, refused , Son Ang translated at bedside. Patient has history of liver cirrhosis s/p esophageal varices banding, situs inversus, HF?, bradycardia, HTN, HLD, T2DM, seizure, GERD presents to ED due to subjective fever for the past 4-5 days associated with weakness, malaise, poor appetite and abdominal bloating, symptoms kept persistently worsening and yesterday he started to have LUQ pain, describes pain as dull/cramping in nature, intermittent, no radiation, Pt also notes one episode of bloody stool with a little bright red blood 2 days ago. Patient lives in [facility] came to [place] to [place] family, full independent in ADL's, denies recent unintentional weight loss, nausea, vomiting, urinary symptoms, or other symptoms at this time     ED course:  Vitals: BP: 87/60-> 113/92, HR: 96, Temp: 97.9, RR: 18, SpO2: 98% on RA  Labs significant for: WBC 14.02, Hgb 12.9, Plt 471, Na 122, Cl 93, BUN 25, Cr 1.8, Glu 450, Alb 2.1, AST 51, eGFR 41, Lactate 3  UA: +glu >1000  CT a/p:  Total situs inversus. Cirrhosis with a dominant liver mass concerning for primary HCC versus metastasis. Peritoneal carcinomatosis and moderate volume ascites.  In ED given Rocephin 1g x1, Humulin 5u x1, NS bolus 1L x2 (21 Aug 2023 18:33)      PAST MEDICAL & SURGICAL HISTORY:  T2DM (type 2 diabetes mellitus)      History of seizure      History of bradycardia      GERD (gastroesophageal reflux disease)      History of cirrhosis of liver      HLD (hyperlipidemia)      HTN (hypertension)      H/O esophageal varices          Social Hx:     FAMILY HISTORY:  FH: type 2 diabetes (Mother)        Allergies    No Known Allergies    Intolerances        Antibiotics:  MEDICATIONS  (STANDING):  carvedilol 3.125 milliGRAM(s) Oral every 12 hours  cefTRIAXone   IVPB 2000 milliGRAM(s) IV Intermittent every 24 hours  dextrose 5%. 1000 milliLiter(s) (50 mL/Hr) IV Continuous <Continuous>  dextrose 5%. 1000 milliLiter(s) (100 mL/Hr) IV Continuous <Continuous>  dextrose 50% Injectable 25 Gram(s) IV Push once  dextrose 50% Injectable 12.5 Gram(s) IV Push once  dextrose 50% Injectable 25 Gram(s) IV Push once  glucagon  Injectable 1 milliGRAM(s) IntraMuscular once  heparin   Injectable 5000 Unit(s) SubCutaneous every 8 hours  insulin glargine Injectable (LANTUS) 5 Unit(s) SubCutaneous every morning  insulin lispro (ADMELOG) corrective regimen sliding scale   SubCutaneous three times a day before meals  insulin lispro (ADMELOG) corrective regimen sliding scale   SubCutaneous at bedtime  insulin lispro Injectable (ADMELOG) 3 Unit(s) SubCutaneous before breakfast  insulin lispro Injectable (ADMELOG) 3 Unit(s) SubCutaneous before dinner  insulin lispro Injectable (ADMELOG) 3 Unit(s) SubCutaneous before lunch  phenytoin   Capsule 100 milliGRAM(s) Oral daily  rifAXIMin 550 milliGRAM(s) Oral two times a day    MEDICATIONS  (PRN):  albuterol/ipratropium for Nebulization 3 milliLiter(s) Nebulizer every 6 hours PRN Shortness of Breath and/or Wheezing  melatonin 3 milliGRAM(s) Oral at bedtime PRN Insomnia       REVIEW OF SYSTEMS:  CONSTITUTIONAL:  +Fatigue, No Fever or chills  HEENT:  No sore throat or runny nose.  CARDIOVASCULAR:  No chest pain or SOB.  RESPIRATORY:  No cough, shortness of breath  GASTROINTESTINAL:  +LUQ pain, No nausea, vomiting or diarrhea.  GENITOURINARY:  No dysuria, frequency or urgency  MUSCULOSKELETAL:  no joint aches, no muscle pain  SKIN:  No change in skin, hair or nails.  NEUROLOGIC:  No headache or dizziness  PSYCHIATRIC:  No disorder of thought or mood.    Physical Exam:  Vital Signs Last 24 Hrs  T(C): 36.9 (22 Aug 2023 11:58), Max: 36.9 (22 Aug 2023 11:58)  T(F): 98.5 (22 Aug 2023 11:58), Max: 98.5 (22 Aug 2023 11:58)  HR: 109 (22 Aug 2023 11:58) (93 - 109)  BP: 107/74 (22 Aug 2023 11:58) (87/60 - 113/69)  BP(mean): --  RR: 18 (22 Aug 2023 11:58) (18 - 20)  SpO2: 93% (22 Aug 2023 11:58) (93% - 99%)    Parameters below as of 22 Aug 2023 11:58  Patient On (Oxygen Delivery Method): room air      Height (cm): 175.3 (08-21 @ 16:01)  Weight (kg): 59.9 (08-22 @ 05:58)  BMI (kg/m2): 19.5 (08-22 @ 05:58)  BSA (m2): 1.73 (08-22 @ 05:58)  GEN: NAD  HEENT: normocephalic and atraumatic.   NECK: Supple.   LUNGS: +Expiratory wheezes B/l  HEART: Regular rate and rhythm   ABDOMEN: +LUQ TTP, +Distended, +Firm, Bowel sounds present    : No CVA tenderness  EXTREMITIES: No leg edema.  NEUROLOGIC: grossly intact.  PSYCHIATRIC: Appropriate affect .  SKIN: No ulceration or induration present.    Labs:  08-22    128<L>  |  100  |  26<H>  ----------------------------<  150<H>  4.8   |  22  |  1.40<H>    Ca    8.1<L>      22 Aug 2023 07:33    TPro  7.4  /  Alb  2.1<L>  /  TBili  0.6  /  DBili  x   /  AST  51<H>  /  ALT  32  /  AlkPhos  110  08-22                          12.9   14.06 )-----------( 459      ( 22 Aug 2023 07:33 )             38.3     PT/INR - ( 22 Aug 2023 07:33 )   PT: 12.9 sec;   INR: 1.11 ratio         PTT - ( 22 Aug 2023 07:33 )  PTT:26.8 sec  Urinalysis Basic - ( 22 Aug 2023 07:33 )    Color: x / Appearance: x / SG: x / pH: x  Gluc: 150 mg/dL / Ketone: x  / Bili: x / Urobili: x   Blood: x / Protein: x / Nitrite: x   Leuk Esterase: x / RBC: x / WBC x   Sq Epi: x / Non Sq Epi: x / Bacteria: x      LIVER FUNCTIONS - ( 22 Aug 2023 07:33 )  Alb: 2.1 g/dL / Pro: 7.4 g/dL / ALK PHOS: 110 U/L / ALT: 32 U/L / AST: 51 U/L / GGT: x                       SARS-CoV-2 Result: NotDetec (08-21-23 @ 17:00)      RECENT CULTURES:        All imaging and other data have been reviewed.    CT Ab/ Pelvis 08/21/23  FINDINGS:  LOWER CHEST: Total situs inversus.    LIVER: Cirrhosis. A segment 8/4 A exophytic peripherally enhancing   low-density mass extends superiorly to the diaphragm, measuring   approximately 5.2 x 4.1 cm. Additional 5 cm cyst low-density lesions that   are too small to characterize.  BILE DUCTS: Normal caliber.  GALLBLADDER: Within normal limits.  SPLEEN: Within normal limits.  PANCREAS: Within normal limits.  ADRENALS: Within normal limits.  KIDNEYS/URETERS: Within normal limits.    BLADDER: Within normal limits.  REPRODUCTIVE ORGANS: Mildly prominent prostate gland.    BOWEL: No bowel obstruction.  PERITONEUM: Moderate volume ascites with soft tissue implants diffusely   along the peritoneum as well as the serosal surface of the sigmoid colon.   A perisigmoid implant measures approximately 3.9 x 2.8 cm in greatest   transaxial dimension. Soft tissue implants are noted along the falciform   ligament. Omental nodularity is present.  VESSELS: Patent portal, superior mesenteric, and splenic veins. Diffuse   abdominal collaterals..  RETROPERITONEUM/LYMPH NODES: No lymphadenopathy.  ABDOMINAL WALL: Left inguinal hernia containing fluid and fat.  BONES: Degenerative changes.    IMPRESSION: Total situs inversus.  Cirrhosis with a dominant liver mass concerning for primary HCC versus   metastasis.    Peritoneal carcinomatosis and moderate volume ascites.        Assessment and Plan: Patient is a 69yo M with a PMH of liver cirrhosis s/p esophageal varices banding, situs inversus, HF?, bradycardia, HTN, HLD, T2DM, seizure, GERD who was admitted for for liver cirrhosis with ascites concerning for HCC, complicated with sepsis possible secondary to SBP and electrolyte imbalance/hyponatremia. Overnight, patient had 2 episodes of Vtach. Patient has total situs inversus, and is only complaining of LUQ pain (where his liver is). CT ab/pelvis showed cirrhosis with a dominant liver mass concerning for primary HCC versus metastasis, and peritoneal carcinomatosis and moderate volume ascites. Patient's abdomen was distended and firm, and there was TTP in the LUQ. On PE there were some b/l expiratory wheezes. Patient is afebrile. Denies chills, headache, lightheadedness, palpitations, dyspnea, nausea, vomiting, diarrhea/constipation. Patient is scheduled to have IR drainage of the peritoneal fluid tomorrow 8/23.    - Continue IV Ceftriaxone 2g q24 to cover for possible SBP   - Lactate downtrending --> 3 to 1.7  - UA significant for total protein 24, glucose 250 and small amount of bilirubin   - Trend WBC  - Trend fevers --> currently afebrile   - F/u blood cultures  - F/u IR peritoneal fluid culture and PMN count         Thank you for courtesy of this consult.     Will follow.  Discussed with the primary team.        55 minutes spent on total encounter assessing patient, examination, chart review, counseling and coordinating care by the attending physician/nurse/care manager.

## 2023-08-22 NOTE — CARE COORDINATION ASSESSMENT. - NSPASTMEDSURGHISTORY_GEN_ALL_CORE_FT
PAST MEDICAL & SURGICAL HISTORY:  HTN (hypertension)      HLD (hyperlipidemia)      History of cirrhosis of liver      GERD (gastroesophageal reflux disease)      History of bradycardia      History of seizure      T2DM (type 2 diabetes mellitus)      H/O esophageal varices

## 2023-08-22 NOTE — CONSULT NOTE ADULT - ASSESSMENT
Hyponatremia: Hypovolemic  MAIA, CKD 3: prerenal azotemia  Cirrhosis, Liver mass, Peritoneal carcinomatosis, Ascites  Hypertension    Improving sodium levels and renal indices. Gentle IV hydration. Monitor blood sugar levels. Insulin coverage as needed.   Malignancy work up. D/w patients family at bedside. Will follow electrolytes and renal function trend.     Further recommendations pending clinical course. Thank you for the courtesy of this referral.

## 2023-08-22 NOTE — CONSULT NOTE ADULT - SUBJECTIVE AND OBJECTIVE BOX
Patient is a 68y old  Male who presents with a chief complaint of Liver Cirrhosis with ascites, Liver cancer, hyponatremia (22 Aug 2023 05:55)    HPI:  69 y/o Mele-speaking, refused , Son Ang translated at bedside. Patient has history of liver cirrhosis s/p esophageal varices banding, situs inversus, HF?, bradycardia, HTN, HLD, T2DM, seizure, GERD presents to ED due to subjective fever for the past 4-5 days associated with weakness, malaise, poor appetite and abdominal bloating, symptoms kept persistently worsening and yesterday he started to have LUQ pain, describes pain as dull/cramping in nature, intermittent, no radiation, Pt also notes one episode of bloody stool with a little bright red blood 2 days ago. Patient lives in [facility] [place], full independent in ADL's, denies recent unintentional weight loss, nausea, vomiting, urinary symptoms, or other symptoms at this time     ED course:  Vitals: BP: 87/60-> 113/92, HR: 96, Temp: 97.9, RR: 18, SpO2: 98% on RA  Labs significant for: WBC 14.02, Hgb 12.9, Plt 471, Na 122, Cl 93, BUN 25, Cr 1.8, Glu 450, Alb 2.1, AST 51, eGFR 41, Lactate 3  UA: +glu >1000  CT a/p:  Total situs inversus. Cirrhosis with a dominant liver mass concerning for primary HCC versus metastasis. Peritoneal carcinomatosis and moderate volume ascites.  In ED given Rocephin 1g x1, Humulin 5u x1, NS bolus 1L x2 (21 Aug 2023 18:33)      PAST MEDICAL HISTORY:  T2DM (type 2 diabetes mellitus)    History of seizure    History of bradycardia    GERD (gastroesophageal reflux disease)    History of cirrhosis of liver    HLD (hyperlipidemia)    HTN (hypertension)        PAST SURGICAL HISTORY:  H/O esophageal varices        FAMILY HISTORY:  FH: type 2 diabetes (Mother)        SOCIAL HISTORY:    Allergies    No Known Allergies    Intolerances      Home Medications:  atorvastatin 10 mg oral tablet: 1 tab(s) orally once a day (21 Aug 2023 20:46)  Coreg 3.125 mg oral tablet: 1 tab(s) orally 2 times a day (21 Aug 2023 19:41)  digoxin 125 mcg (0.125 mg) oral tablet: 1 tab(s) orally once a day on Monday - Friday (21 Aug 2023 19:41)  glimepiride 1 mg oral tablet: 1 tab(s) orally once a day (21 Aug 2023 19:41)  MetFORMIN (Eqv-Fortamet) 1000 mg oral tablet, extended release: 1 tab(s) orally once a day (21 Aug 2023 19:41)  phenytoin 100 mg oral capsule: 1 tab(s) orally once a day (21 Aug 2023 19:41)  ramipril 2.5 mg oral tablet: 1 tab(s) orally once a day (21 Aug 2023 19:41)  spironolactone 50 mg oral tablet: 1 tab(s) orally once a day (21 Aug 2023 19:41)  torsemide 10 mg oral tablet: 1 tab(s) orally once a day (21 Aug 2023 19:41)    MEDICATIONS  (STANDING):  carvedilol 3.125 milliGRAM(s) Oral every 12 hours  cefTRIAXone   IVPB 2000 milliGRAM(s) IV Intermittent every 24 hours  dextrose 5%. 1000 milliLiter(s) (50 mL/Hr) IV Continuous <Continuous>  dextrose 5%. 1000 milliLiter(s) (100 mL/Hr) IV Continuous <Continuous>  dextrose 50% Injectable 12.5 Gram(s) IV Push once  dextrose 50% Injectable 25 Gram(s) IV Push once  dextrose 50% Injectable 25 Gram(s) IV Push once  digoxin     Tablet 125 MICROGram(s) Oral <User Schedule>  glucagon  Injectable 1 milliGRAM(s) IntraMuscular once  insulin glargine Injectable (LANTUS) 5 Unit(s) SubCutaneous every morning  insulin lispro (ADMELOG) corrective regimen sliding scale   SubCutaneous three times a day before meals  insulin lispro (ADMELOG) corrective regimen sliding scale   SubCutaneous at bedtime  insulin lispro Injectable (ADMELOG) 3 Unit(s) SubCutaneous before breakfast  insulin lispro Injectable (ADMELOG) 3 Unit(s) SubCutaneous before dinner  insulin lispro Injectable (ADMELOG) 3 Unit(s) SubCutaneous before lunch  phenytoin   Capsule 100 milliGRAM(s) Oral daily    MEDICATIONS  (PRN):  albuterol/ipratropium for Nebulization 3 milliLiter(s) Nebulizer every 6 hours PRN Shortness of Breath and/or Wheezing  melatonin 3 milliGRAM(s) Oral at bedtime PRN Insomnia      REVIEW OF SYSTEMS:  General:   Respiratory: No cough, SOB  Cardiovascular: No CP or Palpitations	  Gastrointestinal: No nausea, Vomiting. No diarrhea  Genitourinary: No urinary complaints	  Musculoskeletal: No leg swelling, No new rash or lesions	  Neurological: 	  all other systems negative    T(F): 97.6 (08-22-23 @ 05:58), Max: 98 (08-21-23 @ 18:00)  HR: 103 (08-22-23 @ 05:58) (93 - 103)  BP: 107/76 (08-22-23 @ 05:58) (87/60 - 113/69)  RR: 19 (08-22-23 @ 05:58) (18 - 20)  SpO2: 99% (08-22-23 @ 05:58) (96% - 99%)  Wt(kg): --    PHYSICAL EXAM:  General: NAD  Respiratory: b/l air entry  Cardiovascular: S1 S2  Gastrointestinal: soft  Extremities: edema        08-22    128<L>  |  100  |  26<H>  ----------------------------<  150<H>  4.8   |  22  |  1.40<H>    Ca    8.1<L>      22 Aug 2023 07:33    TPro  7.4  /  Alb  2.1<L>  /  TBili  0.6  /  DBili  x   /  AST  51<H>  /  ALT  32  /  AlkPhos  110  08-22                          12.9   14.06 )-----------( 459      ( 22 Aug 2023 07:33 )             38.3       Blood Urea Nitrogen: 26 mg/dL (08-22 @ 07:33)  Calcium: 8.1 mg/dL (08-22 @ 07:33)  Potassium: 4.8 mmol/L (08-22 @ 07:33)  Hemoglobin: 12.9 g/dL (08-22 @ 07:33)      Creatinine, Serum: 1.40 (08-22 @ 07:33)  Creatinine, Serum: 1.50 (08-22 @ 03:37)  Creatinine, Serum: 1.80 (08-21 @ 17:00)      Urinalysis Basic - ( 22 Aug 2023 07:33 )    Color: x / Appearance: x / SG: x / pH: x  Gluc: 150 mg/dL / Ketone: x  / Bili: x / Urobili: x   Blood: x / Protein: x / Nitrite: x   Leuk Esterase: x / RBC: x / WBC x   Sq Epi: x / Non Sq Epi: x / Bacteria: x      LIVER FUNCTIONS - ( 22 Aug 2023 07:33 )  Alb: 2.1 g/dL / Pro: 7.4 g/dL / ALK PHOS: 110 U/L / ALT: 32 U/L / AST: 51 U/L / GGT: x                       I&O's Detail           Patient is a 68y old  Male who presents with a chief complaint of Liver Cirrhosis with ascites, Liver cancer, hyponatremia (22 Aug 2023 05:55)    HPI:  67 y/o Mele-speaking, refused , Son Ang translated at bedside. Patient has history of liver cirrhosis s/p esophageal varices banding, situs inversus, HF?, bradycardia, HTN, HLD, T2DM, seizure, GERD presents to ED due to subjective fever for the past 4-5 days associated with weakness, malaise, poor appetite and abdominal bloating, symptoms kept persistently worsening and yesterday he started to have LUQ pain, describes pain as dull/cramping in nature, intermittent, no radiation, Pt also notes one episode of bloody stool with a little bright red blood 2 days ago. Patient lives in [facility] came to [place] family, full independent in ADL's, denies recent unintentional weight loss, nausea, vomiting, urinary symptoms, or other symptoms at this time     ED course:  Vitals: BP: 87/60-> 113/92, HR: 96, Temp: 97.9, RR: 18, SpO2: 98% on RA  Labs significant for: WBC 14.02, Hgb 12.9, Plt 471, Na 122, Cl 93, BUN 25, Cr 1.8, Glu 450, Alb 2.1, AST 51, eGFR 41, Lactate 3  UA: +glu >1000  CT a/p:  Total situs inversus. Cirrhosis with a dominant liver mass concerning for primary HCC versus metastasis. Peritoneal carcinomatosis and moderate volume ascites.  In ED given Rocephin 1g x1, Humulin 5u x1, NS bolus 1L x2 (21 Aug 2023 18:33)    Renal consult called for hyponatremia. History obtained from chart and patient's son at bedside.       PAST MEDICAL HISTORY:  T2DM (type 2 diabetes mellitus)    History of seizure    History of bradycardia    GERD (gastroesophageal reflux disease)    History of cirrhosis of liver    HLD (hyperlipidemia)    HTN (hypertension)        PAST SURGICAL HISTORY:  H/O esophageal varices        FAMILY HISTORY:  FH: type 2 diabetes (Mother)        SOCIAL HISTORY: No current smoking or alcohol use     Allergies    No Known Allergies    Intolerances      Home Medications:  atorvastatin 10 mg oral tablet: 1 tab(s) orally once a day (21 Aug 2023 20:46)  Coreg 3.125 mg oral tablet: 1 tab(s) orally 2 times a day (21 Aug 2023 19:41)  digoxin 125 mcg (0.125 mg) oral tablet: 1 tab(s) orally once a day on Monday - Friday (21 Aug 2023 19:41)  glimepiride 1 mg oral tablet: 1 tab(s) orally once a day (21 Aug 2023 19:41)  MetFORMIN (Eqv-Fortamet) 1000 mg oral tablet, extended release: 1 tab(s) orally once a day (21 Aug 2023 19:41)  phenytoin 100 mg oral capsule: 1 tab(s) orally once a day (21 Aug 2023 19:41)  ramipril 2.5 mg oral tablet: 1 tab(s) orally once a day (21 Aug 2023 19:41)  spironolactone 50 mg oral tablet: 1 tab(s) orally once a day (21 Aug 2023 19:41)  torsemide 10 mg oral tablet: 1 tab(s) orally once a day (21 Aug 2023 19:41)    MEDICATIONS  (STANDING):  carvedilol 3.125 milliGRAM(s) Oral every 12 hours  cefTRIAXone   IVPB 2000 milliGRAM(s) IV Intermittent every 24 hours  dextrose 5%. 1000 milliLiter(s) (50 mL/Hr) IV Continuous <Continuous>  dextrose 5%. 1000 milliLiter(s) (100 mL/Hr) IV Continuous <Continuous>  dextrose 50% Injectable 12.5 Gram(s) IV Push once  dextrose 50% Injectable 25 Gram(s) IV Push once  dextrose 50% Injectable 25 Gram(s) IV Push once  digoxin     Tablet 125 MICROGram(s) Oral <User Schedule>  glucagon  Injectable 1 milliGRAM(s) IntraMuscular once  insulin glargine Injectable (LANTUS) 5 Unit(s) SubCutaneous every morning  insulin lispro (ADMELOG) corrective regimen sliding scale   SubCutaneous three times a day before meals  insulin lispro (ADMELOG) corrective regimen sliding scale   SubCutaneous at bedtime  insulin lispro Injectable (ADMELOG) 3 Unit(s) SubCutaneous before breakfast  insulin lispro Injectable (ADMELOG) 3 Unit(s) SubCutaneous before dinner  insulin lispro Injectable (ADMELOG) 3 Unit(s) SubCutaneous before lunch  phenytoin   Capsule 100 milliGRAM(s) Oral daily    MEDICATIONS  (PRN):  albuterol/ipratropium for Nebulization 3 milliLiter(s) Nebulizer every 6 hours PRN Shortness of Breath and/or Wheezing  melatonin 3 milliGRAM(s) Oral at bedtime PRN Insomnia      REVIEW OF SYSTEMS:  General: weak  Respiratory: No cough, SOB  Cardiovascular: No CP or Palpitations	  Gastrointestinal: No nausea, Vomiting. No diarrhea  Genitourinary: No urinary complaints	  Musculoskeletal: No new rash or lesions		  all other systems negative    T(F): 97.6 (08-22-23 @ 05:58), Max: 98 (08-21-23 @ 18:00)  HR: 103 (08-22-23 @ 05:58) (93 - 103)  BP: 107/76 (08-22-23 @ 05:58) (87/60 - 113/69)  RR: 19 (08-22-23 @ 05:58) (18 - 20)  SpO2: 99% (08-22-23 @ 05:58) (96% - 99%)  Wt(kg): --    PHYSICAL EXAM:  General: NAD  Respiratory: b/l air entry  Cardiovascular: S1 S2  Gastrointestinal: soft  Extremities: edema        08-22    128<L>  |  100  |  26<H>  ----------------------------<  150<H>  4.8   |  22  |  1.40<H>    Ca    8.1<L>      22 Aug 2023 07:33    TPro  7.4  /  Alb  2.1<L>  /  TBili  0.6  /  DBili  x   /  AST  51<H>  /  ALT  32  /  AlkPhos  110  08-22                          12.9   14.06 )-----------( 459      ( 22 Aug 2023 07:33 )             38.3       Blood Urea Nitrogen: 26 mg/dL (08-22 @ 07:33)  Calcium: 8.1 mg/dL (08-22 @ 07:33)  Potassium: 4.8 mmol/L (08-22 @ 07:33)  Hemoglobin: 12.9 g/dL (08-22 @ 07:33)      Creatinine, Serum: 1.40 (08-22 @ 07:33)  Creatinine, Serum: 1.50 (08-22 @ 03:37)  Creatinine, Serum: 1.80 (08-21 @ 17:00)      Urinalysis Basic - ( 22 Aug 2023 07:33 )    Color: x / Appearance: x / SG: x / pH: x  Gluc: 150 mg/dL / Ketone: x  / Bili: x / Urobili: x   Blood: x / Protein: x / Nitrite: x   Leuk Esterase: x / RBC: x / WBC x   Sq Epi: x / Non Sq Epi: x / Bacteria: x      LIVER FUNCTIONS - ( 22 Aug 2023 07:33 )  Alb: 2.1 g/dL / Pro: 7.4 g/dL / ALK PHOS: 110 U/L / ALT: 32 U/L / AST: 51 U/L / GGT: x                     < from: CT Abdomen and Pelvis w/ IV Cont (08.21.23 @ 17:48) >    ACC: [id number] EXAM:  CT ABDOMEN AND PELVIS IC   ORDERED BY: IRIS REAVES     PROCEDURE DATE:  08/21/2023          INTERPRETATION:  CLINICAL INFORMATION: Left flank pain.    COMPARISON: None.    CONTRAST/COMPLICATIONS:  IV Contrast: Omnipaque 350  90 cc administered   10 cc discarded  Oral Contrast: NONE  Complications: None reported at time of study completion    PROCEDURE:  CT of the Abdomen and Pelvis was performed.  Sagittal and coronal reformats were performed.    FINDINGS:  LOWER CHEST: Total situs inversus.    LIVER: Cirrhosis. A segment 8/4 A exophytic peripherally enhancing   low-density mass extends superiorly to the diaphragm, measuring   approximately 5.2 x 4.1 cm. Additional 5 cm cyst low-density lesions that   are too small to characterize.  BILE DUCTS: Normal caliber.  GALLBLADDER: Within normal limits.  SPLEEN: Within normal limits.  PANCREAS: Within normal limits.  ADRENALS: Within normal limits.  KIDNEYS/URETERS: Within normal limits.    BLADDER: Within normal limits.  REPRODUCTIVE ORGANS: Mildly prominent prostate gland.    BOWEL: No bowel obstruction.  PERITONEUM: Moderate volume ascites with soft tissue implants diffusely   along the peritoneum as well as the serosal surface of the sigmoid colon.   A perisigmoid implant measures approximately 3.9 x 2.8 cm in greatest   transaxial dimension. Soft tissue implants are noted along the falciform   ligament. Omental nodularity is present.  VESSELS: Patent portal, superior mesenteric, and splenic veins. Diffuse   abdominal collaterals..  RETROPERITONEUM/LYMPH NODES: No lymphadenopathy.  ABDOMINAL WALL: Left inguinal hernia containing fluid and fat.  BONES: Degenerative changes.    IMPRESSION: Total situs inversus.  Cirrhosis with a dominant liver mass concerning for primary HCC versus   metastasis.    Peritoneal carcinomatosis and moderate volume ascites.        --- End of Report ---            RASHAUN BARRIENTOS MD; Attending Radiologist  This document has been electronically signed. Aug 21 2023  6:07PM    < end of copied text >        < from: Xray Chest 1 View AP/PA (08.21.23 @ 20:59) >    ACC: [id number] EXAM:  XR CHEST AP OR PA 1V   ORDERED BY: MARIYA RUST     PROCEDURE DATE:  08/21/2023          INTERPRETATION:  TIME OF EXAM: August 21, 2023 at 9:09 PM.    CLINICAL INFORMATION: Wheezing.    COMPARISON:  None available    TECHNIQUE: AP Portable chest x-ray.    INTERPRETATION:    There is situs inversus.  Heart size and the mediastinum cannot be accurately evaluated on this   projection.  The lungs are clear.  No pleural effusion or pneumothorax is seen.  There is an old left-sided ununited left clavicular fracture. There are   old left rib fractures.      IMPRESSION:  Situs inversus.    Clear lungs.    --- End of Report ---            ZACH ROJAS MD; Attending Radiologist  This document has been electronically signed. Aug 22 2023  3:13PM    < end of copied text >

## 2023-08-22 NOTE — PROGRESS NOTE ADULT - PROBLEM SELECTOR PLAN 5
MAIA  in the setting of dehydration  - Baseline creatinine unknown   - Cr 1.8 on admission --> 1.4 today  - S/p 2L NS bolus in ED  - Hold further fluids for now  - Monitor BMP  - Avoid nephrotoxic medications: hold home torsemide, spironolactone, and rampiril  - Monitor renal indices  - Nephro Dr. Harper following, f/u recs

## 2023-08-22 NOTE — GOALS OF CARE CONVERSATION - ADVANCED CARE PLANNING - CONVERSATION DETAILS
Palliative care team met with patient/family at bedside. Patient is Mele speaking and refused translation services. Son provided translation. Reviewed patient's medical and social history as well as events leading to patient's hospitalization. Writer discussed patient's current diagnosis (HCC, liver cirrhosis,  s/p esophageal varices banding, hyponatremia, sepsis, HX of situs inversus, bradycardia, HTN, HLD, T2DM, seizure, GERD), medical condition and management,  prognosis, and life expectancy. Inquired about advanced directives. Patient keyes snot have any in place. He resides in [facility] and is in [facility]. He stated that he would want his wife to make any medical decisions for him if he could not do so. Inquired about patient's wishes regarding extent of medical care to be provided including escalation of medical care into the ICU and use of vasopressor support. In addition, the writer inquired about thoughts regarding cardiopulmonary resuscitation, artificial nutrition and hydration including use of feeding tubes and IVF, antibiotics, and further investigative studies such as blood draws and radiology. Patient/family showed insight into medical condition. They want for patient to remain a full code at this time.  All questions answered. Psychosocial support provided.

## 2023-08-22 NOTE — PROGRESS NOTE ADULT - PROBLEM SELECTOR PLAN 2
- CT A/P: Highly suspicious for primary HCC versus metastasis. Peritoneal carcinomatosis and moderate volume ascites.  - Paracentesis today with IR as per GI  - F/u AFP   - F/u Protein/Cr ratio  - Heme/Onc Dr. Winter group following, f/u recs  - GI Dr. Devi following, f/u recs - CT A/P: Highly suspicious for primary HCC versus metastasis. Peritoneal carcinomatosis and moderate volume ascites.  - Paracentesis tomorrow with IR as per GI  - F/u AFP   - F/u Protein/Cr ratio  - Heme/Onc Dr. Winter group following, f/u recs  - GI Dr. Devi following, f/u recs - CT A/P: Highly suspicious for primary HCC versus metastasis. Peritoneal carcinomatosis and moderate volume ascites.  - F/u AFP   - F/u Protein/Cr ratio  - Heme/Onc Dr. Winter group following, f/u recs  - GI Dr. Devi following, f/u recs - CT A/P: Highly suspicious for primary HCC versus metastasis. Peritoneal carcinomatosis and moderate volume ascites.  - AFP 4.2 WNL    - F/u Protein/Cr ratio  - Heme/Onc Dr. Winter group following, f/u recs  - GI Dr. Devi following, f/u recs - CT A/P: Highly suspicious for primary HCC versus metastasis. Peritoneal carcinomatosis and moderate volume ascites.  - AFP 4.2 WNL    - Protein/Cr ratio - WNL  - Heme/Onc Dr. Winter group following - may still need tissue bx for diagnosis and NGS markers studies for potential targeted therapy  - GI Dr. Devi following, f/u recs

## 2023-08-22 NOTE — CONSULT NOTE ADULT - SUBJECTIVE AND OBJECTIVE BOX
Patient is a 68y old  Male who presents with a chief complaint of Liver Cirrhosis with ascites, Liver cancer, hyponatremia (22 Aug 2023 11:15)      HPI:  69 y/o Mele-speaking, refused , Son Ang translated at bedside. Patient has history of liver cirrhosis s/p esophageal varices banding, situs inversus, HF?, bradycardia, HTN, HLD, T2DM, seizure, GERD presents to ED due to subjective fever for the past 4-5 days associated with weakness, malaise, poor appetite and abdominal bloating, symptoms kept persistently worsening and yesterday he started to have LUQ pain, describes pain as dull/cramping in nature, intermittent, no radiation, Pt also notes one episode of bloody stool with a little bright red blood 2 days ago. Patient lives in [facility] [place] to [place] to [place] family, full independent in ADL's, denies recent unintentional weight loss, nausea, vomiting, urinary symptoms, or other symptoms at this time     ED course:  Vitals: BP: 87/60-> 113/92, HR: 96, Temp: 97.9, RR: 18, SpO2: 98% on RA  Labs significant for: WBC 14.02, Hgb 12.9, Plt 471, Na 122, Cl 93, BUN 25, Cr 1.8, Glu 450, Alb 2.1, AST 51, eGFR 41, Lactate 3  UA: +glu >1000  CT a/p:  Total situs inversus. Cirrhosis with a dominant liver mass concerning for primary HCC versus metastasis. Peritoneal carcinomatosis and moderate volume ascites.  In ED given Rocephin 1g x1, Humulin 5u x1, NS bolus 1L x2 (21 Aug 2023 18:33)      PAST MEDICAL & SURGICAL HISTORY:  T2DM (type 2 diabetes mellitus)      History of seizure      History of bradycardia      GERD (gastroesophageal reflux disease)      History of cirrhosis of liver      HLD (hyperlipidemia)      HTN (hypertension)      H/O esophageal varices                ECHO  FINDINGS:      MEDICATIONS  (STANDING):  carvedilol 3.125 milliGRAM(s) Oral every 12 hours  cefTRIAXone   IVPB 2000 milliGRAM(s) IV Intermittent every 24 hours  dextrose 5%. 1000 milliLiter(s) (50 mL/Hr) IV Continuous <Continuous>  dextrose 5%. 1000 milliLiter(s) (100 mL/Hr) IV Continuous <Continuous>  dextrose 50% Injectable 12.5 Gram(s) IV Push once  dextrose 50% Injectable 25 Gram(s) IV Push once  dextrose 50% Injectable 25 Gram(s) IV Push once  digoxin     Tablet 125 MICROGram(s) Oral <User Schedule>  glucagon  Injectable 1 milliGRAM(s) IntraMuscular once  heparin   Injectable 5000 Unit(s) SubCutaneous every 8 hours  insulin glargine Injectable (LANTUS) 5 Unit(s) SubCutaneous every morning  insulin lispro (ADMELOG) corrective regimen sliding scale   SubCutaneous three times a day before meals  insulin lispro (ADMELOG) corrective regimen sliding scale   SubCutaneous at bedtime  insulin lispro Injectable (ADMELOG) 3 Unit(s) SubCutaneous before breakfast  insulin lispro Injectable (ADMELOG) 3 Unit(s) SubCutaneous before dinner  insulin lispro Injectable (ADMELOG) 3 Unit(s) SubCutaneous before lunch  phenytoin   Capsule 100 milliGRAM(s) Oral daily  rifAXIMin 550 milliGRAM(s) Oral two times a day    MEDICATIONS  (PRN):  albuterol/ipratropium for Nebulization 3 milliLiter(s) Nebulizer every 6 hours PRN Shortness of Breath and/or Wheezing  melatonin 3 milliGRAM(s) Oral at bedtime PRN Insomnia      FAMILY HISTORY:  FH: type 2 diabetes (Mother)      Denies Family history of CAD or early MI    ROS:  Constitutional: denies fever, chills  HEENT: denies blurry vision, difficulty hearing  Respiratory: denies SOB, PTEERSON, cough  Cardiovascular: denies CP, palpitations, orthopnea, PND, LE edema  Gastrointestinal: denies nausea, vomiting, abdominal pain  Genitourinary: denies urinary changes  Skin: Denies rashes, itching  Neurologic: denies headache, weakness, dizziness  Hematology/Oncology: denies bleeding, easy bruising  ROS negative except as noted above      SOCIAL HISTORY:    No tobacco, Alcohol or Drug use    Vital Signs Last 24 Hrs  T(C): 36.9 (22 Aug 2023 11:58), Max: 36.9 (22 Aug 2023 11:58)  T(F): 98.5 (22 Aug 2023 11:58), Max: 98.5 (22 Aug 2023 11:58)  HR: 109 (22 Aug 2023 11:58) (93 - 109)  BP: 107/74 (22 Aug 2023 11:58) (87/60 - 113/69)  BP(mean): --  RR: 18 (22 Aug 2023 11:58) (18 - 20)  SpO2: 93% (22 Aug 2023 11:58) (93% - 99%)    Parameters below as of 22 Aug 2023 11:58  Patient On (Oxygen Delivery Method): room air        Physical Exam:  General: Well developed, well nourished, NAD  HEENT: NCAT, moist mucous membranes   Neurology: A&Ox3, nonfocal, sensation intact   Respiratory: CTA B/L, No W/R/R  CV: RRR, +S1/S2, no murmurs, rubs or gallops  Abdominal: Soft, NT, ND +BS, no palpable masses  Extremities: No LE edema or calf tenderness  MSK: Normal ROM, no joint erythema or warmth, no joint swelling   Heme: No obvious ecchymosis or petechiae   Skin: warm, dry, normal color      ECG:    I&O's Detail      LABS:                        12.9   14.06 )-----------( 459      ( 22 Aug 2023 07:33 )             38.3     08-22    128<L>  |  100  |  26<H>  ----------------------------<  150<H>  4.8   |  22  |  1.40<H>    Ca    8.1<L>      22 Aug 2023 07:33    TPro  7.4  /  Alb  2.1<L>  /  TBili  0.6  /  DBili  x   /  AST  51<H>  /  ALT  32  /  AlkPhos  110  08-22        PT/INR - ( 22 Aug 2023 07:33 )   PT: 12.9 sec;   INR: 1.11 ratio         PTT - ( 22 Aug 2023 07:33 )  PTT:26.8 sec  Urinalysis Basic - ( 22 Aug 2023 07:33 )    Color: x / Appearance: x / SG: x / pH: x  Gluc: 150 mg/dL / Ketone: x  / Bili: x / Urobili: x   Blood: x / Protein: x / Nitrite: x   Leuk Esterase: x / RBC: x / WBC x   Sq Epi: x / Non Sq Epi: x / Bacteria: x      I&O's Summary    BNP  RADIOLOGY & ADDITIONAL STUDIES: Patient is a 68y old male who presents with a chief complaint of Liver Cirrhosis with ascites, Liver cancer, hyponatremia (22 Aug 2023 11:15)      HPI:  67 y/o Mele-speaking, refused , Son Ang translated at bedside. Patient has history of liver cirrhosis s/p esophageal varices banding, situs inversus, HF?, bradycardia, HTN, HLD, T2DM, seizure, GERD presents to ED due to subjective fever for the past 4-5 days associated with weakness, malaise, poor appetite and abdominal bloating, symptoms kept persistently worsening and yesterday he started to have LUQ pain, describes pain as dull/cramping in nature, intermittent, no radiation, Pt also notes one episode of bloody stool with a little bright red blood 2 days ago. Patient lives in [facility] came to [place] only to visit family, full independent in ADL's, denies recent unintentional weight loss, nausea, vomiting, urinary symptoms, or other symptoms at this time.       ED course:  Vitals: BP: 87/60-> 113/92, HR: 96, Temp: 97.9, RR: 18, SpO2: 98% on RA  Labs significant for: WBC 14.02, Hgb 12.9, Plt 471, Na 122, Cl 93, BUN 25, Cr 1.8, Glu 450, Alb 2.1, AST 51, eGFR 41, Lactate 3  UA: +glu >1000  CT a/p:  Total situs inversus. Cirrhosis with a dominant liver mass concerning for primary HCC versus metastasis. Peritoneal carcinomatosis and moderate volume ascites.  In ED given Rocephin 1g x1, Humulin 5u x1, NS bolus 1L x2 (21 Aug 2023 18:33)      Interval Hx: Patient was examined at bedside and he was alert, oriented, and had no acute complaints. He was admitted for liver cirrhosis with ascites complicated with sepsis possibly secondary to SBP and electrolyte imbalance/ hyponatremia. Patient has a hx of situs inversus and CHF with last echo done in [place] in  showing LVEF 25-30%. Patient denies any CP, SOB, dyspnea, orthopnea.        PAST MEDICAL & SURGICAL HISTORY:  T2DM (type 2 diabetes mellitus)      History of seizure      History of bradycardia      GERD (gastroesophageal reflux disease)      History of cirrhosis of liver      HLD (hyperlipidemia)      HTN (hypertension)      H/O esophageal varices                ECHO  FINDINGS:  Results not available     MEDICATIONS  (STANDING):  carvedilol 3.125 milliGRAM(s) Oral every 12 hours  cefTRIAXone   IVPB 2000 milliGRAM(s) IV Intermittent every 24 hours  dextrose 5%. 1000 milliLiter(s) (50 mL/Hr) IV Continuous <Continuous>  dextrose 5%. 1000 milliLiter(s) (100 mL/Hr) IV Continuous <Continuous>  dextrose 50% Injectable 12.5 Gram(s) IV Push once  dextrose 50% Injectable 25 Gram(s) IV Push once  dextrose 50% Injectable 25 Gram(s) IV Push once  digoxin     Tablet 125 MICROGram(s) Oral <User Schedule>  glucagon  Injectable 1 milliGRAM(s) IntraMuscular once  heparin   Injectable 5000 Unit(s) SubCutaneous every 8 hours  insulin glargine Injectable (LANTUS) 5 Unit(s) SubCutaneous every morning  insulin lispro (ADMELOG) corrective regimen sliding scale   SubCutaneous three times a day before meals  insulin lispro (ADMELOG) corrective regimen sliding scale   SubCutaneous at bedtime  insulin lispro Injectable (ADMELOG) 3 Unit(s) SubCutaneous before breakfast  insulin lispro Injectable (ADMELOG) 3 Unit(s) SubCutaneous before dinner  insulin lispro Injectable (ADMELOG) 3 Unit(s) SubCutaneous before lunch  phenytoin   Capsule 100 milliGRAM(s) Oral daily  rifAXIMin 550 milliGRAM(s) Oral two times a day    MEDICATIONS  (PRN):  albuterol/ipratropium for Nebulization 3 milliLiter(s) Nebulizer every 6 hours PRN Shortness of Breath and/or Wheezing  melatonin 3 milliGRAM(s) Oral at bedtime PRN Insomnia      FAMILY HISTORY:  FH: type 2 diabetes (Mother)      Denies Family history of CAD or early MI    ROS:  Constitutional: denies fever, chills  HEENT: denies blurry vision  Respiratory: denies SOB, PETERSON, cough  Cardiovascular: denies CP, palpitations, orthopnea, PND, LE edema  Gastrointestinal: denies nausea, vomiting, abdominal pain  Neurologic: denies headache, weakness, dizziness  Hematology/Oncology: denies bleeding, easy bruising  ROS negative except as noted above      SOCIAL HISTORY:    No tobacco, Alcohol or Drug use  Quit tobacco and alcohol 20 yrs ago    Vital Signs Last 24 Hrs  T(C): 36.9 (22 Aug 2023 11:58), Max: 36.9 (22 Aug 2023 11:58)  T(F): 98.5 (22 Aug 2023 11:58), Max: 98.5 (22 Aug 2023 11:58)  HR: 109 (22 Aug 2023 11:58) (93 - 109)  BP: 107/74 (22 Aug 2023 11:58) (87/60 - 113/69)  BP(mean): --  RR: 18 (22 Aug 2023 11:58) (18 - 20)  SpO2: 93% (22 Aug 2023 11:58) (93% - 99%)    Parameters below as of 22 Aug 2023 11:58  Patient On (Oxygen Delivery Method): room air        Physical Exam:  General: alert, oriented, NAD  HEENT: NCAT, moist mucous membranes   Neurology: A&Ox3, nonfocal, sensation intact   Respiratory: decreased breath sounds B/L, No W/R/R  CV: RRR, +S1/S2, no murmurs, rubs or gallops  Abdominal: Soft, NT, ND, no palpable masses  Extremities: No LE edema or calf tenderness  MSK: no joint erythema or warmth, no joint swelling   Heme: No obvious ecchymosis or petechiae   Skin: warm, dry, normal color      EC/22 - Sinus tachycardia w/ , Inferior infarct, Anterolateral infarct    I&O's Detail      LABS:                        12.9   14.06 )-----------( 459      ( 22 Aug 2023 07:33 )             38.3     08    128<L>  |  100  |  26<H>  ----------------------------<  150<H>  4.8   |  22  |  1.40<H>    Ca    8.1<L>      22 Aug 2023 07:33    TPro  7.4  /  Alb  2.1<L>  /  TBili  0.6  /  DBili  x   /  AST  51<H>  /  ALT  32  /  AlkPhos  110  08-        PT/INR - ( 22 Aug 2023 07:33 )   PT: 12.9 sec;   INR: 1.11 ratio         PTT - ( 22 Aug 2023 07:33 )  PTT:26.8 sec  Urinalysis Basic - ( 22 Aug 2023 07:33 )    Color: x / Appearance: x / SG: x / pH: x  Gluc: 150 mg/dL / Ketone: x  / Bili: x / Urobili: x   Blood: x / Protein: x / Nitrite: x   Leuk Esterase: x / RBC: x / WBC x   Sq Epi: x / Non Sq Epi: x / Bacteria: x      I&O's Summary    BNP  RADIOLOGY & ADDITIONAL STUDIES:  < from: CT Abdomen and Pelvis w/ IV Cont (23 @ 17:48) >    ACC: [id number] EXAM:  CT ABDOMEN AND PELVIS IC   ORDERED BY: IRIS REAVES     PROCEDURE DATE:  2023          INTERPRETATION:  CLINICAL INFORMATION: Left flank pain.    COMPARISON: None.    CONTRAST/COMPLICATIONS:  IV Contrast: Omnipaque 350  90 cc administered   10 cc discarded  Oral Contrast: NONE  Complications: None reported at time of study completion    PROCEDURE:  CT of the Abdomen and Pelvis was performed.  Sagittal and coronal reformats were performed.    FINDINGS:  LOWER CHEST: Total situs inversus.    LIVER: Cirrhosis. A segment 8/4 A exophytic peripherally enhancing   low-density mass extends superiorly to the diaphragm, measuring   approximately 5.2 x 4.1 cm. Additional 5 cm cyst low-density lesions that   are too small to characterize.  BILE DUCTS: Normal caliber.  GALLBLADDER: Within normal limits.  SPLEEN: Within normal limits.  PANCREAS: Within normal limits.  ADRENALS: Within normal limits.  KIDNEYS/URETERS: Within normal limits.    BLADDER: Within normal limits.  REPRODUCTIVE ORGANS: Mildly prominent prostate gland.    BOWEL: No bowel obstruction.  PERITONEUM: Moderate volume ascites with soft tissue implants diffusely   along the peritoneum as well as the serosal surface of the sigmoid colon.   A perisigmoid implant measures approximately 3.9 x 2.8 cm in greatest   transaxial dimension. Soft tissue implants are noted along the falciform   ligament. Omental nodularity is present.  VESSELS: Patent portal, superior mesenteric, and splenic veins. Diffuse   abdominal collaterals..  RETROPERITONEUM/LYMPH NODES: No lymphadenopathy.  ABDOMINAL WALL: Left inguinal hernia containing fluid and fat.  BONES: Degenerative changes.    IMPRESSION: Total situs inversus.  Cirrhosis with a dominant liver mass concerning for primary HCC versus   metastasis.    Peritoneal carcinomatosis and moderate volume ascites.        --- End of Report ---            RASHAUN BARRIENTOS MD; Attending Radiologist  This document has been electronically signed. Aug 21 2023  6:07PM    < end of copied text >

## 2023-08-22 NOTE — PROGRESS NOTE ADULT - PROBLEM SELECTOR PLAN 7
Chronic, non- insulin dependent   - In ED: Glu 450 on admission, downtrended to 269 s/p Humulin 5U  - Hold home oral meds  - Moderate dose insulin corrective scale  - Continue Lispro 3 units pre-meals and Lantus 5 AM   - Endo Dr. Perlman consulted, f/u recs   - Hypoglycemia protocol, fingerstick glucose QAC&HS   - F/u AM HbA1c Chronic, non- insulin dependent   - In ED: Glu 450 on admission, downtrended to 269 s/p Humulin 5U  - Hgb A1C 9.4%  - Hold home oral meds  - Moderate dose insulin corrective scale  - Continue Lispro 3 units pre-meals and Lantus 5 AM   - Endo Dr. Perlman consulted, f/u recs   - Hypoglycemia protocol, fingerstick glucose QAC&HS

## 2023-08-22 NOTE — CONSULT NOTE ADULT - NS ATTEND AMEND GEN_ALL_CORE FT
reported systolic HF, with presentation for weakness, hyponatremia, liver cirrhosis with ascites, with concern for malignancy/HCC  no sign of acute ischemia or volume overload  also interesting history of situs inversus  please repeat ekg with all leads flipped  hold diuretics  check digoxin level  echocardiogram to evaluate lv function  did have some nsvt on telemetry, cont to monitor, cont bb, and replete electrolytes.  dw patient and family in detail

## 2023-08-22 NOTE — CONSULT NOTE ADULT - SUBJECTIVE AND OBJECTIVE BOX
Date/Time Patient Seen:  		  Referring MD:   Data Reviewed	       Patient is a 68y old  Male who presents with a chief complaint of Liver Cirrhosis with ascites, Liver cancer, hyponatremia (21 Aug 2023 18:33)      Subjective/HPI   69 y/o Mele-speaking, refused , Son Ang translated at bedside. Patient has history of liver cirrhosis s/p esophageal varices banding, situs inversus, HF?, bradycardia, HTN, HLD, T2DM, seizure, GERD presents to ED due to subjective fever for the past 4-5 days associated with weakness, malaise, poor appetite and abdominal bloating, symptoms kept persistently worsening and yesterday he started to have LUQ pain, describes pain as dull/cramping in nature, intermittent, no radiation, Pt also notes one episode of bloody stool with a little bright red blood 2 days ago. Patient lives in [facility] came to [place] to [place] family, full independent in ADL's, denies recent unintentional weight loss, nausea, vomiting, urinary symptoms, or other symptoms at this time     PAST MEDICAL & SURGICAL HISTORY:  T2DM (type 2 diabetes mellitus)    History of seizure    History of bradycardia    GERD (gastroesophageal reflux disease)    History of cirrhosis of liver    HLD (hyperlipidemia)    HTN (hypertension)    H/O esophageal varices      FAMILY HISTORY:  Mother  Still living? Unknown  FH: type 2 diabetes, Age at diagnosis: Age Unknown.     Social History:  · Substance use	No  · Social History (marital status, living situation, occupation, and sexual history)	Tobacco: former social smoker, quit 25 years ago  EtOH: former recreational alcohol drinker, quit 25 years ago  Recreational drug use: denies  Lives with: patient from [facility], currently visiting family in [place]  Ambulates: independent  ADLs: independent     Tobacco Screening:  · Core Measure Site	Yes  · Has the patient used tobacco in the past 30 days?	No    Risk Assessment:    Present on Admission:  Deep Venous Thrombosis	no  Pulmonary Embolus	no     HIV Screening:  · In accordance with [place] State law, we offer every patient who comes to our ED an HIV test. Would you like to be tested today?	Opt out        Medication list         MEDICATIONS  (STANDING):  carvedilol 3.125 milliGRAM(s) Oral every 12 hours  cefTRIAXone   IVPB 2000 milliGRAM(s) IV Intermittent every 24 hours  dextrose 5%. 1000 milliLiter(s) (50 mL/Hr) IV Continuous <Continuous>  dextrose 5%. 1000 milliLiter(s) (100 mL/Hr) IV Continuous <Continuous>  dextrose 50% Injectable 25 Gram(s) IV Push once  dextrose 50% Injectable 12.5 Gram(s) IV Push once  dextrose 50% Injectable 25 Gram(s) IV Push once  digoxin     Tablet 125 MICROGram(s) Oral <User Schedule>  glucagon  Injectable 1 milliGRAM(s) IntraMuscular once  insulin glargine Injectable (LANTUS) 5 Unit(s) SubCutaneous every morning  insulin lispro (ADMELOG) corrective regimen sliding scale   SubCutaneous three times a day before meals  insulin lispro (ADMELOG) corrective regimen sliding scale   SubCutaneous at bedtime  insulin lispro Injectable (ADMELOG) 3 Unit(s) SubCutaneous before breakfast  insulin lispro Injectable (ADMELOG) 3 Unit(s) SubCutaneous before dinner  insulin lispro Injectable (ADMELOG) 3 Unit(s) SubCutaneous before lunch  phenytoin   Capsule 100 milliGRAM(s) Oral daily    MEDICATIONS  (PRN):  albuterol    90 MICROgram(s) HFA Inhaler 2 Puff(s) Inhalation every 6 hours PRN Shortness of Breath and/or Wheezing  albuterol/ipratropium for Nebulization 3 milliLiter(s) Nebulizer every 6 hours PRN Shortness of Breath and/or Wheezing  melatonin 3 milliGRAM(s) Oral at bedtime PRN Insomnia         Vitals log        ICU Vital Signs Last 24 Hrs  T(C): 36.7 (22 Aug 2023 01:37), Max: 36.7 (21 Aug 2023 18:00)  T(F): 98 (22 Aug 2023 01:37), Max: 98 (21 Aug 2023 18:00)  HR: 98 (22 Aug 2023 03:18) (93 - 98)  BP: 110/71 (22 Aug 2023 03:18) (87/60 - 113/69)  BP(mean): --  ABP: --  ABP(mean): --  RR: 19 (22 Aug 2023 03:18) (18 - 20)  SpO2: 96% (22 Aug 2023 03:18) (96% - 98%)    O2 Parameters below as of 21 Aug 2023 23:45  Patient On (Oxygen Delivery Method): room air                 Input and Output:  I&O's Detail      Lab Data                        12.9   14.02 )-----------( 471      ( 21 Aug 2023 17:00 )             39.8     08-22    130<L>  |  100  |  25<H>  ----------------------------<  195<H>  4.7   |  24  |  1.50<H>    Ca    7.7<L>      22 Aug 2023 03:37    TPro  7.8  /  Alb  2.1<L>  /  TBili  0.8  /  DBili  x   /  AST  51<H>  /  ALT  30  /  AlkPhos  120  08-21            Review of Systems	      Objective     Physical Examination        Pertinent Lab findings & Imaging      Zaman:  NO   Adequate UO     I&O's Detail           Discussed with:     Cultures:	        Radiology        ACC: [id number] EXAM:  CT ABDOMEN AND PELVIS IC   ORDERED BY: IRIS REAVES     PROCEDURE DATE:  08/21/2023          INTERPRETATION:  CLINICAL INFORMATION: Left flank pain.    COMPARISON: None.    CONTRAST/COMPLICATIONS:  IV Contrast: Omnipaque 350  90 cc administered   10 cc discarded  Oral Contrast: NONE  Complications: None reported at time of study completion    PROCEDURE:  CT of the Abdomen and Pelvis was performed.  Sagittal and coronal reformats were performed.    FINDINGS:  LOWER CHEST: Total situs inversus.    LIVER: Cirrhosis. A segment 8/4 A exophytic peripherally enhancing   low-density mass extends superiorly to the diaphragm, measuring   approximately 5.2 x 4.1 cm. Additional 5 cm cyst low-density lesions that   are too small to characterize.  BILE DUCTS: Normal caliber.  GALLBLADDER: Within normal limits.  SPLEEN: Within normal limits.  PANCREAS: Within normal limits.  ADRENALS: Within normal limits.  KIDNEYS/URETERS: Within normal limits.    BLADDER: Within normal limits.  REPRODUCTIVE ORGANS: Mildly prominent prostate gland.    BOWEL: No bowel obstruction.  PERITONEUM: Moderate volume ascites with soft tissue implants diffusely   along the peritoneum as well as the serosal surface of the sigmoid colon.   A perisigmoid implant measures approximately 3.9 x 2.8 cm in greatest   transaxial dimension. Soft tissue implants are noted along the falciform   ligament. Omental nodularity is present.  VESSELS: Patent portal, superior mesenteric, and splenic veins. Diffuse   abdominal collaterals..  RETROPERITONEUM/LYMPH NODES: No lymphadenopathy.  ABDOMINAL WALL: Left inguinal hernia containing fluid and fat.  BONES: Degenerative changes.    IMPRESSION: Total situs inversus.  Cirrhosis with a dominant liver mass concerning for primary HCC versus   metastasis.    Peritoneal carcinomatosis and moderate volume ascites.        --- End of Report ---            RASHAUN BARRIENTOS MD; Attending Radiologist  This document has been electronically signed. Aug 21 2023  6:07PM                       Date/Time Patient Seen:  		  Referring MD:   Data Reviewed	       Patient is a 68y old  Male who presents with a chief complaint of Liver Cirrhosis with ascites, Liver cancer, hyponatremia (21 Aug 2023 18:33)      Subjective/HPI  in bed  seen and examined  vs noted  labs reviewed  h and p reviewed  er provider note reviewed  on RA     69 y/o Mele-speaking, refused , Willy Fry translated at bedside. Patient has history of liver cirrhosis s/p esophageal varices banding, situs inversus, HF?, bradycardia, HTN, HLD, T2DM, seizure, GERD presents to ED due to subjective fever for the past 4-5 days associated with weakness, malaise, poor appetite and abdominal bloating, symptoms kept persistently worsening and yesterday he started to have LUQ pain, describes pain as dull/cramping in nature, intermittent, no radiation, Pt also notes one episode of bloody stool with a little bright red blood 2 days ago. Patient lives in [facility] came to [place] to [place] family, full independent in ADL's, denies recent unintentional weight loss, nausea, vomiting, urinary symptoms, or other symptoms at this time     PAST MEDICAL & SURGICAL HISTORY:  T2DM (type 2 diabetes mellitus)    History of seizure    History of bradycardia    GERD (gastroesophageal reflux disease)    History of cirrhosis of liver    HLD (hyperlipidemia)    HTN (hypertension)    H/O esophageal varices      FAMILY HISTORY:  Mother  Still living? Unknown  FH: type 2 diabetes, Age at diagnosis: Age Unknown.     Social History:  · Substance use	No  · Social History (marital status, living situation, occupation, and sexual history)	Tobacco: former social smoker, quit 25 years ago  EtOH: former recreational alcohol drinker, quit 25 years ago  Recreational drug use: denies  Lives with: patient from [place], currently visiting family in [place]  Ambulates: independent  ADLs: independent     Tobacco Screening:  · Core Measure Site	Yes  · Has the patient used tobacco in the past 30 days?	No    Risk Assessment:    Present on Admission:  Deep Venous Thrombosis	no  Pulmonary Embolus	no     HIV Screening:  · In accordance with [place] State law, we offer every patient who comes to our ED an HIV test. Would you like to be tested today?	Opt out        Medication list         MEDICATIONS  (STANDING):  carvedilol 3.125 milliGRAM(s) Oral every 12 hours  cefTRIAXone   IVPB 2000 milliGRAM(s) IV Intermittent every 24 hours  dextrose 5%. 1000 milliLiter(s) (50 mL/Hr) IV Continuous <Continuous>  dextrose 5%. 1000 milliLiter(s) (100 mL/Hr) IV Continuous <Continuous>  dextrose 50% Injectable 25 Gram(s) IV Push once  dextrose 50% Injectable 12.5 Gram(s) IV Push once  dextrose 50% Injectable 25 Gram(s) IV Push once  digoxin     Tablet 125 MICROGram(s) Oral <User Schedule>  glucagon  Injectable 1 milliGRAM(s) IntraMuscular once  insulin glargine Injectable (LANTUS) 5 Unit(s) SubCutaneous every morning  insulin lispro (ADMELOG) corrective regimen sliding scale   SubCutaneous three times a day before meals  insulin lispro (ADMELOG) corrective regimen sliding scale   SubCutaneous at bedtime  insulin lispro Injectable (ADMELOG) 3 Unit(s) SubCutaneous before breakfast  insulin lispro Injectable (ADMELOG) 3 Unit(s) SubCutaneous before dinner  insulin lispro Injectable (ADMELOG) 3 Unit(s) SubCutaneous before lunch  phenytoin   Capsule 100 milliGRAM(s) Oral daily    MEDICATIONS  (PRN):  albuterol    90 MICROgram(s) HFA Inhaler 2 Puff(s) Inhalation every 6 hours PRN Shortness of Breath and/or Wheezing  albuterol/ipratropium for Nebulization 3 milliLiter(s) Nebulizer every 6 hours PRN Shortness of Breath and/or Wheezing  melatonin 3 milliGRAM(s) Oral at bedtime PRN Insomnia         Vitals log        ICU Vital Signs Last 24 Hrs  T(C): 36.7 (22 Aug 2023 01:37), Max: 36.7 (21 Aug 2023 18:00)  T(F): 98 (22 Aug 2023 01:37), Max: 98 (21 Aug 2023 18:00)  HR: 98 (22 Aug 2023 03:18) (93 - 98)  BP: 110/71 (22 Aug 2023 03:18) (87/60 - 113/69)  BP(mean): --  ABP: --  ABP(mean): --  RR: 19 (22 Aug 2023 03:18) (18 - 20)  SpO2: 96% (22 Aug 2023 03:18) (96% - 98%)    O2 Parameters below as of 21 Aug 2023 23:45  Patient On (Oxygen Delivery Method): room air                 Input and Output:  I&O's Detail      Lab Data                        12.9   14.02 )-----------( 471      ( 21 Aug 2023 17:00 )             39.8     08-22    130<L>  |  100  |  25<H>  ----------------------------<  195<H>  4.7   |  24  |  1.50<H>    Ca    7.7<L>      22 Aug 2023 03:37    TPro  7.8  /  Alb  2.1<L>  /  TBili  0.8  /  DBili  x   /  AST  51<H>  /  ALT  30  /  AlkPhos  120  08-21            Review of Systems	  weakness  dec PO intake      Objective     Physical Examination  on RA  head nc  head at  heart 1s2  lung dc bS        Pertinent Lab findings & Imaging      Zaman:  NO   Adequate UO     I&O's Detail           Discussed with:     Cultures:	        Radiology        ACC: [id number] EXAM:  CT ABDOMEN AND PELVIS IC   ORDERED BY: IRIS REAVES     PROCEDURE DATE:  08/21/2023          INTERPRETATION:  CLINICAL INFORMATION: Left flank pain.    COMPARISON: None.    CONTRAST/COMPLICATIONS:  IV Contrast: Omnipaque 350  90 cc administered   10 cc discarded  Oral Contrast: NONE  Complications: None reported at time of study completion    PROCEDURE:  CT of the Abdomen and Pelvis was performed.  Sagittal and coronal reformats were performed.    FINDINGS:  LOWER CHEST: Total situs inversus.    LIVER: Cirrhosis. A segment 8/4 A exophytic peripherally enhancing   low-density mass extends superiorly to the diaphragm, measuring   approximately 5.2 x 4.1 cm. Additional 5 cm cyst low-density lesions that   are too small to characterize.  BILE DUCTS: Normal caliber.  GALLBLADDER: Within normal limits.  SPLEEN: Within normal limits.  PANCREAS: Within normal limits.  ADRENALS: Within normal limits.  KIDNEYS/URETERS: Within normal limits.    BLADDER: Within normal limits.  REPRODUCTIVE ORGANS: Mildly prominent prostate gland.    BOWEL: No bowel obstruction.  PERITONEUM: Moderate volume ascites with soft tissue implants diffusely   along the peritoneum as well as the serosal surface of the sigmoid colon.   A perisigmoid implant measures approximately 3.9 x 2.8 cm in greatest   transaxial dimension. Soft tissue implants are noted along the falciform   ligament. Omental nodularity is present.  VESSELS: Patent portal, superior mesenteric, and splenic veins. Diffuse   abdominal collaterals..  RETROPERITONEUM/LYMPH NODES: No lymphadenopathy.  ABDOMINAL WALL: Left inguinal hernia containing fluid and fat.  BONES: Degenerative changes.    IMPRESSION: Total situs inversus.  Cirrhosis with a dominant liver mass concerning for primary HCC versus   metastasis.    Peritoneal carcinomatosis and moderate volume ascites.        --- End of Report ---            RASHAUN BARRIENTOS MD; Attending Radiologist  This document has been electronically signed. Aug 21 2023  6:07PM

## 2023-08-22 NOTE — PROGRESS NOTE ADULT - PROBLEM SELECTOR PLAN 4
On admission patient met sepsis criteria due to hypotension, leukocytosis, tachy and elevated lactate. Unclear source but will cover for SBP  - S/p 2L NS bolus and Rocephin in ED  - Continue Rocephin 2g q24hm until cultures come back and ID recs  - Lactate 3 on admission --> repeat lactate WNL today  - Trend WBC and monitor for fever  - F/u BCx x2  - ID Dr. Allred consulted, f/u recs On admission patient met sepsis criteria due to hypotension, leukocytosis, tachy and elevated lactate. Unclear source but will cover for SBP  - S/p 2L NS bolus and Rocephin in ED  - Continue Rocephin 2g q24hm to cover SBP as per ID   - Lactate 3 on admission --> repeat lactate WNL today  - Trend WBC and monitor for fever  - F/u BCx x2  - ID Dr. Allred consulted, f/u recs

## 2023-08-22 NOTE — CONSULT NOTE ADULT - SUBJECTIVE AND OBJECTIVE BOX
All records reviewed.  seen w son and wife, son actng as interpretor    HPI:  69 y/o man visiting from [place], hx cirrhosis post esophageal varices banding 2020 2021, situs inversus, HF?, bradycardia, HTN, HLD, T2DM, seizure, GERD  Came to ER c/o 4-5 d weakness, malaise, LUQ abdominal pain, abdomen bloating  ED course:  Vitals: BP: 87/60-> 113/92, HR: 96, Temp: 97.9, RR: 18, SpO2: 98% on RA  Labs significant for: WBC 14.02, Hgb 12.9, Plt 471, Na 122, Cl 93, BUN 25, Cr 1.8, Glu 450, Alb 2.1, AST 51, eGFR 41, Lactate 3  UA: +glu >1000  CT a/p:  Total situs inversus. Cirrhosis w exophytic luis enhancing low density mass extending to diaphragm 5.2x4.1cm, add 5cm cyst, low density lesions too small to renetta. Mod ascites w abd carcinomatosis and implants noted up to 3.9cm    wife denies ever had imaging of abdomen done, reports first endoscopy was done due to "ulcer bleed"  dc Etoh and tobaco 1999  no night sweats fever anorecia weight loss, luis edema      PAST MEDICAL & SURGICAL HISTORY:  T2DM (type 2 diabetes mellitus)      History of seizure      History of bradycardia      GERD (gastroesophageal reflux disease)      History of cirrhosis of liver      HLD (hyperlipidemia)      HTN (hypertension)      H/O esophageal varices          Review of System:  see above    MEDICATIONS  (STANDING):  carvedilol 3.125 milliGRAM(s) Oral every 12 hours  cefTRIAXone   IVPB 2000 milliGRAM(s) IV Intermittent every 24 hours  dextrose 5%. 1000 milliLiter(s) (50 mL/Hr) IV Continuous <Continuous>  dextrose 5%. 1000 milliLiter(s) (100 mL/Hr) IV Continuous <Continuous>  dextrose 50% Injectable 25 Gram(s) IV Push once  dextrose 50% Injectable 12.5 Gram(s) IV Push once  dextrose 50% Injectable 25 Gram(s) IV Push once  digoxin     Tablet 125 MICROGram(s) Oral <User Schedule>  glucagon  Injectable 1 milliGRAM(s) IntraMuscular once  insulin glargine Injectable (LANTUS) 5 Unit(s) SubCutaneous every morning  insulin lispro (ADMELOG) corrective regimen sliding scale   SubCutaneous three times a day before meals  insulin lispro (ADMELOG) corrective regimen sliding scale   SubCutaneous at bedtime  insulin lispro Injectable (ADMELOG) 3 Unit(s) SubCutaneous before breakfast  insulin lispro Injectable (ADMELOG) 3 Unit(s) SubCutaneous before dinner  insulin lispro Injectable (ADMELOG) 3 Unit(s) SubCutaneous before lunch  phenytoin   Capsule 100 milliGRAM(s) Oral daily  rifAXIMin 550 milliGRAM(s) Oral two times a day    MEDICATIONS  (PRN):  albuterol/ipratropium for Nebulization 3 milliLiter(s) Nebulizer every 6 hours PRN Shortness of Breath and/or Wheezing  melatonin 3 milliGRAM(s) Oral at bedtime PRN Insomnia      Allergies    No Known Allergies    Intolerances        SOCIAL HISTORY:  d/c tobacco and Etoh 1999    FAMILY HISTORY:  FH: type 2 diabetes (Mother)  denies fam hx of malignancy        Vital Signs Last 24 Hrs  T(C): 36.9 (22 Aug 2023 11:58), Max: 36.9 (22 Aug 2023 11:58)  T(F): 98.5 (22 Aug 2023 11:58), Max: 98.5 (22 Aug 2023 11:58)  HR: 109 (22 Aug 2023 11:58) (93 - 109)  BP: 107/74 (22 Aug 2023 11:58) (87/60 - 113/69)  BP(mean): --  RR: 18 (22 Aug 2023 11:58) (18 - 20)  SpO2: 93% (22 Aug 2023 11:58) (93% - 99%)    Parameters below as of 22 Aug 2023 11:58  Patient On (Oxygen Delivery Method): room air        PHYSICAL EXAM:      General:thin man sitting at edge of bed, in no acute distress  Eyes:sclera anicteric, pupils equal and EOMI  ENMT:buccal mucosa moist, nose midline  Neck:supple, trachea midline  Lungs:clear, no wheeze/rhonchi  Cardiovascular:regular rate and rhythm, S1 S2  Abdomen:distended but not tense, nontender, no organomegaly present, bowel sounds normal  Neurological:alert and oriented, Cranial Nerves II-XII grossly intact  Skin:no increased ecchymosis/petechiae/purpura  Lymph Nodes:no palpable cervical/supraclavicular lymph nodes enlargements  Extremities:no cyanosis/clubbing/edema        LABS:                        12.9   14.06 )-----------( 459      ( 22 Aug 2023 07:33 )             38.3     08-22 @ 07:33  WBC14.06  RBC4.75 Hgb12.9 Hct38.3  MCV80.6  [id number]  Auto Yspcyq49.2 Band-- Auto Lymph13.9 Atypical Lymph-- Reactive Lymph-- Auto Mono11.5 Auto Eos8.0 Auto Baso0.9        08-21 @ 17:00  WBC14.02  RBC4.78 Hgb12.9 Hct39.8  MCV83.3  [id number]  Auto Idpqxg06.0 Band-- Auto Lymph13.0 Atypical Lymph-- Reactive Lymph-- Auto Mono9.0 Auto Eos6.0 Auto Baso0.0          08-22    128<L>  |  100  |  26<H>  ----------------------------<  150<H>  4.8   |  22  |  1.40<H>    Ca    8.1<L>      22 Aug 2023 07:33    TPro  7.4  /  Alb  2.1<L>  /  TBili  0.6  /  DBili  x   /  AST  51<H>  /  ALT  32  /  AlkPhos  110  08-22 08-22 @ 07:33  PT12.9 INR1.11  PTT26.8    Urinalysis Basic - ( 22 Aug 2023 07:33 )    Color: x / Appearance: x / SG: x / pH: x  Gluc: 150 mg/dL / Ketone: x  / Bili: x / Urobili: x   Blood: x / Protein: x / Nitrite: x   Leuk Esterase: x / RBC: x / WBC x   Sq Epi: x / Non Sq Epi: x / Bacteria: x        PERIPHERAL BLOOD SMEAR REVIEW:      RADIOLOGY & ADDITIONAL STUDIES:  < from: CT Abdomen and Pelvis w/ IV Cont (08.21.23 @ 17:48) >  ACC: [id number] EXAM:  CT ABDOMEN AND PELVIS IC   ORDERED BY: IRIS REAVES     PROCEDURE DATE:  08/21/2023          INTERPRETATION:  CLINICAL INFORMATION: Left flank pain.    COMPARISON: None.    CONTRAST/COMPLICATIONS:  IV Contrast: Omnipaque 350  90 cc administered   10 cc discarded  Oral Contrast: NONE  Complications: None reported at time of study completion    PROCEDURE:  CT of the Abdomen and Pelvis was performed.  Sagittal and coronal reformats were performed.    FINDINGS:  LOWER CHEST: Total situs inversus.    LIVER: Cirrhosis. A segment 8/4 A exophytic peripherally enhancing   low-density mass extends superiorly to the diaphragm, measuring   approximately 5.2 x 4.1 cm. Additional 5 cm cyst low-density lesions that   are too small to characterize.  BILE DUCTS: Normal caliber.  GALLBLADDER: Within normal limits.  SPLEEN: Within normal limits.  PANCREAS: Within normal limits.  ADRENALS: Within normal limits.  KIDNEYS/URETERS: Within normal limits.    BLADDER: Within normal limits.  REPRODUCTIVE ORGANS: Mildly prominent prostate gland.    BOWEL: No bowel obstruction.  PERITONEUM: Moderate volume ascites with soft tissue implants diffusely   along the peritoneum as well as the serosal surface of the sigmoid colon.   A perisigmoid implant measures approximately 3.9 x 2.8 cm in greatest   transaxial dimension. Soft tissue implants are noted along the falciform   ligament. Omental nodularity is present.  VESSELS: Patent portal, superior mesenteric, and splenic veins. Diffuse   abdominal collaterals..  RETROPERITONEUM/LYMPH NODES: No lymphadenopathy.  ABDOMINAL WALL: Left inguinal hernia containing fluid and fat.  BONES: Degenerative changes.    IMPRESSION: Total situs inversus.  Cirrhosis with a dominant liver mass concerning for primary HCC versus   metastasis.    Peritoneal carcinomatosis and moderate volume ascites.      < end of copied text >

## 2023-08-22 NOTE — PATIENT PROFILE ADULT - FALL HARM RISK - HARM RISK INTERVENTIONS

## 2023-08-22 NOTE — DISCHARGE NOTE PROVIDER - DETAILS OF MALNUTRITION DIAGNOSIS/DIAGNOSES
This patient has been assessed with a concern for Malnutrition and was treated during this hospitalization for the following Nutrition diagnosis/diagnoses:     -  08/23/2023: Underweight (BMI < 19)

## 2023-08-22 NOTE — DISCHARGE NOTE PROVIDER - NSDCMRMEDTOKEN_GEN_ALL_CORE_FT
atorvastatin 10 mg oral tablet: 1 tab(s) orally once a day  Coreg 3.125 mg oral tablet: 1 tab(s) orally 2 times a day  digoxin 125 mcg (0.125 mg) oral tablet: 1 tab(s) orally once a day on Monday - Friday  glimepiride 1 mg oral tablet: 1 tab(s) orally once a day  MetFORMIN (Eqv-Fortamet) 1000 mg oral tablet, extended release: 1 tab(s) orally once a day  phenytoin 100 mg oral capsule: 1 tab(s) orally once a day  ramipril 2.5 mg oral tablet: 1 tab(s) orally once a day  spironolactone 50 mg oral tablet: 1 tab(s) orally once a day  torsemide 10 mg oral tablet: 1 tab(s) orally once a day   alcohol swabs: Apply topically to affected area 1 times a day  Coreg 3.125 mg oral tablet: 1 tab(s) orally 2 times a day  digoxin 125 mcg (0.125 mg) oral tablet: 1 tab(s) orally once a day on Monday - Friday  glimepiride 1 mg oral tablet: 1 tab(s) orally once a day  Insulin Pen Needles, 4mm: 1 application subcutaneously 1 times a day. ** Use with insulin pen **  Lantus Solostar Pen 100 units/mL subcutaneous solution: 20 solution subcutaneous once a day (at bedtime) unit(s) subcutaneous once a day  MetFORMIN (Eqv-Fortamet) 1000 mg oral tablet, extended release: 1 tab(s) orally once a day  midodrine 2.5 mg oral tablet: 1 tab(s) orally 2 times a day  phenytoin 100 mg oral capsule: 1 tab(s) orally once a day  ramipril 2.5 mg oral tablet: 1 tab(s) orally once a day  spironolactone 50 mg oral tablet: 1 tab(s) orally once a day  torsemide 10 mg oral tablet: 1 tab(s) orally once a day

## 2023-08-22 NOTE — CARE COORDINATION ASSESSMENT. - NSCAREPROVIDERS_GEN_ALL_CORE_FT
CARE PROVIDERS:  Accepting Physician: Marcos Mark  Administration: Jerson Chandra  Admitting: Marcos Mark  Attending: Marcos Mark  Cardiology Technician: Paulina Ashton  Consultant: Weil, Patricia  Consultant: Yo Devi  Consultant: Joe Harper  Consultant: Nancy Gallegos  Consultant: Bobby Denson  Consultant: Griffin Toledo  Consultant: Janet Colin  Consultant: Madhuri Sierra  Covering Team: Marcos Brown  Covering Team: Anuja Martinez  ED ACP: Flori Bird  ED Attending: Jason Taylor  ED Nurse: Rosa Reynolds  Nurse: Maria Guadalupe Montiel  Nurse: Landon Sweet  Nurse: Brielle Coleman  Nurse: Jo Chatman  Nurse: Nancy Tyler  Nurse: Silvio Cortes  Nurse: Neli Milian  Ordered: Doctor, Unknown  Ordered: ADM, User  Outpatient Provider: Joe Harper  Override: Cait De  Override: Marian Bass  PCA/Nursing Assistant: Ceasar Car  Primary Team: Tristin Luong  Primary Team: Mery Reid  Primary Team: Merle Beach  Primary Team: Ned Santillan  Primary Team: Yogi Mittal  Primary Team: Karolina Gómez  Primary Team: Lili Crespo  Primary Team: Ciro Leal  Registered Dietitian: Ayana Forte  Respiratory Therapy: Williams Ackerman  : Allison Jimenez  : Hailey Feldman  Student: Willie Haywood  Student: Damien Hill  Team: [facility], Team  UR// Supp. Assoc.: Dimas Stratton

## 2023-08-22 NOTE — PROGRESS NOTE ADULT - PROBLEM SELECTOR PLAN 3
Na 122 on admission  - S/p 2L NS bolus in ED  - Na low at 128 today (improved from admission)   - Hold fluids for now  - Hold home torsemide, spironolactone, and ramipril  - Monitor renal indices  - Nephro Dr. Harper following, f/u recs

## 2023-08-22 NOTE — CONSULT NOTE ADULT - ASSESSMENT
- Patient is   - No clear evidence of acute ischemia, trops negative x 2. Will follow up third set.  - His CKs are flat, suggesting against acute atherosclerotic plaque rupture.  - Biomarker trend is not consistent with plaque rupture but rather demand ischemia. Monitor closely for the development of anginal symptoms or clinical signs of ischemia.   - No acute changes on EKG compared to previous.    - No meaningful evidence of volume overload.  - Previous TTE shows ___.    - BP well controlled, monitor routine hemodynamics.  - Continue ___.    - Monitor and replete lytes, keep K>4, Mg>2.  - Strict I/Os, daily weights.  - Other cardiovascular workup will depend on clinical course.  - All other workup per primary team.  - Will continue to follow.             Patient is a 68y old male who presents with a chief complaint of Liver Cirrhosis with ascites, Liver cancer, and hyponatremia. Cardiology consulted for hx of CHF.    - No clear evidence of acute ischemia.  - Repeat EKG with leads in positions appropriate for situs inversus.  - Hold home medication atorvastatin 10mg in the setting of liver cirrhosis    - No meaningful evidence of volume overload.  - Results of previous TTE not available. Family reports LVEF of 25-30% in 2020.  - Repeat TTE ordered.  - Continue home medications: Coreg 3.125mg BID,  - Hold home medications Torsemide 10mg, Spironolactone 50mg due to MAIA  - Patient is on digoxin 0.125mg on M-F for HF, denies any hx of arrhythmia or Afib. Will hold digoxin at this time.   - Follow-up digoxin level     - BP well controlled, monitor routine hemodynamics.  - Continue home medications: Coreg 3.125mg BID, withhold parameters   - Hold home medications Ramipril 2.5mg due to MAIA    - Monitor and replete lytes, keep K>4, Mg>2.  - Other cardiovascular workup will depend on clinical course.  - All other workup per primary team.  - Will continue to follow.             Patient is a 68y old male who presents with a chief complaint of Liver Cirrhosis with ascites, Liver cancer, and hyponatremia. Cardiology consulted for hx of CHF.    - No clear evidence of acute ischemia.  - Repeat EKG with leads in positions appropriate for situs inversus.  - Hold home medication atorvastatin 10mg in the setting of liver cirrhosis    - No meaningful evidence of volume overload.  - Results of previous TTE not available. Family reports LVEF of 25-30% in 2020.  - Repeat TTE ordered.  - Continue home medications: Coreg 3.125mg BID,  - Hold home medications Torsemide 10mg, Spironolactone 50mg due to MAIA  - Patient is on digoxin 0.125mg on M-F for HF, denies any hx of arrhythmia or Afib.  - Follow-up digoxin level     - BP well controlled, monitor routine hemodynamics.  - Continue home medications: Coreg 3.125mg BID, withhold parameters   - Hold home medications Ramipril 2.5mg due to MAIA    - Monitor and replete lytes, keep K>4, Mg>2.  - Other cardiovascular workup will depend on clinical course.  - All other workup per primary team.  - Will continue to follow.

## 2023-08-22 NOTE — PROGRESS NOTE ADULT - PROBLEM SELECTOR PLAN 9
Expiratory wheezing on exam  - Pt states no hx of asthma, not on any inhalers  - Continue duonebs PRN   - Pulm Dr. Denson consulted, recs appreciated

## 2023-08-22 NOTE — PROGRESS NOTE ADULT - PROBLEM SELECTOR PLAN 1
Chronic, s/p esophageal varices banding [place]   - Used to drink alcohol years ago   - AST 50; ALT and alkaline phosphatase WNL  - CT A/P: Total situs inversus. Cirrhosis with a dominant liver mass concerning for primary HCC versus metastasis. Peritoneal carcinomatosis and moderate volume ascites.  - Start rifaximin 550mg BID as per GI  - GI Dr. Devi consulted, recs appreciated  - Hold hepatotoxic meds Chronic, s/p esophageal varices banding in [place]   - Used to drink alcohol years ago   - AST 50; ALT and alkaline phosphatase WNL  - CT A/P: Total situs inversus. Cirrhosis with a dominant liver mass concerning for primary HCC versus metastasis. Peritoneal carcinomatosis and moderate volume ascites.  - Paracentesis tomorrow with IR as per GI  - Start rifaximin 550mg BID as per GI  - GI Dr. Devi consulted, recs appreciated  - Hold hepatotoxic meds Chronic, s/p esophageal varices banding [place]   - Used to drink alcohol years ago   - AST 50; ALT and alkaline phosphatase WNL  - CT A/P: Total situs inversus. Cirrhosis with a dominant liver mass concerning for primary HCC versus metastasis. Peritoneal carcinomatosis and moderate volume ascites.  - Paracentesis tomorrow with IR as per GI, will dc heparin at midnight today.   - Started rifaximin 550mg BID as per GI  - GI Dr. Devi consulted, recs appreciated  - Hold hepatotoxic meds

## 2023-08-22 NOTE — CONSULT NOTE ADULT - SUBJECTIVE AND OBJECTIVE BOX
Chief Complaint:  Patient is a 68y old  Male who presents with a chief complaint of Liver Cirrhosis with ascites, Liver cancer, hyponatremia (22 Aug 2023 05:55)    T2DM (type 2 diabetes mellitus)    History of seizure    History of bradycardia    GERD (gastroesophageal reflux disease)    History of cirrhosis of liver    HLD (hyperlipidemia)    HTN (hypertension)    H/O esophageal varices       HPI:  67 y/o Mele-speaking, refused , Son Ang translated at bedside. Patient has history of liver cirrhosis s/p esophageal varices banding, situs inversus, HF?, bradycardia, HTN, HLD, T2DM, seizure, GERD presents to ED due to subjective fever for the past 4-5 days associated with weakness, malaise, poor appetite and abdominal bloating, symptoms kept persistently worsening and yesterday he started to have LUQ pain, describes pain as dull/cramping in nature, intermittent, no radiation, Pt also notes one episode of bloody stool with a little bright red blood 2 days ago. Patient lives in [facility] came to [place] only to visit family, full independent in ADL's, denies recent unintentional weight loss, nausea, vomiting, urinary symptoms, or other symptoms at this time     ED course:  Vitals: BP: 87/60-> 113/92, HR: 96, Temp: 97.9, RR: 18, SpO2: 98% on RA  Labs significant for: WBC 14.02, Hgb 12.9, Plt 471, Na 122, Cl 93, BUN 25, Cr 1.8, Glu 450, Alb 2.1, AST 51, eGFR 41, Lactate 3  UA: +glu >1000  CT a/p:  Total situs inversus. Cirrhosis with a dominant liver mass concerning for primary HCC versus metastasis. Peritoneal carcinomatosis and moderate volume ascites.  In ED given Rocephin 1g x1, Humulin 5u x1, NS bolus 1L x2 (21 Aug 2023 18:33)      No Known Allergies      albuterol/ipratropium for Nebulization 3 milliLiter(s) Nebulizer every 6 hours PRN  carvedilol 3.125 milliGRAM(s) Oral every 12 hours  cefTRIAXone   IVPB 2000 milliGRAM(s) IV Intermittent every 24 hours  dextrose 5%. 1000 milliLiter(s) IV Continuous <Continuous>  dextrose 5%. 1000 milliLiter(s) IV Continuous <Continuous>  dextrose 50% Injectable 12.5 Gram(s) IV Push once  dextrose 50% Injectable 25 Gram(s) IV Push once  dextrose 50% Injectable 25 Gram(s) IV Push once  digoxin     Tablet 125 MICROGram(s) Oral <User Schedule>  glucagon  Injectable 1 milliGRAM(s) IntraMuscular once  insulin glargine Injectable (LANTUS) 5 Unit(s) SubCutaneous every morning  insulin lispro (ADMELOG) corrective regimen sliding scale   SubCutaneous at bedtime  insulin lispro (ADMELOG) corrective regimen sliding scale   SubCutaneous three times a day before meals  insulin lispro Injectable (ADMELOG) 3 Unit(s) SubCutaneous before breakfast  insulin lispro Injectable (ADMELOG) 3 Unit(s) SubCutaneous before dinner  insulin lispro Injectable (ADMELOG) 3 Unit(s) SubCutaneous before lunch  melatonin 3 milliGRAM(s) Oral at bedtime PRN  phenytoin   Capsule 100 milliGRAM(s) Oral daily        FAMILY HISTORY:  FH: type 2 diabetes (Mother)          Review of Systems:    General:  No wt loss, fevers, chills, night sweats,fatigue,   Eyes:  Good vision, no reported pain  ENT:  No sore throat, pain, runny nose, dysphagia  CV:  No pain, palpitatioins, hypo/hypertension  Resp:  No dyspnea, cough, tachypnea, wheezing  :  No pain, bleeding, incontinence, nocturia  Muscle:  No pain, weakness  Neuro:  No weakness, tingling, memory problems  Psych:  No fatigue, insomnia, mood problems, depression  Endocrine:  No polyuria, polydypsia, cold/heat intolerance  Heme:  No petechiae, ecchymosis, easy bruisability  Skin:  No rash, tattoos, scars, edema    Relevant Family History:       Relevant Social History:       Physical Exam:    Vital Signs:  Vital Signs Last 24 Hrs  T(C): 36.4 (22 Aug 2023 05:58), Max: 36.7 (21 Aug 2023 18:00)  T(F): 97.6 (22 Aug 2023 05:58), Max: 98 (21 Aug 2023 18:00)  HR: 103 (22 Aug 2023 05:58) (93 - 103)  BP: 107/76 (22 Aug 2023 05:58) (87/60 - 113/69)  BP(mean): --  RR: 19 (22 Aug 2023 05:58) (18 - 20)  SpO2: 99% (22 Aug 2023 05:58) (96% - 99%)    Parameters below as of 21 Aug 2023 23:45  Patient On (Oxygen Delivery Method): room air      Daily Height in cm: 175.26 (21 Aug 2023 16:01)    Daily Weight in k.9 (22 Aug 2023 05:58)    General:  Appears stated age, well-groomed, well-nourished, no distress  HEENT:  NC/AT,  conjunctivae clear and pink, no thyromegaly, nodules, adenopathy, no JVD  Chest:  Full & symmetric excursion, no increased effort, breath sounds clear  Cardiovascular:  Regular rhythm, S1, S2, no murmur/rub/S3/S4, no abdominal bruit, no edema  Abdomen:  Soft, non-tender, non-distended, normoactive bowel sounds,  no masses ,no hepatosplenomeagaly, no signs of chronic liver disease  Extremities:  no cyanosis,clubbing or edema  Skin:  No rash/erythema/ecchymoses/petechiae/wounds/abscess/warm/dry  Neuro/Psych:  Alert, oriented, no asterixis, no tremor, no encephalopathy    Laboratory:                            12.9   14.06 )-----------( 459      ( 22 Aug 2023 07:33 )             38.3     08-22    128<L>  |  100  |  26<H>  ----------------------------<  150<H>  4.8   |  22  |  1.40<H>    Ca    8.1<L>      22 Aug 2023 07:33    TPro  7.4  /  Alb  2.1<L>  /  TBili  0.6  /  DBili  x   /  AST  51<H>  /  ALT  32  /  AlkPhos  110  08-22    LIVER FUNCTIONS - ( 22 Aug 2023 07:33 )  Alb: 2.1 g/dL / Pro: 7.4 g/dL / ALK PHOS: 110 U/L / ALT: 32 U/L / AST: 51 U/L / GGT: x           PT/INR - ( 22 Aug 2023 07:33 )   PT: 12.9 sec;   INR: 1.11 ratio         PTT - ( 22 Aug 2023 07:33 )  PTT:26.8 sec  Urinalysis Basic - ( 22 Aug 2023 07:33 )    Color: x / Appearance: x / SG: x / pH: x  Gluc: 150 mg/dL / Ketone: x  / Bili: x / Urobili: x   Blood: x / Protein: x / Nitrite: x   Leuk Esterase: x / RBC: x / WBC x   Sq Epi: x / Non Sq Epi: x / Bacteria: x      Amylase Serum--      Lipase [id number]       Ammonia--    Imaging:

## 2023-08-22 NOTE — CONSULT NOTE ADULT - ASSESSMENT
67 y/o man visiting from [place], hx cirrhosis post esophageal varices banding 2020 2021, situs inversus, HF?, bradycardia, HTN, HLD, T2DM, seizure, GERD  Came to ER c/o 4-5 d weakness, malaise, LUQ abdominal pain, abdomen bloating  ED course:  Vitals: BP: 87/60-> 113/92, HR: 96, Temp: 97.9, RR: 18, SpO2: 98% on RA  Labs significant for: WBC 14.02, Hgb 12.9, Plt 471, Na 122, Cl 93, BUN 25, Cr 1.8, Glu 450, Alb 2.1, AST 51, eGFR 41, Lactate 3  UA: +glu >1000  CT a/p:  Total situs inversus. Cirrhosis w exophytic luis enhancing low density mass extending to diaphragm 5.2x4.1cm, add 5cm cyst, low density lesions too small to renetta. Mod ascites w abd carcinomatosis and implants noted up to 3.9cm    wife denies ever had imaging of abdomen done, reports first endoscopy was done due to "ulcer bleed"  dc Etoh and tobaco 1999  no night sweats fever anorecia weight loss, luis edema    -hx esophageal banding 2020, 2021, CT a/p w liver cirrhosis, 5,2cm exophytic liver mass mod scites and sig abdominal carcinomatosis  -agree worrisome for met hepatocellular carcinoma  -have ordered AFP, may still need tissue bx for diagnosis and NGS markers studies for potential targeted therapy  -GI consulted    further recommendations pending above  discussed w pt and family    thank you, will follow w you

## 2023-08-22 NOTE — PHYSICAL THERAPY INITIAL EVALUATION ADULT - PERTINENT HX OF CURRENT PROBLEM, REHAB EVAL
69 y/o Mele-speaking, Son Ang translated at bedside. Patient has history of liver cirrhosis s/p esophageal varices banding, situs inversus, HF?, bradycardia, HTN, HLD, T2DM, seizure, GERD presents to ED due to subjective fever for the past 4-5 days associated with weakness, malaise, poor appetite and abdominal bloating, symptoms kept persistently worsening and yesterday he started to have LUQ pain, describes pain as dull/cramping in nature, intermittent, no radiation. Pt also notes one episode of bloody stool with a little bright red blood 2 days ago. Patient lives in [facility] came to [place] only to visit family, full independent in ADL's, denies recent unintentional weight loss, nausea, vomiting, urinary symptoms, or other symptoms at this time. CT a/p:  Total situs inversus. Cirrhosis with a dominant liver mass concerning for primary HCC versus metastasis. Peritoneal carcinomatosis and moderate volume ascites.

## 2023-08-22 NOTE — PROGRESS NOTE ADULT - ASSESSMENT
67 yo Mele-speaking male PMH of liver cirrhosis s/p esophageal varices banding, situs inversus, HF?, bradycardia, HTN, HLD, T2DM, seizure disorder, GERD presents to ED for 4-5 days of subjective fever, weakness, malaise, decreased appetite, abd bloating and LUQ pain. Admitted for liver cirrhosis with ascites, concerning for HCC, complicated with sepsis possible secondary to SBP and electrolyte imbalance/hyponatremia.

## 2023-08-22 NOTE — PROGRESS NOTE ADULT - PROBLEM SELECTOR PLAN 6
Patient reports one episode of red bright blood in the stools, denies history of hemorrhoids.   - F/u FOBT   - Start heparin as pt's H/H stable  - GI Dr. Devi following, f/u recs  - Monitor Hgb (slightly low but stable)    - On PPI Patient reports one episode of red bright blood in the stools, denies history of hemorrhoids.   - F/u FOBT   - Monitor Hgb (slightly low but stable)    - Start heparin as pt's H/H stable, d/c at midnight for paracentesis tomorrow  - GI Dr. Devi following, f/u recs

## 2023-08-22 NOTE — CARE COORDINATION ASSESSMENT. - OTHER PERTINENT DISCHARGE PLANNING INFORMATION:
Discussed at daily rounds. Pt. appears alert and oriented. Son and spouse at bedside. He speaks mainly Mele but understands some English. Aware of availability of . Son states he has been here visiting from [place] for  3 weeks. Patient & spouse currently staying with son & dtr in law in private home in [place], no stairs. In [place] he resides with wife, has elevator there, no stairs in home. He walks independently and cares for himself. He has not had HC in past. Son assists with transportation as needed. Per PT he has no PT needs. Family aware. Educated re. role of social work and provided d/c planning folder including social work &  names & #s. No d/c needs indicated at this time. Encouraged them to call if needed.

## 2023-08-22 NOTE — CARE COORDINATION ASSESSMENT. - LIVES WITH, PROFILE
Resides in [place] with spouse, has elevator.  Currently staying with son in private house, no stairs./spouse

## 2023-08-22 NOTE — CONSULT NOTE ADULT - ATTENDING COMMENTS
Patient with known cirrhosis and ascites was admitted with LUQ pain and since has situs inversus was found with a liver mass concerning for malignancy and also peritoneal carcinomatosis. No fever but has leukocytosis that could be reactional.  Most likely mass and malignancy is causing all his symptoms but due to a moderate ascites and abdominal pain will cover for possible infection and SBP for now until we have the cultures back.

## 2023-08-22 NOTE — DISCHARGE NOTE PROVIDER - HOSPITAL COURSE
FROM ADMISSION H+P:   HPI:  67 y/o Mele-speaking, refused , Son Ang translated at bedside. Patient has history of liver cirrhosis s/p esophageal varices banding, situs inversus, HF?, bradycardia, HTN, HLD, T2DM, seizure, GERD presents to ED due to subjective fever for the past 4-5 days associated with weakness, malaise, poor appetite and abdominal bloating, symptoms kept persistently worsening and yesterday he started to have LUQ pain, describes pain as dull/cramping in nature, intermittent, no radiation, Pt also notes one episode of bloody stool with a little bright red blood 2 days ago. Patient lives in [place], full independent in ADL's, denies recent unintentional weight loss, nausea, vomiting, urinary symptoms, or other symptoms at this time     ED course:  Vitals: BP: 87/60-> 113/92, HR: 96, Temp: 97.9, RR: 18, SpO2: 98% on RA  Labs significant for: WBC 14.02, Hgb 12.9, Plt 471, Na 122, Cl 93, BUN 25, Cr 1.8, Glu 450, Alb 2.1, AST 51, eGFR 41, Lactate 3  UA: +glu >1000  CT a/p:  Total situs inversus. Cirrhosis with a dominant liver mass concerning for primary HCC versus metastasis. Peritoneal carcinomatosis and moderate volume ascites.  In ED given Rocephin 1g x1, Humulin 5u x1, NS bolus 1L x2 (21 Aug 2023 18:33)      ---  HOSPITAL COURSE: Patient was admitted on 8/21/23. GI, Heme/Onc, ID, Pulm, Endo, Cardio, and Nephro were consulted.     Patient presented with LUQ abdominal pain associated with weakness, poor appetite, and bloating. CT Abdomen and Pelvis revealed cirrhosis with a dominant liver mass concerning for primary HCC versus metastasis; peritoneal carcinomatosis and moderate volume ascites. Patient met sepsis criteria in the ED with unclear source. He was started on IV Rocephin to cover for potential spontaneous bacterial peritonitis. GI recommended paracentesis to remove the fluid with IR. ***    The patient was examined at the bedside on the day of discharge. On day of discharge, patient is stable for discharge to ______ with close outpatient follow up:    VS reviewed and within normal limits. No concerning findings on exam. Care plan reviewed with caregivers. Caregivers in agreement and endorse understanding.  Pt deemed stable for d/c home w/ anticipatory guidance and strict indications for return. No outstanding issues or concerns noted.     Vitals on day of discharge:    Physical exam on day of discharge:    ---  CONSULTANTS:   GI (Dr. Devi)   Heme/Onc (Dr. Winter)   Nephro (Dr. Harper)   ID (Dr. Allred)   Pulm (Dr. Denson)     ---  TIME SPENT:  I, the attending physician, was physically present for the key portions of the evaluation and management (E/M) service provided. The total amount of time spent reviewing the hospital notes, laboratory values, imaging findings, assessing/counseling the patient, discussing with consultant physicians, social work, nursing staff was -- minutes    ---  Primary care provider was made aware of plan for discharge:      [  ] NO     [  ] YES   FROM ADMISSION H+P:   HPI:  67 y/o Mele-speaking, refused , Son Ang translated at bedside. Patient has history of liver cirrhosis s/p esophageal varices banding, situs inversus, HF?, bradycardia, HTN, HLD, T2DM, seizure, GERD presents to ED due to subjective fever for the past 4-5 days associated with weakness, malaise, poor appetite and abdominal bloating, symptoms kept persistently worsening and yesterday he started to have LUQ pain, describes pain as dull/cramping in nature, intermittent, no radiation, Pt also notes one episode of bloody stool with a little bright red blood 2 days ago. Patient lives in [facility] came to [place], full independent in ADL's, denies recent unintentional weight loss, nausea, vomiting, urinary symptoms, or other symptoms at this time     ED course:  Vitals: BP: 87/60-> 113/92, HR: 96, Temp: 97.9, RR: 18, SpO2: 98% on RA  Labs significant for: WBC 14.02, Hgb 12.9, Plt 471, Na 122, Cl 93, BUN 25, Cr 1.8, Glu 450, Alb 2.1, AST 51, eGFR 41, Lactate 3  UA: +glu >1000  CT a/p:  Total situs inversus. Cirrhosis with a dominant liver mass concerning for primary HCC versus metastasis. Peritoneal carcinomatosis and moderate volume ascites.  In ED given Rocephin 1g x1, Humulin 5u x1, NS bolus 1L x2 (21 Aug 2023 18:33)      ---  HOSPITAL COURSE: Patient was admitted on 8/21/23. GI, Heme/Onc, ID, Pulm, Endo, Cardio, and Nephro were consulted.     Patient presented with LUQ abdominal pain associated with weakness, poor appetite, and bloating. CT Abdomen and Pelvis revealed cirrhosis with a dominant liver mass concerning for primary HCC versus metastasis; peritoneal carcinomatosis and moderate volume ascites. Patient met sepsis criteria in the ED with unclear source. He was started on IV Rocephin to cover for potential spontaneous bacterial peritonitis. GI recommended paracentesis to remove the fluid with IR. **    The patient was examined at the bedside on the day of discharge. On day of discharge, patient is stable for discharge to ______ with close outpatient follow up:    VS reviewed and within normal limits. No concerning findings on exam. Care plan reviewed with caregivers. Caregivers in agreement and endorse understanding.  Pt deemed stable for d/c home w/ anticipatory guidance and strict indications for return. No outstanding issues or concerns noted.     Vitals on day of discharge:    Physical exam on day of discharge:    ---  CONSULTANTS:   GI (Dr. Devi)   Heme/Onc (Dr. Winter)   Nephro (Dr. Harper)   ID (Dr. Allred)   Pulm (Dr. Denson)     ---  TIME SPENT:  I, the attending physician, was physically present for the key portions of the evaluation and management (E/M) service provided. The total amount of time spent reviewing the hospital notes, laboratory values, imaging findings, assessing/counseling the patient, discussing with consultant physicians, social work, nursing staff was -- minutes    ---  Primary care provider was made aware of plan for discharge:      [  ] NO     [  ] YES   ADMISSION DATE:  08-21-23    ---  FROM ADMISSION H+P:   HPI:  69 y/o Mele-speaking, refused , Son Ang translated at bedside. Patient has history of liver cirrhosis s/p esophageal varices banding, situs inversus, HF?, bradycardia, HTN, HLD, T2DM, seizure, GERD presents to ED due to subjective fever for the past 4-5 days associated with weakness, malaise, poor appetite and abdominal bloating, symptoms kept persistently worsening and yesterday he started to have LUQ pain, describes pain as dull/cramping in nature, intermittent, no radiation, Pt also notes one episode of bloody stool with a little bright red blood 2 days ago. Patient lives in [facility] came to [place] family, full independent in ADL's, denies recent unintentional weight loss, nausea, vomiting, urinary symptoms, or other symptoms at this time     ED course:  Vitals: BP: 87/60-> 113/92, HR: 96, Temp: 97.9, RR: 18, SpO2: 98% on RA  Labs significant for: WBC 14.02, Hgb 12.9, Plt 471, Na 122, Cl 93, BUN 25, Cr 1.8, Glu 450, Alb 2.1, AST 51, eGFR 41, Lactate 3  UA: +glu >1000  CT a/p:  Total situs inversus. Cirrhosis with a dominant liver mass concerning for primary HCC versus metastasis. Peritoneal carcinomatosis and moderate volume ascites.  In ED given Rocephin 1g x1, Humulin 5u x1, NS bolus 1L x2 (21 Aug 2023 18:33)      ---  HOSPITAL COURSE/PERTINENT LABS/PROCEDURES PERFORMED/PENDING TESTS:   Pt was admitted for liver cirrhosis with ascites, concern for hepatocellular carcinoma, complicated w/ sepsis possibly due to SBP and electrolyte imbalances. GI, Heme/Onc, ID, Pulm, Endo, Cardio, and Nephro were consulted. Pt was treated empirically with ceftriaxone for possible SBP. Started on rifaximin. IR paracentesis, drained 4.7L. Fluid studies ***. Found to be hyperkalemic in the hospital. Managed with lokelma. Lasix used to treat SOB from ascites.         Patient is stable for discharge as per primary medical team and consultants.    PT consulted, recommends discharge ______    Patient showed improvement throughout hospitalization. Patient was seen and examined on day of discharge. Patient was medically optimized for discharge with close outpatient follow up.    ---  PATIENT CONDITION:  - stable    --  VITALS:   T(C): 36.9 (08-23-23 @ 14:31), Max: 36.9 (08-23-23 @ 14:31)  HR: 102 (08-23-23 @ 14:31) (102 - 125)  BP: 105/60 (08-23-23 @ 14:31) (105/60 - 143/70)  RR: 20 (08-23-23 @ 14:31) (18 - 26)  SpO2: 99% (08-23-23 @ 14:31) (96% - 99%)    PHYSICAL EXAM ON DAY OF DISCHARGE:     ---  CONSULTANTS:   GI (Dr. Devi)   Heme/Onc (Dr. Winter)   Nephro (Dr. Harper)   ID (Dr. Allred)   Pulm (Dr. Denson)       ---  ADVANCED CARE PLANNING:  - Code status:      - MOLST completed:      [  ] NO     [  ] YES    ---  TIME SPENT:  I, the attending physician, was physically present for the key portions of the evaluation and management (E/M) service provided. The total amount of time spent reviewing the hospital notes, laboratory values, imaging findings, assessing/counseling the patient, discussing with consultant physicians, social work, nursing staff was -- minutes       ADMISSION DATE:  08-21-23    ---  FROM ADMISSION H+P:   HPI:  69 y/o Mele-speaking, refused , Son Ang translated at bedside. Patient has history of liver cirrhosis s/p esophageal varices banding, situs inversus, HF?, bradycardia, HTN, HLD, T2DM, seizure, GERD presents to ED due to subjective fever for the past 4-5 days associated with weakness, malaise, poor appetite and abdominal bloating, symptoms kept persistently worsening and yesterday he started to have LUQ pain, describes pain as dull/cramping in nature, intermittent, no radiation, Pt also notes one episode of bloody stool with a little bright red blood 2 days ago. Patient lives in [facility] came to [place] to [place] family, full independent in ADL's, denies recent unintentional weight loss, nausea, vomiting, urinary symptoms, or other symptoms at this time     ED course:  Vitals: BP: 87/60-> 113/92, HR: 96, Temp: 97.9, RR: 18, SpO2: 98% on RA  Labs significant for: WBC 14.02, Hgb 12.9, Plt 471, Na 122, Cl 93, BUN 25, Cr 1.8, Glu 450, Alb 2.1, AST 51, eGFR 41, Lactate 3  UA: +glu >1000  CT a/p:  Total situs inversus. Cirrhosis with a dominant liver mass concerning for primary HCC versus metastasis. Peritoneal carcinomatosis and moderate volume ascites.  In ED given Rocephin 1g x1, Humulin 5u x1, NS bolus 1L x2 (21 Aug 2023 18:33)      ---  HOSPITAL COURSE/PERTINENT LABS/PROCEDURES PERFORMED/PENDING TESTS:   Pt was admitted for liver cirrhosis with ascites, concern for hepatocellular carcinoma, complicated w/ sepsis possibly due to SBP and electrolyte imbalances. GI, Heme/Onc, ID, Pulm, Endo, Cardio, and Nephro were consulted. Pt was treated empirically with ceftriaxone for possible SBP. Started on rifaximin. IR paracentesis, drained 4.7L. Fluid studies showed PMN leukocytes with no organisms. Calculated SAAG was 1.1 which indicates that the ascites may be secondary to portal HTN. Found to be hyperkalemic in the hospital. Managed with lokelma. Lasix used to treat SOB from ascites.         Patient is stable for discharge as per primary medical team and consultants.    PT consulted, recommends discharge ______    Patient showed improvement throughout hospitalization. Patient was seen and examined on day of discharge. Patient was medically optimized for discharge with close outpatient follow up.    ---  PATIENT CONDITION:  - stable    --  VITALS:   T(C): 36.9 (08-23-23 @ 14:31), Max: 36.9 (08-23-23 @ 14:31)  HR: 102 (08-23-23 @ 14:31) (102 - 125)  BP: 105/60 (08-23-23 @ 14:31) (105/60 - 143/70)  RR: 20 (08-23-23 @ 14:31) (18 - 26)  SpO2: 99% (08-23-23 @ 14:31) (96% - 99%)    PHYSICAL EXAM ON DAY OF DISCHARGE:     ---  CONSULTANTS:   GI (Dr. Devi)   Heme/Onc (Dr. Winter)   Nephro (Dr. Harper)   ID (Dr. Allred)   Pulm (Dr. Denson)       ---  ADVANCED CARE PLANNING:  - Code status:      - MOLST completed:      [  ] NO     [  ] YES    ---  TIME SPENT:  I, the attending physician, was physically present for the key portions of the evaluation and management (E/M) service provided. The total amount of time spent reviewing the hospital notes, laboratory values, imaging findings, assessing/counseling the patient, discussing with consultant physicians, social work, nursing staff was -- minutes       ADMISSION DATE:  08-21-23    ---  FROM ADMISSION H+P:   HPI:  67 y/o Mele-speaking, refused , Son Ang translated at bedside. Patient has history of liver cirrhosis s/p esophageal varices banding, situs inversus, HF?, bradycardia, HTN, HLD, T2DM, seizure, GERD presents to ED due to subjective fever for the past 4-5 days associated with weakness, malaise, poor appetite and abdominal bloating, symptoms kept persistently worsening and yesterday he started to have LUQ pain, describes pain as dull/cramping in nature, intermittent, no radiation, Pt also notes one episode of bloody stool with a little bright red blood 2 days ago. Patient lives in [facility] came to [place] to [place] family, full independent in ADL's, denies recent unintentional weight loss, nausea, vomiting, urinary symptoms, or other symptoms at this time     ED course:  Vitals: BP: 87/60-> 113/92, HR: 96, Temp: 97.9, RR: 18, SpO2: 98% on RA  Labs significant for: WBC 14.02, Hgb 12.9, Plt 471, Na 122, Cl 93, BUN 25, Cr 1.8, Glu 450, Alb 2.1, AST 51, eGFR 41, Lactate 3  UA: +glu >1000  CT a/p:  Total situs inversus. Cirrhosis with a dominant liver mass concerning for primary HCC versus metastasis. Peritoneal carcinomatosis and moderate volume ascites.  In ED given Rocephin 1g x1, Humulin 5u x1, NS bolus 1L x2 (21 Aug 2023 18:33)      ---  HOSPITAL COURSE/PERTINENT LABS/PROCEDURES PERFORMED/PENDING TESTS:   Pt was admitted for liver cirrhosis with ascites, concern for hepatocellular carcinoma, complicated w/ sepsis possibly due to SBP and electrolyte imbalances. GI, Heme/Onc, ID, Pulm, Endo, Cardio, and Nephro were consulted. Pt was treated empirically with ceftriaxone for possible SBP. Started on rifaximin. IR paracentesis, drained 4.7L. Fluid studies showed PMN leukocytes with no organisms. Calculated SAAG >1.1 which indicates that the ascites may be secondary to portal HTN. Found to be hyperkalemic in the hospital. Managed with lokelma. Lasix used to treat SOB from ascites. Found to be hyponatremic on admission. Managed w/ fluid restriction and salt tabs.         Patient is stable for discharge as per primary medical team and consultants.    PT consulted, recommends discharge ______    Patient showed improvement throughout hospitalization. Patient was seen and examined on day of discharge. Patient was medically optimized for discharge with close outpatient follow up.    ---  PATIENT CONDITION:  - stable    --  VITALS:   T(C): 36.9 (08-23-23 @ 14:31), Max: 36.9 (08-23-23 @ 14:31)  HR: 102 (08-23-23 @ 14:31) (102 - 125)  BP: 105/60 (08-23-23 @ 14:31) (105/60 - 143/70)  RR: 20 (08-23-23 @ 14:31) (18 - 26)  SpO2: 99% (08-23-23 @ 14:31) (96% - 99%)    PHYSICAL EXAM ON DAY OF DISCHARGE:     ---  CONSULTANTS:   GI (Dr. Devi)   Heme/Onc (Dr. Winter)   Nephro (Dr. Harper)   ID (Dr. Allred)   Pulm (Dr. Denson)       ---  ADVANCED CARE PLANNING:  - Code status:      - MOLST completed:      [  ] NO     [  ] YES    ---  TIME SPENT:  I, the attending physician, was physically present for the key portions of the evaluation and management (E/M) service provided. The total amount of time spent reviewing the hospital notes, laboratory values, imaging findings, assessing/counseling the patient, discussing with consultant physicians, social work, nursing staff was -- minutes       ADMISSION DATE:  08-21-23    ---  FROM ADMISSION H+P:   HPI:  69 y/o Mele-speaking, refused , Son Ang translated at bedside. Patient has history of liver cirrhosis s/p esophageal varices banding, situs inversus, HF?, bradycardia, HTN, HLD, T2DM, seizure, GERD presents to ED due to subjective fever for the past 4-5 days associated with weakness, malaise, poor appetite and abdominal bloating, symptoms kept persistently worsening and yesterday he started to have LUQ pain, describes pain as dull/cramping in nature, intermittent, no radiation, Pt also notes one episode of bloody stool with a little bright red blood 2 days ago. Patient lives in [facility] came to [place] family, full independent in ADL's, denies recent unintentional weight loss, nausea, vomiting, urinary symptoms, or other symptoms at this time     ED course:  Vitals: BP: 87/60-> 113/92, HR: 96, Temp: 97.9, RR: 18, SpO2: 98% on RA  Labs significant for: WBC 14.02, Hgb 12.9, Plt 471, Na 122, Cl 93, BUN 25, Cr 1.8, Glu 450, Alb 2.1, AST 51, eGFR 41, Lactate 3  UA: +glu >1000  CT a/p:  Total situs inversus. Cirrhosis with a dominant liver mass concerning for primary HCC versus metastasis. Peritoneal carcinomatosis and moderate volume ascites.  In ED given Rocephin 1g x1, Humulin 5u x1, NS bolus 1L x2 (21 Aug 2023 18:33)      ---  HOSPITAL COURSE/PERTINENT LABS/PROCEDURES PERFORMED/PENDING TESTS:   Patient was admitted for liver cirrhosis with ascites, concern for hepatocellular carcinoma, complicated w/ sepsis possibly due to SBP and electrolyte imbalances. GI, Heme/Onc, ID, Pulm, Endo, Cardio, and Nephro were consulted. Patient was treated empirically with ceftriaxone 2g for possible SBP, once low suspicion brought down to ceftriaxone 1g to complete 5 day course. Started on rifaximin as per GI. Continued home spironolactone at lower dose given hyperkalemia. IR paracentesis, drained 4.7L. Fluid studies showed PMN leukocytes with no organisms. ***Calculated SAAG >1.1 which could indicate possible ascites due to portal HTN, however still suspicious of mets. Found to be hyperkalemic in the hospital. Managed with lokelma. Lasix used to treat SOB from ascites. Found to be hyponatremic on admission. Managed w/ fluid restriction and salt tabs. Patient tachycardic on admission, managed with home coreg and digoxin.       Patient is stable for discharge as per primary medical team and consultants.    PT consulted, recommends discharge ______    Patient showed improvement throughout hospitalization. Patient was seen and examined on day of discharge. Patient was medically optimized for discharge with close outpatient follow up.    ---  PATIENT CONDITION:  - stable    --  VITALS:   T(C): 36.9 (08-23-23 @ 14:31), Max: 36.9 (08-23-23 @ 14:31)  HR: 102 (08-23-23 @ 14:31) (102 - 125)  BP: 105/60 (08-23-23 @ 14:31) (105/60 - 143/70)  RR: 20 (08-23-23 @ 14:31) (18 - 26)  SpO2: 99% (08-23-23 @ 14:31) (96% - 99%)    PHYSICAL EXAM ON DAY OF DISCHARGE:     ---  CONSULTANTS:   GI (Dr. Devi)   Heme/Onc (Dr. Winter)   Nephro (Dr. Harper)   ID (Dr. Allred)   Pulm (Dr. Denson)       ---  ADVANCED CARE PLANNING:  - Code status:      - MOLST completed:      [  ] NO     [  ] YES    ---  TIME SPENT:  I, the attending physician, was physically present for the key portions of the evaluation and management (E/M) service provided. The total amount of time spent reviewing the hospital notes, laboratory values, imaging findings, assessing/counseling the patient, discussing with consultant physicians, social work, nursing staff was -- minutes

## 2023-08-23 NOTE — PROGRESS NOTE ADULT - PROBLEM SELECTOR PLAN 3
Na 122 on admission  - S/p 2L NS bolus in ED  - Na low 128 yesterday --> 125 today (improved from admission)  - Hold fluids for now  - Hold home torsemide and ramipril  - Started lasix and spironolactone   - Monitor renal indices  - Nephro Dr. Harper following, f/u recs Na 122 on admission  - S/p 2L NS bolus in ED  - Na low 128 yesterday --> 125 today   - continue fluid restriction   - Hold home torsemide and ramipril given worsening Cr.  - Given lasix 20mg IV x2 for ascites.  - Started spironolactone as per above  - Monitor renal indices  - Nephro Dr. Harper following, f/u recs    #Hyperkalemia  - K 5.5, repeat K 5.6, given lokelma.  - replete w/ lokelma prn  - Nephro following

## 2023-08-23 NOTE — PROGRESS NOTE ADULT - PROBLEM SELECTOR PLAN 9
Expiratory wheezing on exam  - Pt states no hx of asthma, not on any inhalers  - Continue duonebs PRN   - Pulm Dr. Denson consulted, recs appreciated Expiratory wheezing on exam  - Pt states no hx of asthma, not on any inhalers  - Continue duonebs PRN   - Pulm Dr. Denson consulted,

## 2023-08-23 NOTE — PROGRESS NOTE ADULT - SUBJECTIVE AND OBJECTIVE BOX
[facility] -- Abhi Alvarenga Pannella, Patel, Savella, Goodger, Cohen  Office # [phone]    Follow Up:  CHF     Subjective/Observations: seen and examined, awake, alert, family at bedside, c/o SOB, denies chest pain, dyspnea, palpitations or dizziness. Tolerating O2 via NC    REVIEW OF SYSTEMS: All other review of systems is negative unless indicated above  PAST MEDICAL & SURGICAL HISTORY:  T2DM (type 2 diabetes mellitus)      History of seizure      History of bradycardia      GERD (gastroesophageal reflux disease)      History of cirrhosis of liver      HLD (hyperlipidemia)      HTN (hypertension)      H/O esophageal varices        MEDICATIONS  (STANDING):  carvedilol 3.125 milliGRAM(s) Oral every 12 hours  cefTRIAXone   IVPB 2000 milliGRAM(s) IV Intermittent every 24 hours  dextrose 5%. 1000 milliLiter(s) (50 mL/Hr) IV Continuous <Continuous>  dextrose 5%. 1000 milliLiter(s) (100 mL/Hr) IV Continuous <Continuous>  dextrose 50% Injectable 25 Gram(s) IV Push once  dextrose 50% Injectable 12.5 Gram(s) IV Push once  dextrose 50% Injectable 25 Gram(s) IV Push once  glucagon  Injectable 1 milliGRAM(s) IntraMuscular once  insulin glargine Injectable (LANTUS) 5 Unit(s) SubCutaneous every morning  insulin lispro (ADMELOG) corrective regimen sliding scale   SubCutaneous three times a day before meals  insulin lispro (ADMELOG) corrective regimen sliding scale   SubCutaneous at bedtime  insulin lispro Injectable (ADMELOG) 3 Unit(s) SubCutaneous before breakfast  insulin lispro Injectable (ADMELOG) 3 Unit(s) SubCutaneous before dinner  insulin lispro Injectable (ADMELOG) 3 Unit(s) SubCutaneous before lunch  phenytoin   Capsule 100 milliGRAM(s) Oral daily  rifAXIMin 550 milliGRAM(s) Oral two times a day  spironolactone 25 milliGRAM(s) Oral daily    MEDICATIONS  (PRN):  albumin human 25% IVPB 50 milliLiter(s) IV Intermittent once PRN paracentesis  albuterol/ipratropium for Nebulization 3 milliLiter(s) Nebulizer every 6 hours PRN Shortness of Breath and/or Wheezing  melatonin 3 milliGRAM(s) Oral at bedtime PRN Insomnia    Allergies    No Known Allergies    Intolerances      Vital Signs Last 24 Hrs  T(C): 36.4 (23 Aug 2023 04:23), Max: 36.9 (22 Aug 2023 11:58)  T(F): 97.6 (23 Aug 2023 04:23), Max: 98.5 (22 Aug 2023 11:58)  HR: 114 (23 Aug 2023 09:01) (109 - 125)  BP: 121/82 (23 Aug 2023 09:01) (106/74 - 135/84)  BP(mean): --  RR: 18 (23 Aug 2023 04:23) (18 - 18)  SpO2: 97% (23 Aug 2023 04:23) (93% - 97%)    Parameters below as of 23 Aug 2023 04:23  Patient On (Oxygen Delivery Method): room air      I&O's Summary      TELE: SR/  up to 140's non sustained   PHYSICAL EXAM:  Constitutional: NAD, awake and alert  HEENT: Moist Mucous Membranes, Anicteric  Pulmonary: labored, bibasilar rales, No wheezing, rales or rhonchi  Cardiovascular: Regular, S1 and S2, No murmurs, rubs, gallops or clicks  Gastrointestinal: Bowel Sounds present, soft, nontender, distended  Lymph: No peripheral edema. No lymphadenopathy.  Skin: No visible rashes or ulcers.  Psych:  Mood & affect appropriate  LABS: All Labs Reviewed:                        14.6   12.47 )-----------( 536      ( 23 Aug 2023 06:18 )             44.1                         12.9   14.06 )-----------( 459      ( 22 Aug 2023 07:33 )             38.3                         12.9   14.02 )-----------( 471      ( 21 Aug 2023 17:00 )             39.8     23 Aug 2023 06:18    125    |  98     |  32     ----------------------------<  171    5.5     |  19     |  1.70   22 Aug 2023 07:33    128    |  100    |  26     ----------------------------<  150    4.8     |  22     |  1.40   22 Aug 2023 03:37    130    |  100    |  25     ----------------------------<  195    4.7     |  24     |  1.50     Ca    8.5        23 Aug 2023 06:18  Ca    8.1        22 Aug 2023 07:33  Ca    7.7        22 Aug 2023 03:37  Phos  3.7       23 Aug 2023 06:18  Mg     2.3       23 Aug 2023 06:18    TPro  7.8    /  Alb  2.1    /  TBili  0.8    /  DBili  x      /  AST  57     /  ALT  36     /  AlkPhos  138    23 Aug 2023 06:18  TPro  7.4    /  Alb  2.1    /  TBili  0.6    /  DBili  x      /  AST  51     /  ALT  32     /  AlkPhos  110    22 Aug 2023 07:33  TPro  7.8    /  Alb  2.1    /  TBili  0.8    /  DBili  x      /  AST  51     /  ALT  30     /  AlkPhos  120    21 Aug 2023 17:00    PT/INR - ( 23 Aug 2023 06:18 )   PT: 12.9 sec;   INR: 1.11 ratio         PTT - ( 22 Aug 2023 07:33 )  PTT:26.8 sec      12 Lead ECG:   Ventricular Rate 112 BPM    Atrial Rate 112 BPM    P-R Interval 156 ms    QRS Duration 122 ms    Q-T Interval 350 ms    QTC Calculation(Bazett) 477 ms    P Axis 78 degrees    R Axis -47 degrees    T Axis 84 degrees    Diagnosis Line Sinus tachycardia  Nonspecific intraventricular conduction delay  Left axis deviation  Anterior infarct  Abnormal ECG  Confirmed by kedar Toledo ([id number]) on 8/22/2023 4:11:03 PM (08-22-23 @ 12:23)    < from: CT Abdomen and Pelvis w/ IV Cont (08.21.23 @ 17:48) >    ACC: [id number] EXAM:  CT ABDOMEN AND PELVIS IC   ORDERED BY: IRIS REAVES     PROCEDURE DATE:  08/21/2023          INTERPRETATION:  CLINICAL INFORMATION: Left flank pain.    COMPARISON: None.    CONTRAST/COMPLICATIONS:  IV Contrast: Omnipaque 350  90 cc administered   10 cc discarded  Oral Contrast: NONE  Complications: None reported at time of study completion    PROCEDURE:  CT of the Abdomen and Pelvis was performed.  Sagittal and coronal reformats were performed.    FINDINGS:  LOWER CHEST: Total situs inversus.    LIVER: Cirrhosis. A segment 8/4 A exophytic peripherally enhancing   low-density mass extends superiorly to the diaphragm, measuring   approximately 5.2 x 4.1 cm. Additional 5 cm cyst low-density lesions that   are too small to characterize.  BILE DUCTS: Normal caliber.  GALLBLADDER: Within normal limits.  SPLEEN: Within normal limits.  PANCREAS: Within normal limits.  ADRENALS: Within normal limits.  KIDNEYS/URETERS: Within normal limits.    BLADDER: Within normal limits.  REPRODUCTIVE ORGANS: Mildly prominent prostate gland.    BOWEL: No bowel obstruction.  PERITONEUM: Moderate volume ascites with soft tissue implants diffusely   along the peritoneum as well as the serosal surface of the sigmoid colon.   A perisigmoid implant measures approximately 3.9 x 2.8 cm in greatest   transaxial dimension. Soft tissue implants are noted along the falciform   ligament. Omental nodularity is present.  VESSELS: Patent portal, superior mesenteric, and splenic veins. Diffuse   abdominal collaterals..  RETROPERITONEUM/LYMPH NODES: No lymphadenopathy.  ABDOMINAL WALL: Left inguinal hernia containing fluid and fat.  BONES: Degenerative changes.    IMPRESSION: Total situs inversus.  Cirrhosis with a dominant liver mass concerning for primary HCC versus   metastasis.    Peritoneal carcinomatosis and moderate volume ascites.        --- End of Report ---            RASHAUN BARRIENTOS MD; Attending Radiologist  This document has been electronically signed. Aug 21 2023  6:07PM    < end of copied text >

## 2023-08-23 NOTE — DIETITIAN INITIAL EVALUATION ADULT - PROBLEM SELECTOR PLAN 4
On admission patient met sepsis criteria due to hypotension, leukocytosis, tachy and elevated lactate. Unclear source but will cover for SBP  - s/p 2L NS bolus and Rocephin  in ED  - Will continue Rocephin 2g q24hm until cultures come back and ID recommendations   - Lactate 3 on admission, f/u repeat AM lactate  - Trend WBC and monitor for fever  - F/u BCx x2  - ID (Dr. Allred) consulted, f/u recs

## 2023-08-23 NOTE — CHART NOTE - NSCHARTNOTEFT_GEN_A_CORE
RN called for pt with tachycardia. Pt's HR in the 130s, peaked at 140 while ambulating to the bathroom, then returned to low 100s. Pt was asymptomatic. Pt has been tachycardic since admission. Tele called, HR peak while ambulating was non-sustained. Pt con Coreg 3.125mg BID. Cardiology following.     As pt is asymptomatic, no further intervention at this time. RN called for pt with tachycardia. Pt's HR in the 130s, peaked at 140 while ambulating to the bathroom, then returned to low 100s. Pt was asymptomatic. Pt has been tachycardic since admission. Tele called, HR peak while ambulating was non-sustained. Pt has been in sinus rhythm. Pt con Coreg 3.125mg BID. Cardiology following.     As pt is asymptomatic, no further intervention at this time.

## 2023-08-23 NOTE — PROGRESS NOTE ADULT - PROBLEM SELECTOR PLAN 8
Pt with questionable hx of HF, family states unsure hx of HF   - Continue home coreg   - Dced digoxin as per cardio  - Digoxin level WNL   - Hold diuretics due to MAIA and hyponatremia  - F/u TTE  - Cardio Dr. Swift group consulted, f/u recs Pt with questionable hx of HF, family states unsure hx of HF   - Continue home coreg   - Dced digoxin as per cardio  - Digoxin level WNL   - Hold diuretics due to MAIA and hyponatremia  - F/u TTE  - Cardio Dr. Swift group consulted, Pt with questionable hx of HF, family states unsure hx of HF   - Continue home coreg   - Dced digoxin as per cardio  - Digoxin level WNL   - Given lasix 20mg IV x2 for symptomatic ascites   - continue home spironolactone  - Hold home torsemide due to MAIA and hyponatremia  - F/u TTE  - Cardio Dr. Swift group consulted,

## 2023-08-23 NOTE — DIETITIAN INITIAL EVALUATION ADULT - PROBLEM SELECTOR PLAN 1
Chronic, s/p esophageal var[place]   - Used to drink alcohol years ago   - AST 50, ALT and alkaline phosphatase wnl   - CT a/p:  Total situs inversus. Cirrhosis with a dominant liver mass concerning for primary HCC versus metastasis. Peritoneal carcinomatosis and moderate volume ascites. Discussed finding with patient  and family   - GI Dr. Devi consulted, f/u recs  - Hold hepatotoxic meds

## 2023-08-23 NOTE — PROCEDURE NOTE - PROCEDURE FINDINGS AND DETAILS
4750cc of tram ascitic fluid removed from right abdomen under sterile conditions and ultrasound guidance.

## 2023-08-23 NOTE — PROGRESS NOTE ADULT - SUBJECTIVE AND OBJECTIVE BOX
[facility]  Yo Colin PA-C  [address] [place] [address]  [phone]      INTERVAL HPI/OVERNIGHT EVENTS:  Pt s/e with two family members at bedside who translated for pt  Planned for IR paracentesis today  Pt c/o abdominal distention and mild SOB  Otherwise no GI complaints    MEDICATIONS  (STANDING):  carvedilol 3.125 milliGRAM(s) Oral every 12 hours  cefTRIAXone   IVPB 2000 milliGRAM(s) IV Intermittent every 24 hours  dextrose 5%. 1000 milliLiter(s) (50 mL/Hr) IV Continuous <Continuous>  dextrose 5%. 1000 milliLiter(s) (100 mL/Hr) IV Continuous <Continuous>  dextrose 50% Injectable 25 Gram(s) IV Push once  dextrose 50% Injectable 12.5 Gram(s) IV Push once  dextrose 50% Injectable 25 Gram(s) IV Push once  glucagon  Injectable 1 milliGRAM(s) IntraMuscular once  insulin glargine Injectable (LANTUS) 5 Unit(s) SubCutaneous every morning  insulin lispro (ADMELOG) corrective regimen sliding scale   SubCutaneous at bedtime  insulin lispro (ADMELOG) corrective regimen sliding scale   SubCutaneous three times a day before meals  insulin lispro Injectable (ADMELOG) 3 Unit(s) SubCutaneous before breakfast  insulin lispro Injectable (ADMELOG) 3 Unit(s) SubCutaneous before dinner  insulin lispro Injectable (ADMELOG) 3 Unit(s) SubCutaneous before lunch  phenytoin   Capsule 100 milliGRAM(s) Oral daily  rifAXIMin 550 milliGRAM(s) Oral two times a day  spironolactone 25 milliGRAM(s) Oral daily    MEDICATIONS  (PRN):  albumin human 25% IVPB 50 milliLiter(s) IV Intermittent once PRN paracentesis  albuterol/ipratropium for Nebulization 3 milliLiter(s) Nebulizer every 6 hours PRN Shortness of Breath and/or Wheezing  melatonin 3 milliGRAM(s) Oral at bedtime PRN Insomnia      Allergies    No Known Allergies    PHYSICAL EXAM:   Vital Signs:  Vital Signs Last 24 Hrs  T(C): 36.3 (23 Aug 2023 11:24), Max: 36.9 (22 Aug 2023 11:58)  T(F): 97.3 (23 Aug 2023 11:24), Max: 98.5 (22 Aug 2023 11:58)  HR: 113 (23 Aug 2023 11:24) (109 - 125)  BP: 107/75 (23 Aug 2023 11:24) (107/74 - 135/84)  BP(mean): --  RR: 18 (23 Aug 2023 11:24) (18 - 18)  SpO2: 96% (23 Aug 2023 11:24) (93% - 97%)    Parameters below as of 23 Aug 2023 11:24  Patient On (Oxygen Delivery Method): nasal cannula  O2 Flow (L/min): 2    Daily     Daily Weight in k.9 (23 Aug 2023 04:23)    GENERAL:  Appears stated age  HEENT:  NC/AT  CHEST:  Full & symmetric excursion  HEART:  Regular rhythm  ABDOMEN:  Soft, non-tender, +distended  EXTEREMITIES:  no cyanosis  SKIN:  No rash  NEURO:  Alert      LABS:                        14.6   12.47 )-----------( 536      ( 23 Aug 2023 06:18 )             44.1     08-23    125<L>  |  98  |  32<H>  ----------------------------<  171<H>  5.5<H>   |  19<L>  |  1.70<H>    Ca    8.5      23 Aug 2023 06:18  Phos  3.7     08-23  Mg     2.3     08-    TPro  7.8  /  Alb  2.1<L>  /  TBili  0.8  /  DBili  x   /  AST  57<H>  /  ALT  36  /  AlkPhos  138<H>  08-23    PT/INR - ( 23 Aug 2023 06:18 )   PT: 12.9 sec;   INR: 1.11 ratio         PTT - ( 22 Aug 2023 07:33 )  PTT:26.8 sec  Urinalysis Basic - ( 23 Aug 2023 06:18 )    Color: x / Appearance: x / SG: x / pH: x  Gluc: 171 mg/dL / Ketone: x  / Bili: x / Urobili: x   Blood: x / Protein: x / Nitrite: x   Leuk Esterase: x / RBC: x / WBC x   Sq Epi: x / Non Sq Epi: x / Bacteria: x

## 2023-08-23 NOTE — PROGRESS NOTE ADULT - ASSESSMENT
68 M with End Stage Liver Disease/Decompensated Cirrhosis   now wtih Ascites/Liver Mass/?Peritoneal Carcinomatosis    IVAB for SBP prophylaxis  Oncology Eval  IR for paracentesis  fluid studies ordered including cytopathology  AFP wnl  xifaxin 550 mg bid  renal eval  continue carvedilol    I reviewed the overnight course of events on the unit, re-confirming the patient history. I discussed the care with the patient and their family  Differential diagnosis and plan of care discussed with patient after the evaluation  40 minutes spent on total encounter of which more than fifty percent of the encounter was spent counseling and/or coordinating care by the attending physician.  Advanced care planning was discussed with patient and family.  Advanced care planning forms were reviewed and discussed.  Risks, benefits and alternatives of gastroenterologic procedures were discussed in detail and all questions were answered.

## 2023-08-23 NOTE — PROGRESS NOTE ADULT - ASSESSMENT
69 y/o Mele-speaking, refused , Son Ang translated at bedside. Patient has history of liver cirrhosis s/p esophageal varices banding, situs inversus, HF?, bradycardia, HTN, HLD, T2DM, seizure, GERD presents to ED due to subjective fever for the past 4-5 days associated with weakness, malaise, poor appetite and abdominal bloating, symptoms kept persistently worsening and yesterday he started to have LUQ pain,    wheezing  HTN  DM  Cirrhosis  GERD  Liver mass  esoph varices    tachy overnight  GOC documented - Full Code  VS noted  Cardio f/u  on emp ABX    ct imaging reviewed  GI eval  cirrhosis - liver mass  MAIA - I and O - serial labs - replete lytes  lung bases - clear -   wheezing - episodic - on NEBS prn  monitor VS and HD and Sat  GOC discussion  DM care  serial FS  prognosis guarded  emp ABX in progress - eval for SBP

## 2023-08-23 NOTE — DIETITIAN INITIAL EVALUATION ADULT - PROBLEM SELECTOR PLAN 2
- CT a/p: Highly suspicious for primary HCC versus metastasis. Peritoneal carcinomatosis and moderate volume ascites.  - Heme/Onc Dr. Moy ang consulted, f/u recs  - GI Dr. Devi consulted, f/u recs

## 2023-08-23 NOTE — PROGRESS NOTE ADULT - PROBLEM SELECTOR PLAN 7
Chronic, non- insulin dependent   - In ED: Glu 450 on admission, downtrended to 269 s/p Humulin 5U  - Hgb A1C 9.4%  - Hold home oral meds  - Moderate dose insulin corrective scale  - Continue Lispro 3 units pre-meals and Lantus 5 AM   - Endo Dr. Perlman consulted, f/u recs   - Hypoglycemia protocol, fingerstick glucose QAC&HS Chronic, non- insulin dependent   - In ED: Glu 450 on admission, downtrended to 269 s/p Humulin 5U  - Hgb A1C 9.4%  - Hold home oral meds  - Moderate dose insulin corrective scale  - Continue Lispro 3U pre-meals and Lantus 5U AM   - Endo Dr. Perlman consulted, f/u recs   - Hypoglycemia protocol, fingerstick glucose QAC&HS

## 2023-08-23 NOTE — DIETITIAN INITIAL EVALUATION ADULT - PROBLEM SELECTOR PLAN 7
Chronic, non- insulin dependent   - presents with hyperglycemia, glu 450 on admission, downtrended to 269 s/p Humulin 5 units   - Hold home oral meds  - Moderate dose insulin corrective scale  - Started on lispro 3 units pre meals and Lantus 5 AM   - Endo Dr. Perlman consulted   - Hypoglycemia protocol, fingerstick glucose QAC&HS   - F/u AM HbA1c

## 2023-08-23 NOTE — PROGRESS NOTE ADULT - SUBJECTIVE AND OBJECTIVE BOX
Date/Time Patient Seen:  		  Referring MD:   Data Reviewed	       Patient is a 68y old  Male who presents with a chief complaint of Liver Cirrhosis with ascites, Liver cancer, hyponatremia (22 Aug 2023 12:44)      Subjective/HPI     PAST MEDICAL & SURGICAL HISTORY:  T2DM (type 2 diabetes mellitus)    History of seizure    History of bradycardia    GERD (gastroesophageal reflux disease)    History of cirrhosis of liver    HLD (hyperlipidemia)    HTN (hypertension)    H/O esophageal varices          Medication list         MEDICATIONS  (STANDING):  carvedilol 3.125 milliGRAM(s) Oral every 12 hours  cefTRIAXone   IVPB 2000 milliGRAM(s) IV Intermittent every 24 hours  dextrose 5%. 1000 milliLiter(s) (50 mL/Hr) IV Continuous <Continuous>  dextrose 5%. 1000 milliLiter(s) (100 mL/Hr) IV Continuous <Continuous>  dextrose 50% Injectable 25 Gram(s) IV Push once  dextrose 50% Injectable 12.5 Gram(s) IV Push once  dextrose 50% Injectable 25 Gram(s) IV Push once  glucagon  Injectable 1 milliGRAM(s) IntraMuscular once  insulin glargine Injectable (LANTUS) 5 Unit(s) SubCutaneous every morning  insulin lispro (ADMELOG) corrective regimen sliding scale   SubCutaneous three times a day before meals  insulin lispro (ADMELOG) corrective regimen sliding scale   SubCutaneous at bedtime  insulin lispro Injectable (ADMELOG) 3 Unit(s) SubCutaneous before breakfast  insulin lispro Injectable (ADMELOG) 3 Unit(s) SubCutaneous before dinner  insulin lispro Injectable (ADMELOG) 3 Unit(s) SubCutaneous before lunch  phenytoin   Capsule 100 milliGRAM(s) Oral daily  rifAXIMin 550 milliGRAM(s) Oral two times a day    MEDICATIONS  (PRN):  albuterol/ipratropium for Nebulization 3 milliLiter(s) Nebulizer every 6 hours PRN Shortness of Breath and/or Wheezing  melatonin 3 milliGRAM(s) Oral at bedtime PRN Insomnia         Vitals log        ICU Vital Signs Last 24 Hrs  T(C): 36.4 (23 Aug 2023 04:23), Max: 36.9 (22 Aug 2023 11:58)  T(F): 97.6 (23 Aug 2023 04:23), Max: 98.5 (22 Aug 2023 11:58)  HR: 125 (23 Aug 2023 04:23) (109 - 125)  BP: 135/84 (23 Aug 2023 04:23) (106/74 - 135/84)  BP(mean): --  ABP: --  ABP(mean): --  RR: 18 (23 Aug 2023 04:23) (18 - 18)  SpO2: 97% (23 Aug 2023 04:23) (93% - 97%)    O2 Parameters below as of 23 Aug 2023 04:23  Patient On (Oxygen Delivery Method): room air                 Input and Output:  I&O's Detail      Lab Data                        12.9   14.06 )-----------( 459      ( 22 Aug 2023 07:33 )             38.3     08-22    128<L>  |  100  |  26<H>  ----------------------------<  150<H>  4.8   |  22  |  1.40<H>    Ca    8.1<L>      22 Aug 2023 07:33    TPro  7.4  /  Alb  2.1<L>  /  TBili  0.6  /  DBili  x   /  AST  51<H>  /  ALT  32  /  AlkPhos  110  08-22            Review of Systems	      Objective     Physical Examination    heart s1s2  lung dc BS  head nc      Pertinent Lab findings & Imaging      Austyn:  NO   Adequate UO     I&O's Detail           Discussed with:     Cultures:	        Radiology

## 2023-08-23 NOTE — DIETITIAN NUTRITION RISK NOTIFICATION - TREATMENT: THE FOLLOWING DIET HAS BEEN RECOMMENDED
Diet, Regular:   Consistent Carbohydrate {Evening Snack}  DASH/TLC {Sodium & Cholesterol Restricted}  No Beef (08-21-23 @ 19:46) [Active]    SF magic Cup BID

## 2023-08-23 NOTE — CONSULT NOTE ADULT - SUBJECTIVE AND OBJECTIVE BOX
Patient is a 68y old  Male who presents with a chief complaint of Liver Cirrhosis with ascites, Liver cancer, hyponatremia (23 Aug 2023 11:55)      Reason For Consult:     HPI:  67 y/o Meel-speaking, refused , Son Ang translated at bedside. Patient has history of liver cirrhosis s/p esophageal varices banding, situs inversus, HF?, bradycardia, HTN, HLD, T2DM, seizure, GERD presents to ED due to subjective fever for the past 4-5 days associated with weakness, malaise, poor appetite and abdominal bloating, symptoms kept persistently worsening and yesterday he started to have LUQ pain, describes pain as dull/cramping in nature, intermittent, no radiation, Pt also notes one episode of bloody stool with a little bright red blood 2 days ago. Patient lives in [facility] [place], full independent in ADL's, denies recent unintentional weight loss, nausea, vomiting, urinary symptoms, or other symptoms at this time     ED course:  Vitals: BP: 87/60-> 113/92, HR: 96, Temp: 97.9, RR: 18, SpO2: 98% on RA  Labs significant for: WBC 14.02, Hgb 12.9, Plt 471, Na 122, Cl 93, BUN 25, Cr 1.8, Glu 450, Alb 2.1, AST 51, eGFR 41, Lactate 3  UA: +glu >1000  CT a/p:  Total situs inversus. Cirrhosis with a dominant liver mass concerning for primary HCC versus metastasis. Peritoneal carcinomatosis and moderate volume ascites.  In ED given Rocephin 1g x1, Humulin 5u x1, NS bolus 1L x2 (21 Aug 2023 18:33)      PAST MEDICAL & SURGICAL HISTORY:  T2DM (type 2 diabetes mellitus)      History of seizure      History of bradycardia      GERD (gastroesophageal reflux disease)      History of cirrhosis of liver      HLD (hyperlipidemia)      HTN (hypertension)      H/O esophageal varices          FAMILY HISTORY:  FH: type 2 diabetes (Mother)          Social History:    MEDICATIONS  (STANDING):  carvedilol 3.125 milliGRAM(s) Oral every 12 hours  cefTRIAXone   IVPB 2000 milliGRAM(s) IV Intermittent every 24 hours  dextrose 5%. 1000 milliLiter(s) (50 mL/Hr) IV Continuous <Continuous>  dextrose 5%. 1000 milliLiter(s) (100 mL/Hr) IV Continuous <Continuous>  dextrose 50% Injectable 12.5 Gram(s) IV Push once  dextrose 50% Injectable 25 Gram(s) IV Push once  dextrose 50% Injectable 25 Gram(s) IV Push once  glucagon  Injectable 1 milliGRAM(s) IntraMuscular once  insulin glargine Injectable (LANTUS) 5 Unit(s) SubCutaneous every morning  insulin lispro (ADMELOG) corrective regimen sliding scale   SubCutaneous three times a day before meals  insulin lispro (ADMELOG) corrective regimen sliding scale   SubCutaneous at bedtime  insulin lispro Injectable (ADMELOG) 3 Unit(s) SubCutaneous before breakfast  insulin lispro Injectable (ADMELOG) 3 Unit(s) SubCutaneous before dinner  insulin lispro Injectable (ADMELOG) 3 Unit(s) SubCutaneous before lunch  phenytoin   Capsule 100 milliGRAM(s) Oral daily  rifAXIMin 550 milliGRAM(s) Oral two times a day  spironolactone 25 milliGRAM(s) Oral daily    MEDICATIONS  (PRN):  albumin human 25% IVPB 50 milliLiter(s) IV Intermittent once PRN paracentesis  albuterol/ipratropium for Nebulization 3 milliLiter(s) Nebulizer every 6 hours PRN Shortness of Breath and/or Wheezing  melatonin 3 milliGRAM(s) Oral at bedtime PRN Insomnia        T(C): 36.4 (08-23-23 @ 12:10), Max: 36.5 (08-22-23 @ 20:36)  HR: 106 (08-23-23 @ 12:50) (106 - 125)  BP: 116/77 (08-23-23 @ 12:50) (107/75 - 143/70)  RR: 20 (08-23-23 @ 12:50) (18 - 23)  SpO2: 99% (08-23-23 @ 12:50) (96% - 99%)  Wt(kg): --    PHYSICAL EXAM:  GENERAL: NAD, well-groomed, well-developed  HEAD:  Atraumatic, Normocephalic  NECK: Supple, No JVD, Normal thyroid  CHEST/LUNG: Clear to percussion bilaterally; No rales, rhonchi, wheezing, or rubs  HEART: Regular rate and rhythm; No murmurs, rubs, or gallops  ABDOMEN: Soft, Nontender, Nondistended; Bowel sounds present  EXTREMITIES:  2+ Peripheral Pulses, No clubbing, cyanosis, or edema  SKIN: No rashes or lesions    CAPILLARY BLOOD GLUCOSE      POCT Blood Glucose.: 132 mg/dL (23 Aug 2023 11:22)  POCT Blood Glucose.: 151 mg/dL (23 Aug 2023 07:45)  POCT Blood Glucose.: 210 mg/dL (22 Aug 2023 21:56)  POCT Blood Glucose.: 146 mg/dL (22 Aug 2023 17:06)                            14.6   12.47 )-----------( 536      ( 23 Aug 2023 06:18 )             44.1       CMP:  08-23 @ 06:18  SGPT 36  Albumin 2.1   Alk Phos 138   Anion Gap 8   SGOT 57   Total Bili 0.8   BUN 32   Calcium Total 8.5   CO2 19   Chloride 98   Creatinine 1.70   eGFR if AA --   eGFR if non AA --   Glucose 171   Potassium 5.5   Protein 7.8   Sodium 125      Thyroid Function Tests:  08-22 @ 07:33 TSH 1.56 FreeT4 -- T3 -- Anti TPO -- Anti Thyroglobulin Ab -- TSI --      Diabetes Tests:       Radiology:

## 2023-08-23 NOTE — DIETITIAN INITIAL EVALUATION ADULT - PROBLEM SELECTOR PLAN 5
MAIA  in the setting of dehydration  - Cr 1.8 on admission  - Baseline creatinine unknown   - s/p 2L NS bolus in ED  - hold further fluid for now  - Monitor BMP  - Avoid nephrotoxic medications: hold home torsemide, spironolactone, and rampiril  - Monitor renal indices  - Nephro Dr. Harper consulted, f/u recs

## 2023-08-23 NOTE — PROGRESS NOTE ADULT - PROBLEM SELECTOR PLAN 5
MAIA  in the setting of dehydration  - Baseline creatinine unknown   - Cr 1.8 on admission --> 1.7 today  - S/p 2L NS bolus in ED  - Hold further fluids for now  - Monitor BMP  - Avoid nephrotoxic medications: hold home torsemide and rampiril  - Monitor renal indices  - Nephro Dr. Harper following, f/u recs MAIA  in the setting of dehydration  - Baseline creatinine unknown   - Cr 1.8 on admission --> 1.7 today  - S/p 2L NS bolus in ED  - Hold further fluids for now  - Monitor BMP  - Avoid nephrotoxic medications: hold home torsemide and rampiril  - Monitor renal indices  - Nephro Dr. Harper following,

## 2023-08-23 NOTE — PROGRESS NOTE ADULT - ASSESSMENT
69 yo Mele-speaking male PMH of liver cirrhosis s/p esophageal varices banding, situs inversus, HF?, bradycardia, HTN, HLD, T2DM, seizure disorder, GERD presents to ED for 4-5 days of subjective fever, weakness, malaise, decreased appetite, abd bloating and LUQ pain. Admitted for liver cirrhosis with ascites, concerning for HCC, complicated with sepsis possible secondary to SBP and electrolyte imbalance/hyponatremia.

## 2023-08-23 NOTE — PROVIDER CONTACT NOTE (OTHER) - REASON
Pt HR elevated to 130-140 bpm when ambulating
Pt had 11 beats of Vtach followed by 14 beats of VTach

## 2023-08-23 NOTE — PROGRESS NOTE ADULT - PROBLEM SELECTOR PLAN 11
Chronic, however on admission presented with hypotension SBP the 80's that improved with IVF   - Continue home coreg with holding parameters   - Hold home ramipril in setting of MAIA and hyponatremia    #HLD  Chronic  - Hold home statins due to transaminitis Chronic, however on admission presented with hypotension SBP the 80's that improved with IVF   - Continue home coreg with holding parameters   - Hold home torsemide and ramipril in setting of MAIA and hyponatremia    #HLD  Chronic  - Hold home statins due to transaminitis

## 2023-08-23 NOTE — PROGRESS NOTE ADULT - ASSESSMENT
Hyponatremia: Hypovolemic  MAIA, CKD 3: prerenal azotemia  Cirrhosis, Liver mass, Peritoneal carcinomatosis, Ascites  Hypertension    08/22/23: Improving sodium levels and renal indices. Gentle IV hydration. Monitor blood sugar levels. Insulin coverage as needed.   Malignancy work up. D/w patients family at bedside. Will follow electrolytes and renal function trend.   08/23/23: Sodium trending down. For IR paracentesis. Received IV lasix for SOB. Maintain PO fluid restriction. Overall prognosis poor. Low potassium diet. PRN lokelma if potassium trending up.  Hyponatremia: Hypovolemic  MAIA, CKD 3: prerenal azotemia  Cirrhosis, Liver mass, Peritoneal carcinomatosis, Ascites  Hypertension    08/22/23: Improving sodium levels and renal indices. Gentle IV hydration. Monitor blood sugar levels. Insulin coverage as needed.   Malignancy work up. D/w patients family at bedside. Will follow electrolytes and renal function trend.   08/23/23: Sodium trending down. For IR paracentesis. Received IV lasix for SOB. Maintain PO fluid restriction. Overall prognosis poor. Low potassium diet. PRN lokelma if potassium trending up.   D/w pt's son at bedside

## 2023-08-23 NOTE — DIETITIAN INITIAL EVALUATION ADULT - NUTRITIONGOAL OUTCOME1
po intake >65% served at meals and supplements ( son states can try pt on sugar free magic cup BID - each 4 oz serv provides 270 kcals and 9 gm pro)

## 2023-08-23 NOTE — DIETITIAN INITIAL EVALUATION ADULT - PROBLEM SELECTOR PLAN 3
Na 122 on admission  - s/p 2L NS bolus in ED  - F/up stat BMP   - hold further fluid for now  - hold home torsemide, spironolactone, and ramipril  - monitor renal indices  - Nephro Dr. Harper consulted, f/u recs

## 2023-08-23 NOTE — PROGRESS NOTE ADULT - PROBLEM SELECTOR PLAN 12
Concern for starting A/C on admission as pt reported hematochezia  - H/H WNL   - SCDs for now and encourage ambulation  - Hold A/C for paracentesis, may start lovenox if H/H stable tomorrow (in setting of hematochezia)     #GOC discussion - full code Concern for starting A/C on admission as pt reported hematochezia  - H/H WNL   - SCDs for now and encourage ambulation  - Hold A/C for paracentesis, may start Lovenox if H/H stable tomorrow (in setting of hematochezia)     #GOC discussion - full code

## 2023-08-23 NOTE — PROGRESS NOTE ADULT - PROBLEM SELECTOR PLAN 4
On admission patient met sepsis criteria due to hypotension, leukocytosis, tachy and elevated lactate. Unclear source but will cover for SBP  - S/p 2L NS bolus and Rocephin in ED  - Continue Rocephin 2g q24hm to cover SBP as per ID   - Lactate 3 on admission --> repeat lactate WNL   - BCx x2 - NGTD   - Trend WBC and monitor for fever  - ID Dr. Allred consulted, f/u recs On admission patient met sepsis criteria due to hypotension, leukocytosis, tachy and elevated lactate. Unclear source possible  SBP  - S/p 2L NS bolus and Rocephin in ED  - Continue Rocephin 2g q24hm to cover SBP as per ID   - Lactate 3 on admission --> repeat lactate WNL   - BCx x2 - NGTD   - Trend WBC and monitor for fever  - ID Dr. Allred consulted, On admission patient met sepsis criteria due to hypotension, leukocytosis, tachy and elevated lactate. Unclear source possible  SBP  - S/p 2L NS bolus and Rocephin in ED  - Continue Rocephin 2g q24hm to cover SBP as per ID   - Lactate 3 on admission --> repeat lactate WNL   - f/u blood culture  - Trend WBC and monitor for fever  - ID Dr. Allred consulted,

## 2023-08-23 NOTE — PROGRESS NOTE ADULT - PROBLEM SELECTOR PLAN 1
Chronic, s/p esophageal [place]ices [place]   - Used to drink alcohol years ago   - AST 50 and Alk phos 138; ALT WNL  - CT A/P: Total situs inversus. Cirrhosis with a dominant liver mass concerning for primary HCC versus metastasis. Peritoneal carcinomatosis and moderate volume ascites.  - Paracentesis today with IR as per GI  - F/u IR peritoneal fluid culture, PMN count, and protein level    - Hypoalbuminemia at 2.1 - gave albumin IVPB before paracentesis   - Hyperkalemia at 5.5 - f/u potassium STAT  - Continue rifaximin 550mg BID as per GI  - Started lasix and spironolactone  - Monitor lytes and replete as needed   - GI Dr. Devi consulted, recs appreciated  - Hold hepatotoxic meds Chronic, s/p esophageal varices banding in his country [facility]    - Used to drink alcohol years ago   - AST 50 and Alk phos 138; ALT WNL  - CT A/P: Total situs inversus. Cirrhosis with a dominant liver mass concerning for primary HCC versus metastasis. Peritoneal carcinomatosis and moderate volume ascites.  - Paracentesis today with IR as per GI  - F/u IR peritoneal fluid culture, PMN count, and protein level    - Hypoalbuminemia at 2.1 - gave albumin IVPB before paracentesis   - Hyperkalemia at 5.5 - f/u potassium STAT  - Continue rifaximin 550mg BID as per GI  - Started Lasix and spironolactone  - Monitor lytes and replete as needed   - GI Dr. Devi consulted,   - Hold hepatotoxic meds Chronic, s/p esophageal varices banding in his country [facility]    - Used to drink alcohol years ago   - AST 50 and Alk phos 138; ALT WNL  - CT A/P: Total situs inversus. Cirrhosis with a dominant liver mass concerning for primary HCC versus metastasis. Peritoneal carcinomatosis and moderate volume ascites.  - IR paracentesis on 8/23 - 4750cc of tram ascitic fluid removed from right abdomen  - F/u IR peritoneal fluid culture, PMN count, and protein level    - Hypoalbuminemia at 2.1 - gave albumin IVPB before paracentesis   - Continue rifaximin 550mg BID as per GI  - Given lasix 20mg IV x2 for ascites - improvement  - Started spironolactone (home med)  - Monitor lytes and replete as needed   - GI Dr. Devi consulted,   - Hold hepatotoxic meds

## 2023-08-23 NOTE — DIETITIAN INITIAL EVALUATION ADULT - PROBLEM SELECTOR PLAN 6
Patient reports one episode of red bright blood in the stools, denies history of hemorrhoids.   - F/up FOBT   - GI consulted   - Monitor Hb   - On PPI

## 2023-08-23 NOTE — DIETITIAN INITIAL EVALUATION ADULT - PROBLEM SELECTOR PLAN 8
Pt with questionable hx of HF, family states unsure hx of HF   - continue home coreg and digoxin with hold parameters  - hold diuretics due to MAIA and hyponatremia  - f/u TTE  - Cardio Dr. Swift group consulted, f/u recs

## 2023-08-23 NOTE — DIETITIAN INITIAL EVALUATION ADULT - PERTINENT MEDS FT
MEDICATIONS  (STANDING):  carvedilol 3.125 milliGRAM(s) Oral every 12 hours  cefTRIAXone   IVPB 2000 milliGRAM(s) IV Intermittent every 24 hours  dextrose 5%. 1000 milliLiter(s) (50 mL/Hr) IV Continuous <Continuous>  dextrose 5%. 1000 milliLiter(s) (100 mL/Hr) IV Continuous <Continuous>  dextrose 50% Injectable 12.5 Gram(s) IV Push once  dextrose 50% Injectable 25 Gram(s) IV Push once  dextrose 50% Injectable 25 Gram(s) IV Push once  glucagon  Injectable 1 milliGRAM(s) IntraMuscular once  insulin glargine Injectable (LANTUS) 5 Unit(s) SubCutaneous every morning  insulin lispro (ADMELOG) corrective regimen sliding scale   SubCutaneous three times a day before meals  insulin lispro (ADMELOG) corrective regimen sliding scale   SubCutaneous at bedtime  insulin lispro Injectable (ADMELOG) 3 Unit(s) SubCutaneous before breakfast  insulin lispro Injectable (ADMELOG) 3 Unit(s) SubCutaneous before dinner  insulin lispro Injectable (ADMELOG) 3 Unit(s) SubCutaneous before lunch  phenytoin   Capsule 100 milliGRAM(s) Oral daily  rifAXIMin 550 milliGRAM(s) Oral two times a day    MEDICATIONS  (PRN):  albuterol/ipratropium for Nebulization 3 milliLiter(s) Nebulizer every 6 hours PRN Shortness of Breath and/or Wheezing  melatonin 3 milliGRAM(s) Oral at bedtime PRN Insomnia

## 2023-08-23 NOTE — PROGRESS NOTE ADULT - ASSESSMENT
68y old male who presents with a chief complaint of Liver Cirrhosis with ascites, Liver cancer, and hyponatremia.     CHF  - c/o SOB this AM, he appears volume overload on exam, tachypneic + rales,   on tele   - with known hx  HFrEF per family at bedside, does not have any providers here as he resides in [facility]   - would give Lasix total 40 mg IVP , will reassess  - would obtain CT chest   - Results of previous TTE not available. Family reports LVEF of 25-30% in 2020.  - f/u TTE  - Hold home medications Torsemide 10mg, Spironolactone 50mg due to MAIA  - Patient is on digoxin 0.125mg on M-F for HF, denies any hx of arrhythmia or Afib.  - digoxin level : 0.8 (8/22)     - No clear evidence of acute ischemia.  - Repeat EKG with leads in positions appropriate for situs inversus.  - Hold home medication atorvastatin 10mg in the setting of liver cirrhosis  - planned for Paracentesis with IR, GI following     - BP controlled, monitor routine hemodynamics.  - Continue Coreg 3.125mg BID, with hold parameters   - Hold home medications Ramipril 2.5mg due to MAIA  - Monitor and replete Lytes. Keep K > 4 and Mg > 2    - Will continue to follow.    ENRIQUE Coronel  Nurse Practitioner - Cardiology   call TEAMS

## 2023-08-23 NOTE — DIETITIAN INITIAL EVALUATION ADULT - OTHER INFO
69 y/o Mele-speaking, refused , Son Ang translated at bedside. Patient has history of liver cirrhosis s/p esophageal varices banding, situs inversus, HF?, bradycardia, HTN, HLD, T2DM, seizure, GERD presents to ED due to subjective fever for the past 4-5 days associated with weakness, malaise, poor appetite and abdominal bloating, symptoms kept persistently worsening and yesterday he started to have LUQ pain, describes pain as dull/cramping in nature, intermittent, no radiation, Pt also notes one episode of bloody stool with a little bright red blood 2 days ago. Patient lives in [facility] came [place], full independent in ADL's, denies recent unintentional weight loss, nausea, vomiting, urinary symptoms, or other symptoms at this time    67 y/o Mele-speaking, refused , Son Ang translated at bedside. Patient has history of liver cirrhosis s/p esophageal varices banding, situs inversus, HF?, bradycardia, HTN, HLD, T2DM, seizure, GERD presents to ED due to subjective fever for the past 4-5 days associated with weakness, malaise, poor appetite and abdominal bloating, symptoms kept persistently worsening and yesterday he started to have LUQ pain, describes pain as dull/cramping in nature, intermittent, no radiation, Pt also notes one episode of bloody stool with a little bright red blood 2 days ago. Patient lives in [facility] came to [place] only to visit family, full independent in ADL's, denies recent unintentional weight loss, nausea, vomiting, urinary symptoms, or other symptoms at this time .  Pts son in attendance ( pt prefers son answer questions rather than use  services) Pt is 5'8-5'9" always slender 54-55 kg ( 118.8-121#) . Pt has been eating poorly over the last week, has been in the [place] 4 weeks visiting family. Pt cannot chew green salads well ( will delete from meal plan) .  see above for what else pt does not eat.   Pt went for paracentesis today and had 4.75 liters of ascitic fluid removed

## 2023-08-23 NOTE — PROGRESS NOTE ADULT - SUBJECTIVE AND OBJECTIVE BOX
Patient is a 68y old  Male who presents with a chief complaint of Liver Cirrhosis with ascites, Liver cancer, hyponatremia (23 Aug 2023 11:55)      Subjective:  INTERVAL HPI/OVERNIGHT EVENTS: Patient seen and examined at bedside. Patient is Mele speaking,  ID [id number]. Patient had one episode of tachycardia to 130-140 overnight while ambulating to the bathroom. Patient denied all symptoms related to the episode including chest pain and SOB. Patient's HR is currently in the 110s. Patient endorses LUQ abdominal pain that is dull in nature. He had one BM in the morning with blood. Patient states new onset SOB that started earlier in the morning likely secondary to the ascites. He was given a dose of Lasix with improvement in symptoms. Patient still reports L sided chest/rib pain which has remained unchanged from yesterday. Patient also reports lack of appetite and weakness that have remained unchanged since admission. Denies fevers, chills, headache, lightheadedness, palpitations, nausea, vomiting, diarrhea/constipation.      MEDICATIONS  (STANDING):  carvedilol 3.125 milliGRAM(s) Oral every 12 hours  cefTRIAXone   IVPB 2000 milliGRAM(s) IV Intermittent every 24 hours  dextrose 5%. 1000 milliLiter(s) (50 mL/Hr) IV Continuous <Continuous>  dextrose 5%. 1000 milliLiter(s) (100 mL/Hr) IV Continuous <Continuous>  dextrose 50% Injectable 25 Gram(s) IV Push once  dextrose 50% Injectable 12.5 Gram(s) IV Push once  dextrose 50% Injectable 25 Gram(s) IV Push once  glucagon  Injectable 1 milliGRAM(s) IntraMuscular once  insulin glargine Injectable (LANTUS) 5 Unit(s) SubCutaneous every morning  insulin lispro (ADMELOG) corrective regimen sliding scale   SubCutaneous three times a day before meals  insulin lispro (ADMELOG) corrective regimen sliding scale   SubCutaneous at bedtime  insulin lispro Injectable (ADMELOG) 3 Unit(s) SubCutaneous before breakfast  insulin lispro Injectable (ADMELOG) 3 Unit(s) SubCutaneous before dinner  insulin lispro Injectable (ADMELOG) 3 Unit(s) SubCutaneous before lunch  phenytoin   Capsule 100 milliGRAM(s) Oral daily  rifAXIMin 550 milliGRAM(s) Oral two times a day  spironolactone 25 milliGRAM(s) Oral daily    MEDICATIONS  (PRN):  albumin human 25% IVPB 50 milliLiter(s) IV Intermittent once PRN paracentesis  albuterol/ipratropium for Nebulization 3 milliLiter(s) Nebulizer every 6 hours PRN Shortness of Breath and/or Wheezing  melatonin 3 milliGRAM(s) Oral at bedtime PRN Insomnia      Allergies    No Known Allergies    Intolerances        REVIEW OF SYSTEMS:  CONSTITUTIONAL: (+) Lack of appetite. No fever or chills  HEENT:  No headache, no sore throat  RESPIRATORY: (+) SOB. No cough or wheezing  CARDIOVASCULAR: (+) Mild L sided lower chest pain. No palpitations  GASTROINTESTINAL: (+) LUQ abdominal pain and hematochezia. No nausea, vomiting, or diarrhea  GENITOURINARY: No dysuria, frequency, or hematuria  NEUROLOGICAL: (+) Generalized weakness. No dizziness  MUSCULOSKELETAL: no myalgias       Objective:  Vital Signs Last 24 Hrs  T(C): 36.3 (23 Aug 2023 11:24), Max: 36.5 (22 Aug 2023 20:36)  T(F): 97.3 (23 Aug 2023 11:24), Max: 97.7 (22 Aug 2023 20:36)  HR: 113 (23 Aug 2023 11:24) (113 - 125)  BP: 107/75 (23 Aug 2023 11:24) (107/75 - 135/84)  BP(mean): --  RR: 18 (23 Aug 2023 11:24) (18 - 18)  SpO2: 96% (23 Aug 2023 11:24) (96% - 97%)    Parameters below as of 23 Aug 2023 11:24  Patient On (Oxygen Delivery Method): nasal cannula  O2 Flow (L/min): 2      GENERAL: NAD, lying in bed with increased work of breathing   HEAD:  Atraumatic, Normocephalic  EYES: EOMI, PERRLA, +scleral icterus noted b/l  ENT: Moist oral mucous   NECK: Supple, No JVD  CHEST/LUNG: Tenderness to palpation over L lower chest/ribs at the level of xiphoid process. Clear to auscultation bilaterally. +Expiratory wheezes b/l  HEART: S1,S2. Regular rate and rhythm. No murmurs  ABDOMEN: BSx4. Soft, tenderness to palpation in LUQ. Distended (more than yesterday). No rebound or guarding   EXTREMITIES: No clubbing, cyanosis, or edema  NERVOUS SYSTEM:  Alert & Oriented X3, speech clear. No acute deficit. Non focal         LABS:                        14.6   12.47 )-----------( 536      ( 23 Aug 2023 06:18 )             44.1     CBC Full  -  ( 23 Aug 2023 06:18 )  WBC Count : 12.47 K/uL  Hemoglobin : 14.6 g/dL  Hematocrit : 44.1 %  Platelet Count - Automated : 536 K/uL  Mean Cell Volume : 81.8 fl  Mean Cell Hemoglobin : 27.1 pg  Mean Cell Hemoglobin Concentration : 33.1 gm/dL  Auto Neutrophil # : 8.86 K/uL  Auto Lymphocyte # : 1.65 K/uL  Auto Monocyte # : 1.42 K/uL  Auto Eosinophil # : 0.31 K/uL  Auto Basophil # : 0.15 K/uL  Auto Neutrophil % : 71.1 %  Auto Lymphocyte % : 13.2 %  Auto Monocyte % : 11.4 %  Auto Eosinophil % : 2.5 %  Auto Basophil % : 1.2 %    23 Aug 2023 06:18    125    |  98     |  32     ----------------------------<  171    5.5     |  19     |  1.70     Ca    8.5        23 Aug 2023 06:18  Phos  3.7       23 Aug 2023 06:18  Mg     2.3       23 Aug 2023 06:18    TPro  7.8    /  Alb  2.1    /  TBili  0.8    /  DBili  x      /  AST  57     /  ALT  36     /  AlkPhos  138    23 Aug 2023 06:18    PT/INR - ( 23 Aug 2023 06:18 )   PT: 12.9 sec;   INR: 1.11 ratio         PTT - ( 22 Aug 2023 07:33 )  PTT:26.8 sec  Urinalysis Basic - ( 23 Aug 2023 06:18 )    Color: x / Appearance: x / SG: x / pH: x  Gluc: 171 mg/dL / Ketone: x  / Bili: x / Urobili: x   Blood: x / Protein: x / Nitrite: x   Leuk Esterase: x / RBC: x / WBC x   Sq Epi: x / Non Sq Epi: x / Bacteria: x      CAPILLARY BLOOD GLUCOSE      POCT Blood Glucose.: 132 mg/dL (23 Aug 2023 11:22)  POCT Blood Glucose.: 151 mg/dL (23 Aug 2023 07:45)  POCT Blood Glucose.: 210 mg/dL (22 Aug 2023 21:56)  POCT Blood Glucose.: 146 mg/dL (22 Aug 2023 17:06)          RADIOLOGY & ADDITIONAL TESTS:    Personally reviewed.     Consultant(s) Notes Reviewed:  [x] YES  [ ] NO     Patient is a 68y old  Male who presents with a chief complaint of Liver Cirrhosis with ascites, Liver cancer, hyponatremia (23 Aug 2023 11:55)      Subjective:  INTERVAL HPI/OVERNIGHT EVENTS: Patient seen and examined at bedside. Patient is Mele speaking,  ID [id number]. Patient had one episode of tachycardia to 130-140 overnight while ambulating to the bathroom. Patient denied all symptoms related to the episode including chest pain and SOB. Patient's HR is currently in the 110s. Patient endorses LUQ abdominal pain that is dull in nature. He had one BM in the morning with blood. Patient states new onset SOB that started earlier in the morning likely secondary to the ascites. He was given a dose of Lasix with improvement in symptoms. Patient still reports L sided chest/rib pain which has remained unchanged from yesterday. Patient also reports lack of appetite and weakness that have remained unchanged since admission. Denies fevers, chills, headache, lightheadedness, palpitations, nausea, vomiting, diarrhea/constipation.            REVIEW OF SYSTEMS:  CONSTITUTIONAL: (+) Lack of appetite. No fever or chills  HEENT:  No headache, no sore throat  RESPIRATORY: (+) SOB. No cough or wheezing  CARDIOVASCULAR: (+) Mild L sided lower chest pain. No palpitations  GASTROINTESTINAL: (+) LUQ abdominal pain and hematochezia. No nausea, vomiting, or diarrhea  GENITOURINARY: No dysuria, frequency, or hematuria  NEUROLOGICAL: (+) Generalized weakness. No dizziness  MUSCULOSKELETAL: no myalgias       Objective:  Vital Signs Last 24 Hrs  T(C): 36.3 (23 Aug 2023 11:24), Max: 36.5 (22 Aug 2023 20:36)  T(F): 97.3 (23 Aug 2023 11:24), Max: 97.7 (22 Aug 2023 20:36)  HR: 113 (23 Aug 2023 11:24) (113 - 125)  BP: 107/75 (23 Aug 2023 11:24) (107/75 - 135/84)  BP(mean): --  RR: 18 (23 Aug 2023 11:24) (18 - 18)  SpO2: 96% (23 Aug 2023 11:24) (96% - 97%)    Parameters below as of 23 Aug 2023 11:24  Patient On (Oxygen Delivery Method): nasal cannula  O2 Flow (L/min): 2      GENERAL: NAD, lying in bed with increased work of breathing   HEAD:  Atraumatic, Normocephalic  EYES: EOMI, PERRLA, +scleral icterus noted b/l  ENT: Moist oral mucous   NECK: Supple, No JVD  CHEST/LUNG:  bl   air entery Tenderness to palpation over L lower chest/ribs   bilaterally. +Expiratory wheezes b/l  HEART: S1,S2.  sinus tachy  + and rhythm. No murmurs  ABDOMEN: BSx4. Soft, tenderness to palpation in LUQ. Distended (more than yesterday). No rebound or guarding   EXTREMITIES: No clubbing, cyanosis, or edema  NERVOUS SYSTEM:  Alert & Oriented X3,  sensory / motor intact     MEDICATIONS  (STANDING):  carvedilol 3.125 milliGRAM(s) Oral every 12 hours  cefTRIAXone   IVPB 2000 milliGRAM(s) IV Intermittent every 24 hours  dextrose 5%. 1000 milliLiter(s) (50 mL/Hr) IV Continuous <Continuous>  dextrose 5%. 1000 milliLiter(s) (100 mL/Hr) IV Continuous <Continuous>  dextrose 50% Injectable 25 Gram(s) IV Push once  dextrose 50% Injectable 12.5 Gram(s) IV Push once  dextrose 50% Injectable 25 Gram(s) IV Push once  glucagon  Injectable 1 milliGRAM(s) IntraMuscular once  insulin glargine Injectable (LANTUS) 5 Unit(s) SubCutaneous every morning  insulin lispro (ADMELOG) corrective regimen sliding scale   SubCutaneous three times a day before meals  insulin lispro (ADMELOG) corrective regimen sliding scale   SubCutaneous at bedtime  insulin lispro Injectable (ADMELOG) 3 Unit(s) SubCutaneous before breakfast  insulin lispro Injectable (ADMELOG) 3 Unit(s) SubCutaneous before dinner  insulin lispro Injectable (ADMELOG) 3 Unit(s) SubCutaneous before lunch  phenytoin   Capsule 100 milliGRAM(s) Oral daily  rifAXIMin 550 milliGRAM(s) Oral two times a day  spironolactone 25 milliGRAM(s) Oral daily    MEDICATIONS  (PRN):  albumin human 25% IVPB 50 milliLiter(s) IV Intermittent once PRN paracentesis  albuterol/ipratropium for Nebulization 3 milliLiter(s) Nebulizer every 6 hours PRN Shortness of Breath and/or Wheezing  melatonin 3 milliGRAM(s) Oral at bedtime PRN Insomnia      Allergies    No Known Allergies    Intolerances      LABS:                        14.6   12.47 )-----------( 536      ( 23 Aug 2023 06:18 )             44.1     CBC Full  -  ( 23 Aug 2023 06:18 )  WBC Count : 12.47 K/uL  Hemoglobin : 14.6 g/dL  Hematocrit : 44.1 %  Platelet Count - Automated : 536 K/uL  Mean Cell Volume : 81.8 fl  Mean Cell Hemoglobin : 27.1 pg  Mean Cell Hemoglobin Concentration : 33.1 gm/dL  Auto Neutrophil # : 8.86 K/uL  Auto Lymphocyte # : 1.65 K/uL  Auto Monocyte # : 1.42 K/uL  Auto Eosinophil # : 0.31 K/uL  Auto Basophil # : 0.15 K/uL  Auto Neutrophil % : 71.1 %  Auto Lymphocyte % : 13.2 %  Auto Monocyte % : 11.4 %  Auto Eosinophil % : 2.5 %  Auto Basophil % : 1.2 %    23 Aug 2023 06:18    125    |  98     |  32     ----------------------------<  171    5.5     |  19     |  1.70     Ca    8.5        23 Aug 2023 06:18  Phos  3.7       23 Aug 2023 06:18  Mg     2.3       23 Aug 2023 06:18    TPro  7.8    /  Alb  2.1    /  TBili  0.8    /  DBili  x      /  AST  57     /  ALT  36     /  AlkPhos  138    23 Aug 2023 06:18    PT/INR - ( 23 Aug 2023 06:18 )   PT: 12.9 sec;   INR: 1.11 ratio         PTT - ( 22 Aug 2023 07:33 )  PTT:26.8 sec  Urinalysis Basic - ( 23 Aug 2023 06:18 )    Color: x / Appearance: x / SG: x / pH: x  Gluc: 171 mg/dL / Ketone: x  / Bili: x / Urobili: x   Blood: x / Protein: x / Nitrite: x   Leuk Esterase: x / RBC: x / WBC x   Sq Epi: x / Non Sq Epi: x / Bacteria: x      CAPILLARY BLOOD GLUCOSE      POCT Blood Glucose.: 132 mg/dL (23 Aug 2023 11:22)  POCT Blood Glucose.: 151 mg/dL (23 Aug 2023 07:45)  POCT Blood Glucose.: 210 mg/dL (22 Aug 2023 21:56)  POCT Blood Glucose.: 146 mg/dL (22 Aug 2023 17:06)          RADIOLOGY & ADDITIONAL TESTS:    Personally reviewed.     Consultant(s) Notes Reviewed:  [x] YES  [ ] NO     Patient is a 68y old  Male who presents with a chief complaint of Liver Cirrhosis with ascites, Liver cancer, hyponatremia (23 Aug 2023 11:55)      Subjective:  INTERVAL HPI/OVERNIGHT EVENTS: Patient seen and examined at bedside. Patient is Mele speaking,  ID [id number]. Patient had one episode of tachycardia to 130-140 overnight while ambulating to the bathroom. Patient denied all symptoms related to the episode including chest pain and SOB. Patient's HR is currently in the 110s. Patient endorses LUQ abdominal pain that is dull in nature. He had one BM in the morning with blood. Patient states new onset SOB that started earlier in the morning. He notes improvement after given Lasix. Patient still reports L sided chest/rib pain which has remained unchanged from yesterday. Patient also reports lack of appetite and weakness that have remained unchanged since admission. Denies fevers, chills, headache, lightheadedness, palpitations, nausea, vomiting, diarrhea/constipation.        REVIEW OF SYSTEMS:  CONSTITUTIONAL: (+) Lack of appetite. No fever or chills  HEENT:  No headache, no sore throat  RESPIRATORY: (+) SOB. No cough or wheezing  CARDIOVASCULAR: (+) Mild L sided lower chest pain. No palpitations  GASTROINTESTINAL: (+) LUQ abdominal pain and hematochezia. No nausea, vomiting, or diarrhea  GENITOURINARY: No dysuria, frequency, or hematuria  NEUROLOGICAL: (+) Generalized weakness. No dizziness  MUSCULOSKELETAL: no myalgias       Objective:  Vital Signs Last 24 Hrs  T(C): 36.3 (23 Aug 2023 11:24), Max: 36.5 (22 Aug 2023 20:36)  T(F): 97.3 (23 Aug 2023 11:24), Max: 97.7 (22 Aug 2023 20:36)  HR: 113 (23 Aug 2023 11:24) (113 - 125)  BP: 107/75 (23 Aug 2023 11:24) (107/75 - 135/84)  BP(mean): --  RR: 18 (23 Aug 2023 11:24) (18 - 18)  SpO2: 96% (23 Aug 2023 11:24) (96% - 97%)    Parameters below as of 23 Aug 2023 11:24  Patient On (Oxygen Delivery Method): nasal cannula  O2 Flow (L/min): 2      GENERAL: NAD, lying in bed with increased work of breathing   HEAD:  Atraumatic, Normocephalic  EYES: EOMI, PERRLA, +scleral icterus noted b/l  ENT: Moist oral mucous   NECK: Supple, No JVD  CHEST/LUNG:  Tenderness to palpation over L lower chest/ribs bilaterally. +Expiratory wheezes b/l  HEART: S1,S2. + sinus tachy No murmurs  ABDOMEN: BSx4. Soft, tenderness to palpation in LUQ. Distended (more than yesterday). No rebound or guarding   EXTREMITIES: No clubbing, cyanosis, or edema  NERVOUS SYSTEM:  Alert & Oriented X3,    MEDICATIONS  (STANDING):  carvedilol 3.125 milliGRAM(s) Oral every 12 hours  cefTRIAXone   IVPB 2000 milliGRAM(s) IV Intermittent every 24 hours  dextrose 5%. 1000 milliLiter(s) (50 mL/Hr) IV Continuous <Continuous>  dextrose 5%. 1000 milliLiter(s) (100 mL/Hr) IV Continuous <Continuous>  dextrose 50% Injectable 25 Gram(s) IV Push once  dextrose 50% Injectable 12.5 Gram(s) IV Push once  dextrose 50% Injectable 25 Gram(s) IV Push once  glucagon  Injectable 1 milliGRAM(s) IntraMuscular once  insulin glargine Injectable (LANTUS) 5 Unit(s) SubCutaneous every morning  insulin lispro (ADMELOG) corrective regimen sliding scale   SubCutaneous three times a day before meals  insulin lispro (ADMELOG) corrective regimen sliding scale   SubCutaneous at bedtime  insulin lispro Injectable (ADMELOG) 3 Unit(s) SubCutaneous before breakfast  insulin lispro Injectable (ADMELOG) 3 Unit(s) SubCutaneous before dinner  insulin lispro Injectable (ADMELOG) 3 Unit(s) SubCutaneous before lunch  phenytoin   Capsule 100 milliGRAM(s) Oral daily  rifAXIMin 550 milliGRAM(s) Oral two times a day  spironolactone 25 milliGRAM(s) Oral daily    MEDICATIONS  (PRN):  albumin human 25% IVPB 50 milliLiter(s) IV Intermittent once PRN paracentesis  albuterol/ipratropium for Nebulization 3 milliLiter(s) Nebulizer every 6 hours PRN Shortness of Breath and/or Wheezing  melatonin 3 milliGRAM(s) Oral at bedtime PRN Insomnia      Allergies    No Known Allergies    Intolerances      LABS:                        14.6   12.47 )-----------( 536      ( 23 Aug 2023 06:18 )             44.1     CBC Full  -  ( 23 Aug 2023 06:18 )  WBC Count : 12.47 K/uL  Hemoglobin : 14.6 g/dL  Hematocrit : 44.1 %  Platelet Count - Automated : 536 K/uL  Mean Cell Volume : 81.8 fl  Mean Cell Hemoglobin : 27.1 pg  Mean Cell Hemoglobin Concentration : 33.1 gm/dL  Auto Neutrophil # : 8.86 K/uL  Auto Lymphocyte # : 1.65 K/uL  Auto Monocyte # : 1.42 K/uL  Auto Eosinophil # : 0.31 K/uL  Auto Basophil # : 0.15 K/uL  Auto Neutrophil % : 71.1 %  Auto Lymphocyte % : 13.2 %  Auto Monocyte % : 11.4 %  Auto Eosinophil % : 2.5 %  Auto Basophil % : 1.2 %    23 Aug 2023 06:18    125    |  98     |  32     ----------------------------<  171    5.5     |  19     |  1.70     Ca    8.5        23 Aug 2023 06:18  Phos  3.7       23 Aug 2023 06:18  Mg     2.3       23 Aug 2023 06:18    TPro  7.8    /  Alb  2.1    /  TBili  0.8    /  DBili  x      /  AST  57     /  ALT  36     /  AlkPhos  138    23 Aug 2023 06:18    PT/INR - ( 23 Aug 2023 06:18 )   PT: 12.9 sec;   INR: 1.11 ratio         PTT - ( 22 Aug 2023 07:33 )  PTT:26.8 sec  Urinalysis Basic - ( 23 Aug 2023 06:18 )    Color: x / Appearance: x / SG: x / pH: x  Gluc: 171 mg/dL / Ketone: x  / Bili: x / Urobili: x   Blood: x / Protein: x / Nitrite: x   Leuk Esterase: x / RBC: x / WBC x   Sq Epi: x / Non Sq Epi: x / Bacteria: x      CAPILLARY BLOOD GLUCOSE      POCT Blood Glucose.: 132 mg/dL (23 Aug 2023 11:22)  POCT Blood Glucose.: 151 mg/dL (23 Aug 2023 07:45)  POCT Blood Glucose.: 210 mg/dL (22 Aug 2023 21:56)  POCT Blood Glucose.: 146 mg/dL (22 Aug 2023 17:06)          RADIOLOGY & ADDITIONAL TESTS:    Personally reviewed.     Consultant(s) Notes Reviewed:  [x] YES  [ ] NO

## 2023-08-23 NOTE — PROGRESS NOTE ADULT - PROBLEM SELECTOR PLAN 2
- CT A/P: Highly suspicious for primary HCC versus metastasis. Peritoneal carcinomatosis and moderate volume ascites.  - AFP 4.2 WNL    - Protein/Cr ratio WNL  - Heme/Onc Dr. Winter group following - may still need tissue bx for diagnosis and NGS markers studies for potential targeted therapy  - GI Dr. Devi following, f/u recs - CT A/P: Highly suspicious for primary HCC versus metastasis. Peritoneal carcinomatosis and moderate volume ascites.  - AFP 4.2 WNL    - Protein/Cr ratio WNL  - Heme/Onc Dr. Winter group following - may still need tissue bx for diagnosis and NGS markers studies for potential targeted therapy  - GI Dr. Devi following,

## 2023-08-23 NOTE — PROGRESS NOTE ADULT - SUBJECTIVE AND OBJECTIVE BOX
[facility]   INFECTIOUS DISEASES   [address] [place] [address]  Tel: [phone]     Fax: [phone]  =========================================================  MD Marco Weber Kaushal, MD Cho, Michelle, MD Sunjit, Jaspal, MD  =========================================================    HERMANN XAVIER [id number]    Follow up: SBP. Overnight patient had elevated HR to 140's while ambulating to the bathroom. He was asymptomatic. His LUQ pain is improving. He had some SOB this morning and was given Lasix, which improved his symptoms. He has not been able to urinate today, but he had a normal consistency BM this morning.     Allergies:  No Known Allergies      Antibiotics:  albumin human 25% IVPB 50 milliLiter(s) IV Intermittent once PRN  albuterol/ipratropium for Nebulization 3 milliLiter(s) Nebulizer every 6 hours PRN  carvedilol 3.125 milliGRAM(s) Oral every 12 hours  cefTRIAXone   IVPB 2000 milliGRAM(s) IV Intermittent every 24 hours  dextrose 5%. 1000 milliLiter(s) IV Continuous <Continuous>  dextrose 5%. 1000 milliLiter(s) IV Continuous <Continuous>  dextrose 50% Injectable 25 Gram(s) IV Push once  dextrose 50% Injectable 12.5 Gram(s) IV Push once  dextrose 50% Injectable 25 Gram(s) IV Push once  glucagon  Injectable 1 milliGRAM(s) IntraMuscular once  insulin glargine Injectable (LANTUS) 5 Unit(s) SubCutaneous every morning  insulin lispro (ADMELOG) corrective regimen sliding scale   SubCutaneous at bedtime  insulin lispro (ADMELOG) corrective regimen sliding scale   SubCutaneous three times a day before meals  insulin lispro Injectable (ADMELOG) 3 Unit(s) SubCutaneous before breakfast  insulin lispro Injectable (ADMELOG) 3 Unit(s) SubCutaneous before dinner  insulin lispro Injectable (ADMELOG) 3 Unit(s) SubCutaneous before lunch  melatonin 3 milliGRAM(s) Oral at bedtime PRN  phenytoin   Capsule 100 milliGRAM(s) Oral daily  rifAXIMin 550 milliGRAM(s) Oral two times a day  spironolactone 25 milliGRAM(s) Oral daily       REVIEW OF SYSTEMS:  CONSTITUTIONAL:  No Fever or chills  HEENT:   No diplopia or blurred vision.  No earache, sore throat or runny nose.  CARDIOVASCULAR:  No chest pain or SOB  RESPIRATORY:  +SOB this morning   GASTROINTESTINAL:  No nausea, vomiting or diarrhea.  GENITOURINARY:  Unable to urinate   MUSCULOSKELETAL:  no joint aches, no muscle pain  SKIN:  No change in skin, hair or nails.  NEUROLOGIC:  No paresthesias or weakness.  PSYCHIATRIC:  No disorder of thought or mood.  ENDOCRINE:  No heat or cold intolerance, polyuria or polydipsia.  HEMATOLOGICAL:  No easy bruising or bleeding.      Physical Exam:  ICU Vital Signs Last 24 Hrs  T(C): 36.3 (23 Aug 2023 11:24), Max: 36.9 (22 Aug 2023 11:58)  T(F): 97.3 (23 Aug 2023 11:24), Max: 98.5 (22 Aug 2023 11:58)  HR: 113 (23 Aug 2023 11:24) (109 - 125)  BP: 107/75 (23 Aug 2023 11:24) (107/74 - 135/84)  BP(mean): --  ABP: --  ABP(mean): --  RR: 18 (23 Aug 2023 11:24) (18 - 18)  SpO2: 96% (23 Aug 2023 11:24) (93% - 97%)    O2 Parameters below as of 23 Aug 2023 11:24  Patient On (Oxygen Delivery Method): nasal cannula  O2 Flow (L/min): 2         GEN: NAD  HEENT: normocephalic and atraumatic. EOMI. MANJINDER.    NECK: Supple.  No lymphadenopathy   LUNGS: +Expiratory wheezes   HEART: Regular rate and rhythm without murmur.  ABDOMEN: +Distended, firm, no TTP  : No CVA tenderness  EXTREMITIES: Without edema.  MSK: no joint swelling  NEUROLOGIC: grossly intact.  PSYCHIATRIC: Appropriate affect .  SKIN: No rash     Labs:  08-23    125<L>  |  98  |  32<H>  ----------------------------<  171<H>  5.5<H>   |  19<L>  |  1.70<H>    Ca    8.5      23 Aug 2023 06:18  Phos  3.7     08-23  Mg     2.3     08-23    TPro  7.8  /  Alb  2.1<L>  /  TBili  0.8  /  DBili  x   /  AST  57<H>  /  ALT  36  /  AlkPhos  138<H>  08-23                          14.6   12.47 )-----------( 536      ( 23 Aug 2023 06:18 )             44.1     PT/INR - ( 23 Aug 2023 06:18 )   PT: 12.9 sec;   INR: 1.11 ratio         PTT - ( 22 Aug 2023 07:33 )  PTT:26.8 sec  Urinalysis Basic - ( 23 Aug 2023 06:18 )    Color: x / Appearance: x / SG: x / pH: x  Gluc: 171 mg/dL / Ketone: x  / Bili: x / Urobili: x   Blood: x / Protein: x / Nitrite: x   Leuk Esterase: x / RBC: x / WBC x   Sq Epi: x / Non Sq Epi: x / Bacteria: x      LIVER FUNCTIONS - ( 23 Aug 2023 06:18 )  Alb: 2.1 g/dL / Pro: 7.8 g/dL / ALK PHOS: 138 U/L / ALT: 36 U/L / AST: 57 U/L / GGT: x             RECENT CULTURES:        All imaging and data are reviewed.   CT Ab/ Pelvis 08/21/23  FINDINGS:  LOWER CHEST: Total situs inversus.    LIVER: Cirrhosis. A segment 8/4 A exophytic peripherally enhancing   low-density mass extends superiorly to the diaphragm, measuring   approximately 5.2 x 4.1 cm. Additional 5 cm cyst low-density lesions that   are too small to characterize.  BILE DUCTS: Normal caliber.  GALLBLADDER: Within normal limits.  SPLEEN: Within normal limits.  PANCREAS: Within normal limits.  ADRENALS: Within normal limits.  KIDNEYS/URETERS: Within normal limits.    BLADDER: Within normal limits.  REPRODUCTIVE ORGANS: Mildly prominent prostate gland.    BOWEL: No bowel obstruction.  PERITONEUM: Moderate volume ascites with soft tissue implants diffusely   along the peritoneum as well as the serosal surface of the sigmoid colon.   A perisigmoid implant measures approximately 3.9 x 2.8 cm in greatest   transaxial dimension. Soft tissue implants are noted along the falciform   ligament. Omental nodularity is present.  VESSELS: Patent portal, superior mesenteric, and splenic veins. Diffuse   abdominal collaterals..  RETROPERITONEUM/LYMPH NODES: No lymphadenopathy.  ABDOMINAL WALL: Left inguinal hernia containing fluid and fat.  BONES: Degenerative changes.    IMPRESSION: Total situs inversus.  Cirrhosis with a dominant liver mass concerning for primary HCC versus   metastasis.  Peritoneal carcinomatosis and moderate volume ascites.      Assessment and Plan: Patient is a 69yo M with a PMH of liver cirrhosis s/p esophageal varices banding, situs inversus, HF?, bradycardia, HTN, HLD, T2DM, seizure, GERD who was admitted for for liver cirrhosis with ascites concerning for HCC, complicated with sepsis possible secondary to SBP and electrolyte imbalance/hyponatremia. 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 He states his LUQ pain is improving from yesterday. CT ab/pelvis showed cirrhosis with a dominant liver mass concerning for primary HCC versus metastasis, and peritoneal carcinomatosis and moderate volume ascites. Patient's abdomen was still distended and firm, and there was minimal TTP in the LUQ. On PE there were some b/l expiratory wheezes. Patient is afebrile. Denies chills, headache, lightheadedness, palpitations, dyspnea, nausea, vomiting, diarrhea/constipation. Patient is scheduled to have IR drainage of the peritoneal fluid today.     - Continue IV Ceftriaxone 2g q24 to cover for possible SBP   - Continue Rifaximin 550mg BID to cover for SBP  - Lactate 1.7  - UA significant for total protein 24, glucose 250 and small amount of bilirubin   - Hep C negative   - Trend WBC --> elevated but downtrending   - Trend fevers --> currently afebrile   - F/u blood cultures  - F/u IR peritoneal fluid culture and PMN count and protein level        Krystle Allred MD  Division of Infectious Diseases   Please call ID service at [phone] with any question.      35 minutes spent on total encounter assessing patient, examination, chart review, counseling and coordinating care by the attending physician/nurse/care manager.

## 2023-08-23 NOTE — DIETITIAN INITIAL EVALUATION ADULT - PROBLEM SELECTOR PLAN 9
expiratory wheezing on exam  - pt states no hx of asthma, not on any inhalers  - ordered PRN duonebs and alb inhaler  - f/u chest xray  - Pulm Dr. Denson consulted, f/u recs

## 2023-08-23 NOTE — DIETITIAN INITIAL EVALUATION ADULT - PERTINENT LABORATORY DATA
08-23    125<L>  |  98  |  32<H>  ----------------------------<  171<H>  5.5<H>   |  19<L>  |  1.70<H>    Ca    8.5      23 Aug 2023 06:18  Phos  3.7     08-23  Mg     2.3     08-23    TPro  7.8  /  Alb  2.1<L>  /  TBili  0.8  /  DBili  x   /  AST  57<H>  /  ALT  36  /  AlkPhos  138<H>  08-23  POCT Blood Glucose.: 151 mg/dL (08-23-23 @ 07:45)  A1C with Estimated Average Glucose Result: 9.4 % (08-22-23 @ 07:33)

## 2023-08-23 NOTE — PROGRESS NOTE ADULT - PROBLEM SELECTOR PLAN 6
Patient reports one episode of red bright blood in the stools, denies history of hemorrhoids.   - Patient had 1 bloody BM today (8/23)  - F/u FOBT   - No intervention at this time - f/u H/H (WNL today)  - GI Dr. Devi following, f/u recs Patient reports one episode of red bright blood in the stools, denies history of hemorrhoids.   - Patient had 1 bloody BM today (8/23)  - F/u FOBT   - No intervention at this time as Hgb is WNL - f/u H/H   - GI Dr. Devi following, f/u recs

## 2023-08-24 NOTE — PROGRESS NOTE ADULT - SUBJECTIVE AND OBJECTIVE BOX
[facility]  Yo Colin PA-C  [address] [place] [address]  [phone]      INTERVAL HPI/OVERNIGHT EVENTS:  Pt s/e with family at bedside  S/p paracentesis    MEDICATIONS  (STANDING):  carvedilol 3.125 milliGRAM(s) Oral every 12 hours  cefTRIAXone   IVPB 2000 milliGRAM(s) IV Intermittent every 24 hours  dextrose 5%. 1000 milliLiter(s) (50 mL/Hr) IV Continuous <Continuous>  dextrose 5%. 1000 milliLiter(s) (100 mL/Hr) IV Continuous <Continuous>  dextrose 50% Injectable 25 Gram(s) IV Push once  dextrose 50% Injectable 12.5 Gram(s) IV Push once  dextrose 50% Injectable 25 Gram(s) IV Push once  glucagon  Injectable 1 milliGRAM(s) IntraMuscular once  heparin   Injectable 5000 Unit(s) SubCutaneous every 12 hours  insulin glargine Injectable (LANTUS) 10 Unit(s) SubCutaneous every morning  insulin lispro (ADMELOG) corrective regimen sliding scale   SubCutaneous at bedtime  insulin lispro (ADMELOG) corrective regimen sliding scale   SubCutaneous three times a day before meals  insulin lispro Injectable (ADMELOG) 6 Unit(s) SubCutaneous before breakfast  insulin lispro Injectable (ADMELOG) 6 Unit(s) SubCutaneous before lunch  insulin lispro Injectable (ADMELOG) 6 Unit(s) SubCutaneous before dinner  phenytoin   Capsule 100 milliGRAM(s) Oral daily  rifAXIMin 550 milliGRAM(s) Oral two times a day  sodium chloride 2 Gram(s) Oral every 6 hours  spironolactone 25 milliGRAM(s) Oral daily    MEDICATIONS  (PRN):  albuterol/ipratropium for Nebulization 3 milliLiter(s) Nebulizer every 6 hours PRN Shortness of Breath and/or Wheezing  melatonin 3 milliGRAM(s) Oral at bedtime PRN Insomnia      Allergies  No Known Allergies    PHYSICAL EXAM:   Vital Signs:  Vital Signs Last 24 Hrs  T(C): 37.4 (24 Aug 2023 11:50), Max: 37.4 (24 Aug 2023 11:50)  T(F): 99.4 (24 Aug 2023 11:50), Max: 99.4 (24 Aug 2023 11:50)  HR: 112 (24 Aug 2023 11:50) (102 - 123)  BP: 103/72 (24 Aug 2023 11:50) (103/72 - 117/75)  BP(mean): --  RR: 18 (24 Aug 2023 11:50) (18 - 20)  SpO2: 94% (24 Aug 2023 11:50) (93% - 99%)    Parameters below as of 24 Aug 2023 11:50  Patient On (Oxygen Delivery Method): nasal cannula  O2 Flow (L/min): 2    Daily     Daily Weight in k.5 (24 Aug 2023 05:34)    GENERAL:  Appears stated age  HEENT:  NC/AT  CHEST:  Full & symmetric excursion  HEART:  Regular rhythm  ABDOMEN:  Soft, non-tender, non-distended  EXTEREMITIES:  no cyanosis  SKIN:  No rash  NEURO:  Alert      LABS:                        14.1   14.87 )-----------( 520      ( 24 Aug 2023 06:00 )             41.8     08-24    121<L>  |  93<L>  |  39<H>  ----------------------------<  362<H>  4.6   |  18<L>  |  1.80<H>    Ca    8.4<L>      24 Aug 2023 06:00  Phos  4.2     08-24  Mg     2.4     08-24    TPro  7.3  /  Alb  2.0<L>  /  TBili  0.6  /  DBili  x   /  AST  52<H>  /  ALT  35  /  AlkPhos  151<H>  08-24    PT/INR - ( 23 Aug 2023 06:18 )   PT: 12.9 sec;   INR: 1.11 ratio           Urinalysis Basic - ( 24 Aug 2023 06:00 )    Color: x / Appearance: x / SG: x / pH: x  Gluc: 362 mg/dL / Ketone: x  / Bili: x / Urobili: x   Blood: x / Protein: x / Nitrite: x   Leuk Esterase: x / RBC: x / WBC x   Sq Epi: x / Non Sq Epi: x / Bacteria: x

## 2023-08-24 NOTE — PROGRESS NOTE ADULT - SUBJECTIVE AND OBJECTIVE BOX
[facility] -- Abhi Alvarenga Pannella, Patel, Savella, Goodger  Office # [phone]    Follow Up:   CHF     Subjective/Observations: seen and examined, awake, alert, denies chest pain, dyspnea, palpitations or dizziness. Tolerating O2 via NC, 24 events noted     REVIEW OF SYSTEMS: All other review of systems is negative unless indicated above  PAST MEDICAL & SURGICAL HISTORY:  T2DM (type 2 diabetes mellitus)      History of seizure      History of bradycardia      GERD (gastroesophageal reflux disease)      History of cirrhosis of liver      HLD (hyperlipidemia)      HTN (hypertension)      H/O esophageal varices        MEDICATIONS  (STANDING):  carvedilol 3.125 milliGRAM(s) Oral every 12 hours  cefTRIAXone   IVPB 2000 milliGRAM(s) IV Intermittent every 24 hours  dextrose 5%. 1000 milliLiter(s) (50 mL/Hr) IV Continuous <Continuous>  dextrose 5%. 1000 milliLiter(s) (100 mL/Hr) IV Continuous <Continuous>  dextrose 50% Injectable 12.5 Gram(s) IV Push once  dextrose 50% Injectable 25 Gram(s) IV Push once  dextrose 50% Injectable 25 Gram(s) IV Push once  glucagon  Injectable 1 milliGRAM(s) IntraMuscular once  heparin   Injectable 5000 Unit(s) SubCutaneous every 12 hours  insulin glargine Injectable (LANTUS) 10 Unit(s) SubCutaneous every morning  insulin lispro (ADMELOG) corrective regimen sliding scale   SubCutaneous three times a day before meals  insulin lispro (ADMELOG) corrective regimen sliding scale   SubCutaneous at bedtime  insulin lispro Injectable (ADMELOG) 6 Unit(s) SubCutaneous before breakfast  insulin lispro Injectable (ADMELOG) 6 Unit(s) SubCutaneous before lunch  insulin lispro Injectable (ADMELOG) 6 Unit(s) SubCutaneous before dinner  phenytoin   Capsule 100 milliGRAM(s) Oral daily  rifAXIMin 550 milliGRAM(s) Oral two times a day  sodium chloride 2 Gram(s) Oral every 6 hours  spironolactone 25 milliGRAM(s) Oral daily    MEDICATIONS  (PRN):  albuterol/ipratropium for Nebulization 3 milliLiter(s) Nebulizer every 6 hours PRN Shortness of Breath and/or Wheezing  melatonin 3 milliGRAM(s) Oral at bedtime PRN Insomnia    Allergies    No Known Allergies    Intolerances      Vital Signs Last 24 Hrs  T(C): 37.4 (24 Aug 2023 11:50), Max: 37.4 (24 Aug 2023 11:50)  T(F): 99.4 (24 Aug 2023 11:50), Max: 99.4 (24 Aug 2023 11:50)  HR: 112 (24 Aug 2023 11:50) (102 - 123)  BP: 103/72 (24 Aug 2023 11:50) (103/72 - 118/76)  BP(mean): --  RR: 18 (24 Aug 2023 11:50) (18 - 26)  SpO2: 94% (24 Aug 2023 11:50) (93% - 99%)    Parameters below as of 24 Aug 2023 11:50  Patient On (Oxygen Delivery Method): nasal cannula  O2 Flow (L/min): 2    I&O's Summary    23 Aug 2023 07:01  -  24 Aug 2023 07:00  --------------------------------------------------------  IN: 0 mL / OUT: 4750 mL / NET: -4750 mL        TELE: SR/  up to 120's non sustained   PHYSICAL EXAM:  Constitutional: NAD, awake and alert  HEENT: Moist Mucous Membranes, Anicteric  Pulmonary: mildly tachypneic, dim BS, No wheezing, rales or rhonchi  Cardiovascular: Regular, S1 and S2, No murmurs, rubs, gallops or clicks  Gastrointestinal: Bowel Sounds present, soft, nontender  Lymph: No peripheral edema. No lymphadenopathy.  Skin: No visible rashes or ulcers.  Psych:  Mood & affect appropriate  LABS: All Labs Reviewed:                                   14.1   14.87 )-----------( 520      ( 24 Aug 2023 06:00 )             41.8                         14.6   12.47 )-----------( 536      ( 23 Aug 2023 06:18 )             44.1                         12.9   14.06 )-----------( 459      ( 22 Aug 2023 07:33 )             38.3     24 Aug 2023 06:00    121    |  93     |  39     ----------------------------<  362    4.6     |  18     |  1.80   23 Aug 2023 16:15    x      |  x      |  x      ----------------------------<  x      5.2     |  x      |  x      23 Aug 2023 12:50    x      |  x      |  x      ----------------------------<  x      5.6     |  x      |  x        Ca    8.4        24 Aug 2023 06:00  Ca    8.5        23 Aug 2023 06:18  Ca    8.1        22 Aug 2023 07:33  Phos  4.2       24 Aug 2023 06:00  Phos  3.7       23 Aug 2023 06:18  Mg     2.4       24 Aug 2023 06:00  Mg     2.3       23 Aug 2023 06:18    TPro  7.3    /  Alb  2.0    /  TBili  0.6    /  DBili  x      /  AST  52     /  ALT  35     /  AlkPhos  151    24 Aug 2023 06:00  TPro  7.8    /  Alb  2.1    /  TBili  0.8    /  DBili  x      /  AST  57     /  ALT  36     /  AlkPhos  138    23 Aug 2023 06:18  TPro  7.4    /  Alb  2.1    /  TBili  0.6    /  DBili  x      /  AST  51     /  ALT  32     /  AlkPhos  110    22 Aug 2023 07:33    PT/INR - ( 23 Aug 2023 06:18 )   PT: 12.9 sec;   INR: 1.11 ratio               12 Lead ECG:   Ventricular Rate 112 BPM    Atrial Rate 112 BPM    P-R Interval 156 ms    QRS Duration 122 ms    Q-T Interval 350 ms    QTC Calculation(Bazett) 477 ms    P Axis 78 degrees    R Axis -47 degrees    T Axis 84 degrees    Diagnosis Line Sinus tachycardia  Nonspecific intraventricular conduction delay  Left axis deviation  Anterior infarct  Abnormal ECG  Confirmed by kedar Toledo ([id number]) on 8/22/2023 4:11:03 PM (08-22-23 @ 12:23)

## 2023-08-24 NOTE — PROGRESS NOTE ADULT - SUBJECTIVE AND OBJECTIVE BOX
Date/Time Patient Seen:  		  Referring MD:   Data Reviewed	       Patient is a 68y old  Male who presents with a chief complaint of Liver Cirrhosis with ascites, Liver cancer, hyponatremia (23 Aug 2023 12:56)      Subjective/HPI     PAST MEDICAL & SURGICAL HISTORY:  T2DM (type 2 diabetes mellitus)    History of seizure    History of bradycardia    GERD (gastroesophageal reflux disease)    History of cirrhosis of liver    HLD (hyperlipidemia)    HTN (hypertension)    H/O esophageal varices          Medication list         MEDICATIONS  (STANDING):  carvedilol 3.125 milliGRAM(s) Oral every 12 hours  cefTRIAXone   IVPB 2000 milliGRAM(s) IV Intermittent every 24 hours  dextrose 5%. 1000 milliLiter(s) (50 mL/Hr) IV Continuous <Continuous>  dextrose 5%. 1000 milliLiter(s) (100 mL/Hr) IV Continuous <Continuous>  dextrose 50% Injectable 25 Gram(s) IV Push once  dextrose 50% Injectable 12.5 Gram(s) IV Push once  dextrose 50% Injectable 25 Gram(s) IV Push once  glucagon  Injectable 1 milliGRAM(s) IntraMuscular once  insulin glargine Injectable (LANTUS) 5 Unit(s) SubCutaneous every morning  insulin lispro (ADMELOG) corrective regimen sliding scale   SubCutaneous at bedtime  insulin lispro (ADMELOG) corrective regimen sliding scale   SubCutaneous three times a day before meals  insulin lispro Injectable (ADMELOG) 3 Unit(s) SubCutaneous before lunch  insulin lispro Injectable (ADMELOG) 3 Unit(s) SubCutaneous before breakfast  insulin lispro Injectable (ADMELOG) 3 Unit(s) SubCutaneous before dinner  phenytoin   Capsule 100 milliGRAM(s) Oral daily  rifAXIMin 550 milliGRAM(s) Oral two times a day  spironolactone 25 milliGRAM(s) Oral daily    MEDICATIONS  (PRN):  albuterol/ipratropium for Nebulization 3 milliLiter(s) Nebulizer every 6 hours PRN Shortness of Breath and/or Wheezing  melatonin 3 milliGRAM(s) Oral at bedtime PRN Insomnia         Vitals log        ICU Vital Signs Last 24 Hrs  T(C): 36.9 (24 Aug 2023 05:34), Max: 36.9 (23 Aug 2023 14:31)  T(F): 98.5 (24 Aug 2023 05:34), Max: 98.5 (24 Aug 2023 05:34)  HR: 118 (24 Aug 2023 05:34) (102 - 123)  BP: 114/80 (24 Aug 2023 05:34) (105/60 - 143/70)  BP(mean): --  ABP: --  ABP(mean): --  RR: 18 (24 Aug 2023 05:34) (18 - 26)  SpO2: 93% (24 Aug 2023 05:34) (93% - 99%)    O2 Parameters below as of 24 Aug 2023 05:34  Patient On (Oxygen Delivery Method): nasal cannula  O2 Flow (L/min): 2               Input and Output:  I&O's Detail    23 Aug 2023 07:01  -  24 Aug 2023 05:51  --------------------------------------------------------  IN:  Total IN: 0 mL    OUT:    Other (mL): 4750 mL  Total OUT: 4750 mL    Total NET: -4750 mL          Lab Data                        14.6   12.47 )-----------( 536      ( 23 Aug 2023 06:18 )             44.1     08-23    x   |  x   |  x   ----------------------------<  x   5.2   |  x   |  x     Ca    8.5      23 Aug 2023 06:18  Phos  3.7     08-23  Mg     2.3     08-23    TPro  7.8  /  Alb  2.1<L>  /  TBili  0.8  /  DBili  x   /  AST  57<H>  /  ALT  36  /  AlkPhos  138<H>  08-23            Review of Systems	      Objective     Physical Examination    heart s1s2  lung dc BS  head nc      Pertinent Lab findings & Imaging      Austyn:  NO   Adequate UO     I&O's Detail    23 Aug 2023 07:01  -  24 Aug 2023 05:51  --------------------------------------------------------  IN:  Total IN: 0 mL    OUT:    Other (mL): 4750 mL  Total OUT: 4750 mL    Total NET: -4750 mL               Discussed with:     Cultures:	        Radiology

## 2023-08-24 NOTE — PROGRESS NOTE ADULT - PROBLEM SELECTOR PLAN 12
Concern for starting A/C on admission as pt reported hematochezia  - H/H WNL   - Restart heparin 5000U q12h    #GOC discussion - full code Concern for starting A/C on admission as pt reported hematochezia  - Restart heparin 5000U q12h    #GOC discussion - full code

## 2023-08-24 NOTE — PROGRESS NOTE ADULT - ASSESSMENT
67 y/o man visiting from [place], hx cirrhosis post esophageal varices banding 2020 2021, situs inversus, HF?, bradycardia, HTN, HLD, T2DM, seizure, GERD  Came to ER c/o 4-5 d weakness, malaise, LUQ abdominal pain, abdomen bloating  ED course:  Vitals: BP: 87/60-> 113/92, HR: 96, Temp: 97.9, RR: 18, SpO2: 98% on RA  Labs significant for: WBC 14.02, Hgb 12.9, Plt 471, Na 122, Cl 93, BUN 25, Cr 1.8, Glu 450, Alb 2.1, AST 51, eGFR 41, Lactate 3  UA: +glu >1000  CT a/p:  Total situs inversus. Cirrhosis w exophytic luis enhancing low density mass extending to diaphragm 5.2x4.1cm, add 5cm cyst, low density lesions too small to renetta. Mod ascites w abd carcinomatosis and implants noted up to 3.9cm    wife denies ever had imaging of abdomen done, reports first endoscopy was done due to "ulcer bleed"  dc Etoh and tobaco 1999  no night sweats fever anorecia weight loss, luis edema    -hx esophageal banding 2020, 2021, CT a/p w liver cirrhosis, 5,2cm exophytic liver mass mod scites and sig abdominal carcinomatosis  -agree worrisome for met hepatocellular carcinoma  -have ordered AFP, may still need tissue bx for diagnosis and NGS markers studies for potential targeted therapy  -GI consulted  AFP normal  s/p paracentesis  await cytology  continue present medical/ID and GI evaluation    further recommendations pending above  discussed w pt and family    thank you, will follow w you

## 2023-08-24 NOTE — CONSULT NOTE ADULT - CONSULT REQUESTED DATE/TIME
22-Aug-2023 09:49
22-Aug-2023 09:46
22-Aug-2023 12:03
22-Aug-2023 05:56
22-Aug-2023 12:12
22-Aug-2023 12:15
23-Aug-2023 12:57
24-Aug-2023 15:14

## 2023-08-24 NOTE — PROGRESS NOTE ADULT - PROBLEM SELECTOR PLAN 3
Na 122 on admission  - S/p 2L NS bolus in ED  - Na low 125 yesterday --> 121 today   - Continue PO fluid restriction   - Cr 1.8 today (1.7 yesterday)  - Hold home torsemide and ramipril given worsening Cr   - S/p lasix 20mg IV x2 for ascites (8/23)  - Give lasix x1 as per cardio, pt mildly overloaded   - Continue spironolactone as per above  - Monitor renal indices  - Nephro Dr. Harper following, f/u recs    #Hyperkalemia  - Potassium 4.6 today (5.6 yesterday)   - Replete w/ lokelma prn  - Nephro following Na 122 on admission  - S/p 2L NS bolus in ED  - Na low 125 yesterday --> 121 today however corrected for hyperglycemia = 125.  - Continue PO fluid restriction   - f/u repeat BMP  - Fluid restrict 1.2L daily.  - Started sodium tabs  - continue monitoring renal indices  - Nephro Dr. Harper following, f/u recs    #Hyperkalemia  - Potassium 4.6 today (5.6 yesterday)   - Replete w/ lokelma prn  - Nephro following Na 122 on admission -   hypervolumic  hyponatremia   - S/p 2L NS bolus in ED  - Na low 125 yesterday --> 121 today however corrected for hyperglycemia    / pseudohyponatremia    - Continue PO fluid restriction   - f/u repeat BMP  - Fluid restrict 1.2L daily.  - Started sodium tabs by renal   - Nephro Dr. Leroy denney,     #Hyperkalemia  - Potassium 4.6 today (5.6 yesterday)   - Replete w/ lokelma prn  - Nephro following- k wnl

## 2023-08-24 NOTE — PROGRESS NOTE ADULT - SUBJECTIVE AND OBJECTIVE BOX
Interval History:  complains of fatigue  Chart reviewed and events noted;   Overnight events:    MEDICATIONS  (STANDING):  carvedilol 3.125 milliGRAM(s) Oral every 12 hours  cefTRIAXone   IVPB 2000 milliGRAM(s) IV Intermittent every 24 hours  dextrose 5%. 1000 milliLiter(s) (50 mL/Hr) IV Continuous <Continuous>  dextrose 5%. 1000 milliLiter(s) (100 mL/Hr) IV Continuous <Continuous>  dextrose 50% Injectable 25 Gram(s) IV Push once  dextrose 50% Injectable 12.5 Gram(s) IV Push once  dextrose 50% Injectable 25 Gram(s) IV Push once  glucagon  Injectable 1 milliGRAM(s) IntraMuscular once  heparin   Injectable 5000 Unit(s) SubCutaneous every 12 hours  insulin glargine Injectable (LANTUS) 10 Unit(s) SubCutaneous every morning  insulin lispro (ADMELOG) corrective regimen sliding scale   SubCutaneous three times a day before meals  insulin lispro (ADMELOG) corrective regimen sliding scale   SubCutaneous at bedtime  insulin lispro Injectable (ADMELOG) 6 Unit(s) SubCutaneous before breakfast  insulin lispro Injectable (ADMELOG) 6 Unit(s) SubCutaneous before lunch  insulin lispro Injectable (ADMELOG) 6 Unit(s) SubCutaneous before dinner  phenytoin   Capsule 100 milliGRAM(s) Oral daily  rifAXIMin 550 milliGRAM(s) Oral two times a day  spironolactone 25 milliGRAM(s) Oral daily    MEDICATIONS  (PRN):  albuterol/ipratropium for Nebulization 3 milliLiter(s) Nebulizer every 6 hours PRN Shortness of Breath and/or Wheezing  melatonin 3 milliGRAM(s) Oral at bedtime PRN Insomnia      Vital Signs Last 24 Hrs  T(C): 37.4 (24 Aug 2023 11:50), Max: 37.4 (24 Aug 2023 11:50)  T(F): 99.4 (24 Aug 2023 11:50), Max: 99.4 (24 Aug 2023 11:50)  HR: 112 (24 Aug 2023 11:50) (102 - 123)  BP: 103/72 (24 Aug 2023 11:50) (103/72 - 134/93)  BP(mean): --  RR: 18 (24 Aug 2023 11:50) (18 - 26)  SpO2: 94% (24 Aug 2023 11:50) (93% - 99%)    Parameters below as of 24 Aug 2023 11:50  Patient On (Oxygen Delivery Method): nasal cannula  O2 Flow (L/min): 2      PHYSICAL EXAM  General: adult in NAD  HEENT: clear oropharynx, anicteric sclera, pink conjunctivae  Neck: supple  CV: normal S1S2 with no murmur rubs or gallops  Lungs: clear to auscultation, no wheezes, no rhales  Abdomen: soft non-tender +ascites no hepato/splenomegaly  Ext: no clubbing cyanosis or edema  Skin: no rashes and no petichiae  Neuro: alert and oriented X3 no focal deficits      LABS:  CBC Full  -  ( 24 Aug 2023 06:00 )  WBC Count : 14.87 K/uL  RBC Count : 5.25 M/uL  Hemoglobin : 14.1 g/dL  Hematocrit : 41.8 %  Platelet Count - Automated : 520 K/uL  Mean Cell Volume : 79.6 fl  Mean Cell Hemoglobin : 26.9 pg  Mean Cell Hemoglobin Concentration : 33.7 gm/dL  Auto Neutrophil # : 10.69 K/uL  Auto Lymphocyte # : 1.48 K/uL  Auto Monocyte # : 2.08 K/uL  Auto Eosinophil # : 0.35 K/uL  Auto Basophil # : 0.13 K/uL  Auto Neutrophil % : 71.8 %  Auto Lymphocyte % : 10.0 %  Auto Monocyte % : 14.0 %  Auto Eosinophil % : 2.4 %  Auto Basophil % : 0.9 %    08-24    121<L>  |  93<L>  |  39<H>  ----------------------------<  362<H>  4.6   |  18<L>  |  1.80<H>    Ca    8.4<L>      24 Aug 2023 06:00  Phos  4.2     08-24  Mg     2.4     08-24    TPro  7.3  /  Alb  2.0<L>  /  TBili  0.6  /  DBili  x   /  AST  52<H>  /  ALT  35  /  AlkPhos  151<H>  08-24    PT/INR - ( 23 Aug 2023 06:18 )   PT: 12.9 sec;   INR: 1.11 ratio             fe studies      WBC trend  14.87 K/uL (08-24-23 @ 06:00)  12.47 K/uL (08-23-23 @ 06:18)  14.06 K/uL (08-22-23 @ 07:33)  14.02 K/uL (08-21-23 @ 17:00)      Hgb trend  14.1 g/dL (08-24-23 @ 06:00)  14.6 g/dL (08-23-23 @ 06:18)  12.9 g/dL (08-22-23 @ 07:33)  12.9 g/dL (08-21-23 @ 17:00)      plt trend  520 K/uL (08-24-23 @ 06:00)  536 K/uL (08-23-23 @ 06:18)  459 K/uL (08-22-23 @ 07:33)  471 K/uL (08-21-23 @ 17:00)        RADIOLOGY & ADDITIONAL STUDIES:

## 2023-08-24 NOTE — CONSULT NOTE ADULT - CONSULT REASON
history of CHF
SBP
Hyponatremia
HCC/cirrhosis/ascites
liver mass, abdominal carcinomatosis
cirrhosis  liver ca
dm2 uncontrolled
68y A1C with Estimated Average Glucose Result: 9.4 % (08-22-23 @ 07:33)   diabetes mellitus uncontrolled type 2

## 2023-08-24 NOTE — PROGRESS NOTE ADULT - PROBLEM SELECTOR PLAN 6
Patient reports one episode of red bright blood in the stools, denies history of hemorrhoids.   - Patient had 1 bloody BM since admission (8/23)  - F/u FOBT   - No intervention at this time as Hgb is WNL - f/u H/H   - GI Dr. Devi following, f/u recs As per family, Pt has known Hx of HF w/ EF ~30-40%  - Continue home coreg   - D/michelle digoxin as per cardio  - Digoxin level WNL (8/22)   - F/u repeat digoxin level - if normal, consider restarting digoxin   - S/p Lasix 20mg IV x2 for symptomatic ascites   - Continue home spironolactone  - Hold home torsemide due to MAIA and hyponatremia  - F/u TTE   - Cardio Dr. Swift group consulted

## 2023-08-24 NOTE — PROGRESS NOTE ADULT - PROBLEM SELECTOR PLAN 11
Chronic, however on admission presented with hypotension SBP the 80's that improved with IVF   - Continue home coreg with holding parameters   - Hold home torsemide and ramipril in setting of MAIA and hyponatremia    #HLD  Chronic  - Hold home statins due to transaminitis

## 2023-08-24 NOTE — PROGRESS NOTE ADULT - PROBLEM SELECTOR PLAN 4
On admission patient met sepsis criteria due to hypotension, leukocytosis, tachy and elevated lactate. Unclear source possible  SBP  - S/p 2L NS bolus and Rocephin in ED  - Continue Rocephin 2g q24hm to cover SBP as per ID   - Lactate 3 on admission --> repeat lactate WNL   - F/u BCx  - Trend WBC and monitor for fever  - ID Dr. Allred consulted On admission patient met sepsis criteria due to hypotension, leukocytosis, tachy and elevated lactate. Unclear source possible SBP  - S/p 2L NS bolus and Rocephin in ED  - Continue Rocephin 2g q24hm to cover SBP as per ID   - Lactate 3 on admission --> repeat lactate WNL   - F/u BCx - pending collection  - Trend WBC and monitor for fever  - ID Dr. Allred consulted On admission patient met sepsis criteria due to hypotension, leukocytosis, tachy and elevated lactate. Unclear source possible SBP  - S/p 2L NS bolus and Rocephin in ED  - Continue Rocephin 2g q24hm to cover SBP as per ID - fluid cult neg todate   - Lactate 3 on admission --> repeat lactate WNL   - F/u BCx - pending collection  - Trend WBC and monitor for fever  - ID Dr. Allred consulted

## 2023-08-24 NOTE — PROGRESS NOTE ADULT - SUBJECTIVE AND OBJECTIVE BOX
Patient is a 68y old  Male who presents with a chief complaint of Liver Cirrhosis with ascites, Liver cancer, hyponatremia (23 Aug 2023 11:55)    Patient seen in follow up for hyponatremia.        PAST MEDICAL HISTORY:  T2DM (type 2 diabetes mellitus)    History of seizure    History of bradycardia    GERD (gastroesophageal reflux disease)    History of cirrhosis of liver    HLD (hyperlipidemia)    HTN (hypertension)      T(C): 37.4 (24 Aug 2023 11:50), Max: 37.4 (24 Aug 2023 11:50)  T(F): 99.4 (24 Aug 2023 11:50), Max: 99.4 (24 Aug 2023 11:50)  HR: 112 (24 Aug 2023 11:50) (102 - 123)  BP: 103/72 (24 Aug 2023 11:50) (103/72 - 118/76)  BP(mean): --  RR: 18 (24 Aug 2023 11:50) (18 - 26)  SpO2: 94% (24 Aug 2023 11:50) (93% - 99%)MEDICATIONS  (STANDING):  carvedilol 3.125 milliGRAM(s) Oral every 12 hours  cefTRIAXone   IVPB 2000 milliGRAM(s) IV Intermittent every 24 hours  dextrose 5%. 1000 milliLiter(s) (50 mL/Hr) IV Continuous <Continuous>  dextrose 5%. 1000 milliLiter(s) (100 mL/Hr) IV Continuous <Continuous>  dextrose 50% Injectable 25 Gram(s) IV Push once  dextrose 50% Injectable 12.5 Gram(s) IV Push once  dextrose 50% Injectable 25 Gram(s) IV Push once  glucagon  Injectable 1 milliGRAM(s) IntraMuscular once  heparin   Injectable 5000 Unit(s) SubCutaneous every 12 hours  insulin glargine Injectable (LANTUS) 10 Unit(s) SubCutaneous every morning  insulin lispro (ADMELOG) corrective regimen sliding scale   SubCutaneous at bedtime  insulin lispro (ADMELOG) corrective regimen sliding scale   SubCutaneous three times a day before meals  insulin lispro Injectable (ADMELOG) 6 Unit(s) SubCutaneous before breakfast  insulin lispro Injectable (ADMELOG) 6 Unit(s) SubCutaneous before lunch  insulin lispro Injectable (ADMELOG) 6 Unit(s) SubCutaneous before dinner  phenytoin   Capsule 100 milliGRAM(s) Oral daily  rifAXIMin 550 milliGRAM(s) Oral two times a day  sodium chloride 2 Gram(s) Oral every 6 hours  spironolactone 25 milliGRAM(s) Oral daily    MEDICATIONS  (PRN):  albuterol/ipratropium for Nebulization 3 milliLiter(s) Nebulizer every 6 hours PRN Shortness of Breath and/or Wheezing  melatonin 3 milliGRAM(s) Oral at bedtime PRN Insomnia    PHYSICAL EXAM:  General: No distress  Respiratory: b/l air entry  Cardiovascular: S1 S2  Gastrointestinal: soft  Extremities:  no edema                            LABS:                        14.1   14.87 )-----------( 520      ( 24 Aug 2023 06:00 )             41.8     CBC Full  -  ( 24 Aug 2023 06:00 )  WBC Count : 14.87 K/uL  Hemoglobin : 14.1 g/dL  Hematocrit : 41.8 %  Platelet Count - Automated : 520 K/uL    24 Aug 2023 06:00    121    |  93     |  39     ----------------------------<  362    4.6     |  18     |  1.80     Ca    8.4        24 Aug 2023 06:00  Phos  4.2       24 Aug 2023 06:00  Mg     2.4       24 Aug 2023 06:00    TPro  7.3    /  Alb  2.0    /  TBili  0.6    /  DBili  x      /  AST  52     /  ALT  35     /  AlkPhos  151    24 Aug 2023 06:00    PT/INR - ( 23 Aug 2023 06:18 )   PT: 12.9 sec;   INR: 1.11 ratio

## 2023-08-24 NOTE — PROGRESS NOTE ADULT - ASSESSMENT
68y old male who presents with a chief complaint of Liver Cirrhosis with ascites, Liver cancer, and hyponatremia.     CHF  - remains mildly overloaded this AM,  tachypneic,  - would challenged with Lasix 40 mg IVP x 1 today, will reassess in AM    - with known hx  HFrEF per family at bedside, does not have any providers here as he resides in [facility]   - Telemetry - 120's   - would obtain CT chest   - Results of previous TTE not available. Family reports LVEF of 25-30% in 2020.  - f/u TTE  - continue Spironolactone  - on home digoxin 0.125mg M-F for HF, denies any hx of arrhythmia or Afib.  - digoxin level : 0.8 (8/22)     - No clear evidence of acute ischemia.  - Repeat EKG with leads in positions appropriate for situs inversus.  - Hold home medication atorvastatin 10mg in the setting of liver cirrhosis  - s/p Paracentesis with 4.7 L ascitic fluid removal, sent for path, GI following     - BP controlled, monitor routine hemodynamics.  - Continue Coreg 3.125mg BID, with hold parameters   - home ACEi on hold d/t MAIA, renal following   - Monitor and replete Lytes. Keep K > 4 and Mg > 2    - Will continue to follow.    Radha Ventura [facility]  Nurse Practitioner - Cardiology   call TEAMS

## 2023-08-24 NOTE — CONSULT NOTE ADULT - GENERAL
"Brief Note:  Recommendations for Provider:  LBM recorded 12/22, please increase bowel regimen     Calorie Count  Intake recorded for: 12/29  Total Kcals: 0 Total Protein: 0g  # Meals Ordered from Kitchen: 1  # Meals Recorded: 1 - refused to eat, even home food in refrigerator  # Supplements Recorded: 0    Updates:  - LBM recorded 12/22  - did not fill out menus with meal selections past 2 days  - has not yet tried Magic Cup or Gelatein (ordered 12/27 for trial)  - spoke with Massimo this morning while HD running, he says he feels nauseous and is recovering from his procedure yesterday so he did not eat anything yesterday. Says he does not anticipate being able to eat anything today for fear of getting sick and \"it's not that I don't want to eat, I just don't know if I can.\"   - Encouraged him to see how he is feeling today and try to eat something if he is feeling less nauseous later. Reminded of PRN anti-emetics.    Interventions:  - Discontinued Nepro and Ensure Plus High Protein - has large stock in room  - Ordered PRN supplements - any kind    Philly Solis RDN, LD  Clinical Dietitian  " details…

## 2023-08-24 NOTE — PROGRESS NOTE ADULT - PROBLEM SELECTOR PLAN 8
Pt with questionable hx of HF, family states unsure hx of HF   - Continue home coreg   - D/michelle digoxin as per cardio  - Digoxin level WNL (8/22)   - F/u repeat digoxin level - if normal, consider restarting digoxin   - S/p Lasix 20mg IV x2 for symptomatic ascites   - Continue home spironolactone  - Hold home torsemide due to MAIA and hyponatremia  - F/u TTE   - Cardio Dr. Swift group consulted Patient reports one episode of red bright blood in the stools, denies history of hemorrhoids.   - Patient had 1 bloody BM since admission (8/23)  - F/u FOBT - pending collection  - No intervention at this time as Hgb is WNL - f/u H/H   - GI (Dr. Devi) following, f/u recs Patient reports one episode of red bright blood in the stools, denies history of hemorrhoids.   - Patient had 1 bloody BM since admission (8/23)  - no further bleed noticed   - No intervention at this time as Hgb is WNL - f/u H/H closely   - GI (Dr. Devi) following,

## 2023-08-24 NOTE — PROGRESS NOTE ADULT - ASSESSMENT
[facility]   INFECTIOUS DISEASES   [address] [place] [address]  Tel: [phone]     Fax: [phone]  =========================================================  MD Marco Weber Kaushal, MD Cho, Michelle, MD Sunjit, Jaspal, MD  =========================================================    HERMANN XAVIER [id number]    Follow up:     Allergies:  No Known Allergies      Antibiotics:  albuterol/ipratropium for Nebulization 3 milliLiter(s) Nebulizer every 6 hours PRN  carvedilol 3.125 milliGRAM(s) Oral every 12 hours  cefTRIAXone   IVPB 2000 milliGRAM(s) IV Intermittent every 24 hours  dextrose 5%. 1000 milliLiter(s) IV Continuous <Continuous>  dextrose 5%. 1000 milliLiter(s) IV Continuous <Continuous>  dextrose 50% Injectable 25 Gram(s) IV Push once  dextrose 50% Injectable 12.5 Gram(s) IV Push once  dextrose 50% Injectable 25 Gram(s) IV Push once  glucagon  Injectable 1 milliGRAM(s) IntraMuscular once  heparin   Injectable 5000 Unit(s) SubCutaneous every 12 hours  insulin glargine Injectable (LANTUS) 10 Unit(s) SubCutaneous every morning  insulin lispro (ADMELOG) corrective regimen sliding scale   SubCutaneous three times a day before meals  insulin lispro (ADMELOG) corrective regimen sliding scale   SubCutaneous at bedtime  insulin lispro Injectable (ADMELOG) 6 Unit(s) SubCutaneous before lunch  insulin lispro Injectable (ADMELOG) 6 Unit(s) SubCutaneous before breakfast  insulin lispro Injectable (ADMELOG) 6 Unit(s) SubCutaneous before dinner  melatonin 3 milliGRAM(s) Oral at bedtime PRN  phenytoin   Capsule 100 milliGRAM(s) Oral daily  rifAXIMin 550 milliGRAM(s) Oral two times a day  spironolactone 25 milliGRAM(s) Oral daily       REVIEW OF SYSTEMS:  CONSTITUTIONAL:  No Fever or chills  HEENT:   No diplopia or blurred vision.  No earache, sore throat or runny nose.  CARDIOVASCULAR:  No chest pain or SOB  RESPIRATORY:  No cough, shortness of breath, PND or orthopnea.  GASTROINTESTINAL:  No nausea, vomiting or diarrhea.  GENITOURINARY:  No dysuria, frequency or urgency. No Blood in urine  MUSCULOSKELETAL:  no joint aches, no muscle pain  SKIN:  No change in skin, hair or nails.  NEUROLOGIC:  No paresthesias or weakness.  PSYCHIATRIC:  No disorder of thought or mood.  ENDOCRINE:  No heat or cold intolerance, polyuria or polydipsia.  HEMATOLOGICAL:  No easy bruising or bleeding.      Physical Exam:  ICU Vital Signs Last 24 Hrs  T(C): 36.9 (24 Aug 2023 05:34), Max: 36.9 (23 Aug 2023 14:31)  T(F): 98.5 (24 Aug 2023 05:34), Max: 98.5 (24 Aug 2023 05:34)  HR: 118 (24 Aug 2023 05:34) (102 - 123)  BP: 114/80 (24 Aug 2023 05:34) (105/60 - 143/70)  BP(mean): --  ABP: --  ABP(mean): --  RR: 18 (24 Aug 2023 05:34) (18 - 26)  SpO2: 93% (24 Aug 2023 05:34) (93% - 99%)    O2 Parameters below as of 24 Aug 2023 05:34  Patient On (Oxygen Delivery Method): nasal cannula  O2 Flow (L/min): 2         GEN: NAD  HEENT: normocephalic and atraumatic. EOMI. MANJINDER.    NECK: Supple.  No lymphadenopathy   LUNGS: Clear to auscultation.  HEART: Regular rate and rhythm without murmur.  ABDOMEN: Soft, nontender, and nondistended.  Positive bowel sounds.    : No CVA tenderness  EXTREMITIES: Without edema.  MSK: no joint swelling  NEUROLOGIC: grossly intact.  PSYCHIATRIC: Appropriate affect .  SKIN: No rash     Labs:  08-24    121<L>  |  93<L>  |  39<H>  ----------------------------<  362<H>  4.6   |  18<L>  |  1.80<H>    Ca    8.4<L>      24 Aug 2023 06:00  Phos  4.2     08-24  Mg     2.4     08-24    TPro  7.3  /  Alb  2.0<L>  /  TBili  0.6  /  DBili  x   /  AST  52<H>  /  ALT  35  /  AlkPhos  151<H>  08-24                          14.1   14.87 )-----------( 520      ( 24 Aug 2023 06:00 )             41.8     PT/INR - ( 23 Aug 2023 06:18 )   PT: 12.9 sec;   INR: 1.11 ratio           Urinalysis Basic - ( 24 Aug 2023 06:00 )    Color: x / Appearance: x / SG: x / pH: x  Gluc: 362 mg/dL / Ketone: x  / Bili: x / Urobili: x   Blood: x / Protein: x / Nitrite: x   Leuk Esterase: x / RBC: x / WBC x   Sq Epi: x / Non Sq Epi: x / Bacteria: x      LIVER FUNCTIONS - ( 24 Aug 2023 06:00 )  Alb: 2.0 g/dL / Pro: 7.3 g/dL / ALK PHOS: 151 U/L / ALT: 35 U/L / AST: 52 U/L / GGT: x             RECENT CULTURES:  08-23 @ 12:35 Ascites Fl Ascites Fluid       polymorphonuclear leukocytes seen  No organisms seen  by cytocentrifuge      08-23 @ 09:50 .Body Fluid Other, Peritoneal Fluid       polymorphonuclear leukocytes seen  No organisms seen  by cytocentrifuge            All imaging and data are reviewed.     Assessment and Plan:       Krystle Allred MD  Division of Infectious Diseases   Please call ID service at [phone] with any question.      35 minutes spent on total encounter assessing patient, examination, chart review, counseling and coordinating care by the attending physician/nurse/care manager.

## 2023-08-24 NOTE — CONSULT NOTE ADULT - ASSESSMENT
Physical Exam:   Vital Signs Last 24 Hrs  T(C): 37.4 (24 Aug 2023 11:50), Max: 37.4 (24 Aug 2023 11:50)  T(F): 99.4 (24 Aug 2023 11:50), Max: 99.4 (24 Aug 2023 11:50)  HR: 112 (24 Aug 2023 11:50) (112 - 123)  BP: 103/72 (24 Aug 2023 11:50) (103/72 - 117/75)  BP(mean): --  RR: 18 (24 Aug 2023 11:50) (18 - 18)  SpO2: 94% (24 Aug 2023 11:50) (93% - 94%)    Parameters below as of 24 Aug 2023 11:50  Patient On (Oxygen Delivery Method): nasal cannula  O2 Flow (L/min): 2       CAPILLARY BLOOD GLUCOSE      POCT Blood Glucose.: 163 mg/dL (24 Aug 2023 11:36)  POCT Blood Glucose.: 320 mg/dL (24 Aug 2023 07:34)  POCT Blood Glucose.: 227 mg/dL (23 Aug 2023 21:25)  POCT Blood Glucose.: 227 mg/dL (23 Aug 2023 17:09)      Cholesterol, Serum: 113 mg/dL (05.19.21 @ 08:36)     HDL Cholesterol, Serum: 22 mg/dL (05.19.21 @ 08:36)     LDL Cholesterol Calculated: 66 mg/dL (05.19.21 @ 08:36)     DIET: CC  >50%

## 2023-08-24 NOTE — CONSULT NOTE ADULT - PROBLEM SELECTOR RECOMMENDATION 9
oral anti-diabetes agents on hold  cont lantus 5 units qhs  cont admelog 3 units 3x/day before meals  cont admelog corrective scale coverage qac/qhs  cont cons cho diet  goal bg 100-180 in hosp setting
Type 2 A1c 9.4% adm liver CA  increase Lantus to 10 units in AM  increase admelog premeal to 6 units  c/w mod ISS  c/w CC diet and accucheck ACHS  Recommend endocrine-Perlman onconsult  FU appt: TBA, pt returning to [facility]  DSC recommendations: return to home regimen + basal insulin, and increase glucose monitoring, lifestyle modifications  diabetes education provided as documented above  Diabetes support info and cell # [phone] given   Goal 100-180 mg/dL; 140-180 mg/dL in critical care areas

## 2023-08-24 NOTE — PROGRESS NOTE ADULT - PROBLEM SELECTOR PLAN 2
- CT A/P: Highly suspicious for primary HCC versus metastasis. Peritoneal carcinomatosis and moderate volume ascites.  - AFP 4.2 WNL    - Protein/Cr ratio WNL  - Heme/Onc Dr. Winter group following - may still need tissue bx for diagnosis and NGS markers studies for potential targeted therapy  - GI Dr. Devi following - CT A/P: Highly suspicious for primary HCC versus metastasis. Peritoneal carcinomatosis and moderate volume ascites.  - AFP 4.2 WNL    - Protein/Cr ratio WNL  - Heme/Onc (Dr. Winter group) following - may still need tissue bx for diagnosis and NGS markers studies for potential targeted therapy  - GI (Dr. Devi) following need tissue  biopsy  out pt  to confirm diagnosis   - CT A/P: Highly suspicious for primary HCC versus metastasis. Peritoneal carcinomatosis and moderate volume ascites.  - AFP 4.2 WNL    - Protein/Cr ratio WNL  - Heme/Onc (Dr. Winter group) following - may still need tissue bx for diagnosis and NGS markers studies for potential targeted therapy  - GI (Dr. Devi) following

## 2023-08-24 NOTE — PROGRESS NOTE ADULT - SUBJECTIVE AND OBJECTIVE BOX
[facility]   INFECTIOUS DISEASES   [address] [place] [address]  Tel: [phone]     Fax: [phone]  =========================================================  MD Marco Weber Kaushal, MD Cho, Michelle, MD Sunjit, Jaspal, MD  =========================================================    HERMANN XAVIER [id number]    Follow up: SBP. No overnight events occurred Patient went for IR paracentesis yesterday where 4750cc tram colored fluid was removed. Today patient is feeling better. His stomach is less distended and feels more soft.     Allergies:  No Known Allergies      Antibiotics:  albuterol/ipratropium for Nebulization 3 milliLiter(s) Nebulizer every 6 hours PRN  carvedilol 3.125 milliGRAM(s) Oral every 12 hours  cefTRIAXone   IVPB 2000 milliGRAM(s) IV Intermittent every 24 hours  dextrose 5%. 1000 milliLiter(s) IV Continuous <Continuous>  dextrose 5%. 1000 milliLiter(s) IV Continuous <Continuous>  dextrose 50% Injectable 25 Gram(s) IV Push once  dextrose 50% Injectable 12.5 Gram(s) IV Push once  dextrose 50% Injectable 25 Gram(s) IV Push once  glucagon  Injectable 1 milliGRAM(s) IntraMuscular once  heparin   Injectable 5000 Unit(s) SubCutaneous every 12 hours  insulin glargine Injectable (LANTUS) 10 Unit(s) SubCutaneous every morning  insulin lispro (ADMELOG) corrective regimen sliding scale   SubCutaneous three times a day before meals  insulin lispro (ADMELOG) corrective regimen sliding scale   SubCutaneous at bedtime  insulin lispro Injectable (ADMELOG) 6 Unit(s) SubCutaneous before lunch  insulin lispro Injectable (ADMELOG) 6 Unit(s) SubCutaneous before breakfast  insulin lispro Injectable (ADMELOG) 6 Unit(s) SubCutaneous before dinner  melatonin 3 milliGRAM(s) Oral at bedtime PRN  phenytoin   Capsule 100 milliGRAM(s) Oral daily  rifAXIMin 550 milliGRAM(s) Oral two times a day  spironolactone 25 milliGRAM(s) Oral daily       REVIEW OF SYSTEMS:  CONSTITUTIONAL:  No Fever or chills  HEENT:   No diplopia or blurred vision.  No earache, sore throat or runny nose.  CARDIOVASCULAR:  No chest pain or SOB  RESPIRATORY:  No cough, shortness of breath, PND or orthopnea.  GASTROINTESTINAL:  No nausea, vomiting or diarrhea.  GENITOURINARY:  No dysuria, frequency or urgency. No Blood in urine  MUSCULOSKELETAL:  no joint aches, no muscle pain  SKIN:  No change in skin, hair or nails.  NEUROLOGIC:  No paresthesias or weakness.  PSYCHIATRIC:  No disorder of thought or mood.  ENDOCRINE:  No heat or cold intolerance, polyuria or polydipsia.  HEMATOLOGICAL:  No easy bruising or bleeding.      Physical Exam:  ICU Vital Signs Last 24 Hrs  T(C): 36.9 (24 Aug 2023 05:34), Max: 36.9 (23 Aug 2023 14:31)  T(F): 98.5 (24 Aug 2023 05:34), Max: 98.5 (24 Aug 2023 05:34)  HR: 118 (24 Aug 2023 05:34) (102 - 123)  BP: 114/80 (24 Aug 2023 05:34) (105/60 - 143/70)  BP(mean): --  ABP: --  ABP(mean): --  RR: 18 (24 Aug 2023 05:34) (18 - 26)  SpO2: 93% (24 Aug 2023 05:34) (93% - 99%)    O2 Parameters below as of 24 Aug 2023 05:34  Patient On (Oxygen Delivery Method): nasal cannula  O2 Flow (L/min): 2         GEN: NAD  HEENT: normocephalic and atraumatic. EOMI. MANJINDER.    NECK: Supple.  No lymphadenopathy   LUNGS: Clear to auscultation.  HEART: Regular rate and rhythm without murmur.  ABDOMEN: Distended and firm, but improved from yesterday s/p IR paracentesis    : No CVA tenderness  EXTREMITIES: Without edema.  MSK: no joint swelling  NEUROLOGIC: grossly intact.  PSYCHIATRIC: Appropriate affect .  SKIN: No rash     Labs:  08-24    121<L>  |  93<L>  |  39<H>  ----------------------------<  362<H>  4.6   |  18<L>  |  1.80<H>    Ca    8.4<L>      24 Aug 2023 06:00  Phos  4.2     08-24  Mg     2.4     08-24    TPro  7.3  /  Alb  2.0<L>  /  TBili  0.6  /  DBili  x   /  AST  52<H>  /  ALT  35  /  AlkPhos  151<H>  08-24                          14.1   14.87 )-----------( 520      ( 24 Aug 2023 06:00 )             41.8     PT/INR - ( 23 Aug 2023 06:18 )   PT: 12.9 sec;   INR: 1.11 ratio           Urinalysis Basic - ( 24 Aug 2023 06:00 )    Color: x / Appearance: x / SG: x / pH: x  Gluc: 362 mg/dL / Ketone: x  / Bili: x / Urobili: x   Blood: x / Protein: x / Nitrite: x   Leuk Esterase: x / RBC: x / WBC x   Sq Epi: x / Non Sq Epi: x / Bacteria: x      LIVER FUNCTIONS - ( 24 Aug 2023 06:00 )  Alb: 2.0 g/dL / Pro: 7.3 g/dL / ALK PHOS: 151 U/L / ALT: 35 U/L / AST: 52 U/L / GGT: x             RECENT CULTURES:  08-23 @ 12:35 Ascites Fl Ascites Fluid       polymorphonuclear leukocytes seen  No organisms seen  by cytocentrifuge      08-23 @ 09:50 .Body Fluid Other, Peritoneal Fluid       polymorphonuclear leukocytes seen  No organisms seen  by cytocentrifuge            All imaging and data are reviewed.   IR Paracentesis 08/23/23  INTERPRETATION:  History: Ascites.    Procedure: The procedure, risks, benefits, and alternatives were   discussed with the patient in the presence of an Interventional Radiology   nurse and informed consent was placed in the chart.    The patient was placed in the supine position on the angio suite bed and   a time out was performed.    The patient's right lower quadrant was prepped and draped in a normal   sterile fashion. Under ultrasound guidance, a 8.5 [place]ian multiside hole   catheter was advanced into the ascites.  4750 cc of tram-colored fluid was aspirated.    The catheter was removed and Dermabond was applied.    The patient  tolerated the procedure without complication and left the   suite in stable condition.    Findings:    Ultrasound demonstrates ascites in the right lower quadrant.    Impression:  SUCCESSFUL MODERATE VOLUME PARACENTESIS AS DESCRIBED.        CT Ab/ Pelvis 08/21/23  FINDINGS:  LOWER CHEST: Total situs inversus.    LIVER: Cirrhosis. A segment 8/4 A exophytic peripherally enhancing   low-density mass extends superiorly to the diaphragm, measuring   approximately 5.2 x 4.1 cm. Additional 5 cm cyst low-density lesions that   are too small to characterize.  BILE DUCTS: Normal caliber.  GALLBLADDER: Within normal limits.  SPLEEN: Within normal limits.  PANCREAS: Within normal limits.  ADRENALS: Within normal limits.  KIDNEYS/URETERS: Within normal limits.    BLADDER: Within normal limits.  REPRODUCTIVE ORGANS: Mildly prominent prostate gland.    BOWEL: No bowel obstruction.  PERITONEUM: Moderate volume ascites with soft tissue implants diffusely   along the peritoneum as well as the serosal surface of the sigmoid colon.   A perisigmoid implant measures approximately 3.9 x 2.8 cm in greatest   transaxial dimension. Soft tissue implants are noted along the falciform   ligament. Omental nodularity is present.  VESSELS: Patent portal, superior mesenteric, and splenic veins. Diffuse   abdominal collaterals..  RETROPERITONEUM/LYMPH NODES: No lymphadenopathy.  ABDOMINAL WALL: Left inguinal hernia containing fluid and fat.  BONES: Degenerative changes.    IMPRESSION: Total situs inversus.  Cirrhosis with a dominant liver mass concerning for primary HCC versus   metastasis.  Peritoneal carcinomatosis and moderate volume ascites.    Assessment and Plan:       Krystle Allred MD  Division of Infectious Diseases   Please call ID service at [phone] with any question.      35 minutes spent on total encounter assessing patient, examination, chart review, counseling and coordinating care by the attending physician/nurse/care manager.   [facility]   INFECTIOUS DISEASES   [address] [place] [address]  Tel: [phone]     Fax: [phone]  =========================================================  MD Marco Weber Kaushal, MD Cho, Michelle, MD Sunjit, Jaspal, MD  =========================================================    HERMANN XAVIER [id number]    Follow up: SBP. No overnight events occurred Patient went for IR paracentesis yesterday where 4750cc tram colored fluid was removed. Today patient is feeling better. His stomach is less distended and feels more soft.     Allergies:  No Known Allergies      Antibiotics:  albuterol/ipratropium for Nebulization 3 milliLiter(s) Nebulizer every 6 hours PRN  carvedilol 3.125 milliGRAM(s) Oral every 12 hours  cefTRIAXone   IVPB 2000 milliGRAM(s) IV Intermittent every 24 hours  dextrose 5%. 1000 milliLiter(s) IV Continuous <Continuous>  dextrose 5%. 1000 milliLiter(s) IV Continuous <Continuous>  dextrose 50% Injectable 25 Gram(s) IV Push once  dextrose 50% Injectable 12.5 Gram(s) IV Push once  dextrose 50% Injectable 25 Gram(s) IV Push once  glucagon  Injectable 1 milliGRAM(s) IntraMuscular once  heparin   Injectable 5000 Unit(s) SubCutaneous every 12 hours  insulin glargine Injectable (LANTUS) 10 Unit(s) SubCutaneous every morning  insulin lispro (ADMELOG) corrective regimen sliding scale   SubCutaneous three times a day before meals  insulin lispro (ADMELOG) corrective regimen sliding scale   SubCutaneous at bedtime  insulin lispro Injectable (ADMELOG) 6 Unit(s) SubCutaneous before lunch  insulin lispro Injectable (ADMELOG) 6 Unit(s) SubCutaneous before breakfast  insulin lispro Injectable (ADMELOG) 6 Unit(s) SubCutaneous before dinner  melatonin 3 milliGRAM(s) Oral at bedtime PRN  phenytoin   Capsule 100 milliGRAM(s) Oral daily  rifAXIMin 550 milliGRAM(s) Oral two times a day  spironolactone 25 milliGRAM(s) Oral daily       REVIEW OF SYSTEMS:  CONSTITUTIONAL:  No Fever or chills  HEENT:   No diplopia or blurred vision.  No earache, sore throat or runny nose.  CARDIOVASCULAR:  No chest pain or SOB  RESPIRATORY:  No cough, shortness of breath, PND or orthopnea.  GASTROINTESTINAL:  No nausea, vomiting or diarrhea.  GENITOURINARY:  No dysuria, frequency or urgency. No Blood in urine  MUSCULOSKELETAL:  no joint aches, no muscle pain  SKIN:  No change in skin, hair or nails.  NEUROLOGIC:  No paresthesias or weakness.  PSYCHIATRIC:  No disorder of thought or mood.  ENDOCRINE:  No heat or cold intolerance, polyuria or polydipsia.  HEMATOLOGICAL:  No easy bruising or bleeding.      Physical Exam:  ICU Vital Signs Last 24 Hrs  T(C): 36.9 (24 Aug 2023 05:34), Max: 36.9 (23 Aug 2023 14:31)  T(F): 98.5 (24 Aug 2023 05:34), Max: 98.5 (24 Aug 2023 05:34)  HR: 118 (24 Aug 2023 05:34) (102 - 123)  BP: 114/80 (24 Aug 2023 05:34) (105/60 - 143/70)  BP(mean): --  ABP: --  ABP(mean): --  RR: 18 (24 Aug 2023 05:34) (18 - 26)  SpO2: 93% (24 Aug 2023 05:34) (93% - 99%)    O2 Parameters below as of 24 Aug 2023 05:34  Patient On (Oxygen Delivery Method): nasal cannula  O2 Flow (L/min): 2         GEN: NAD  HEENT: normocephalic and atraumatic. EOMI. MANJINDER.    NECK: Supple.  No lymphadenopathy   LUNGS: Clear to auscultation.  HEART: Regular rate and rhythm without murmur.  ABDOMEN: Distended and firm, but improved from yesterday s/p IR paracentesis    : No CVA tenderness  EXTREMITIES: Without edema.  MSK: no joint swelling  NEUROLOGIC: grossly intact.  PSYCHIATRIC: Appropriate affect .  SKIN: No rash     Labs:  08-24    121<L>  |  93<L>  |  39<H>  ----------------------------<  362<H>  4.6   |  18<L>  |  1.80<H>    Ca    8.4<L>      24 Aug 2023 06:00  Phos  4.2     08-24  Mg     2.4     08-24    TPro  7.3  /  Alb  2.0<L>  /  TBili  0.6  /  DBili  x   /  AST  52<H>  /  ALT  35  /  AlkPhos  151<H>  08-24                          14.1   14.87 )-----------( 520      ( 24 Aug 2023 06:00 )             41.8     PT/INR - ( 23 Aug 2023 06:18 )   PT: 12.9 sec;   INR: 1.11 ratio           Urinalysis Basic - ( 24 Aug 2023 06:00 )    Color: x / Appearance: x / SG: x / pH: x  Gluc: 362 mg/dL / Ketone: x  / Bili: x / Urobili: x   Blood: x / Protein: x / Nitrite: x   Leuk Esterase: x / RBC: x / WBC x   Sq Epi: x / Non Sq Epi: x / Bacteria: x      LIVER FUNCTIONS - ( 24 Aug 2023 06:00 )  Alb: 2.0 g/dL / Pro: 7.3 g/dL / ALK PHOS: 151 U/L / ALT: 35 U/L / AST: 52 U/L / GGT: x             RECENT CULTURES:  08-23 @ 12:35 Ascites Fl Ascites Fluid       polymorphonuclear leukocytes seen  No organisms seen  by cytocentrifuge      08-23 @ 09:50 .Body Fluid Other, Peritoneal Fluid       polymorphonuclear leukocytes seen  No organisms seen  by cytocentrifuge            All imaging and data are reviewed.   IR Paracentesis 08/23/23  INTERPRETATION:  History: Ascites.    Procedure: The procedure, risks, benefits, and alternatives were   discussed with the patient in the presence of an Interventional Radiology   nurse and informed consent was placed in the chart.    The patient was placed in the supine position on the angio suite bed and   a time out was performed.    The patient's right lower quadrant was prepped and draped in a normal   sterile fashion. Under ultrasound guidance, a 8.5 [place] multiside hole   catheter was advanced into the ascites.  4750 cc of tram-colored fluid was aspirated.    The catheter was removed and Dermabond was applied.    The patient  tolerated the procedure without complication and left the   suite in stable condition.    Findings:    Ultrasound demonstrates ascites in the right lower quadrant.    Impression:  SUCCESSFUL MODERATE VOLUME PARACENTESIS AS DESCRIBED.      CT Ab/ Pelvis 08/21/23  FINDINGS:  LOWER CHEST: Total situs inversus.    LIVER: Cirrhosis. A segment 8/4 A exophytic peripherally enhancing   low-density mass extends superiorly to the diaphragm, measuring   approximately 5.2 x 4.1 cm. Additional 5 cm cyst low-density lesions that   are too small to characterize.  BILE DUCTS: Normal caliber.  GALLBLADDER: Within normal limits.  SPLEEN: Within normal limits.  PANCREAS: Within normal limits.  ADRENALS: Within normal limits.  KIDNEYS/URETERS: Within normal limits.    BLADDER: Within normal limits.  REPRODUCTIVE ORGANS: Mildly prominent prostate gland.    BOWEL: No bowel obstruction.  PERITONEUM: Moderate volume ascites with soft tissue implants diffusely   along the peritoneum as well as the serosal surface of the sigmoid colon.   A perisigmoid implant measures approximately 3.9 x 2.8 cm in greatest   transaxial dimension. Soft tissue implants are noted along the falciform   ligament. Omental nodularity is present.  VESSELS: Patent portal, superior mesenteric, and splenic veins. Diffuse   abdominal collaterals..  RETROPERITONEUM/LYMPH NODES: No lymphadenopathy.  ABDOMINAL WALL: Left inguinal hernia containing fluid and fat.  BONES: Degenerative changes.    IMPRESSION: Total situs inversus.  Cirrhosis with a dominant liver mass concerning for primary HCC versus   metastasis.  Peritoneal carcinomatosis and moderate volume ascites.    Assessment and Plan: Patient is a 67yo M with a PMH of liver cirrhosis s/p esophageal varices banding, situs inversus, HF?, bradycardia, HTN, HLD, T2DM, seizure, GERD who was admitted for for liver cirrhosis with ascites concerning for HCC, complicated with sepsis possible secondary to SBP and electrolyte imbalance/hyponatremia. 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 Patient no longer has LUQ pain. CT ab/pelvis showed cirrhosis with a dominant liver mass concerning for primary HCC versus metastasis, and peritoneal carcinomatosis and moderate volume ascites. IR paracentesis showed 4750 cc of tram-colored fluid was aspirated. Patient's abdomen was still slightly distended and firm, but improved from yesterday. Patient is afebrile. Denies chills, headache, lightheadedness, palpitations, dyspnea, nausea, vomiting, diarrhea/constipation.     - Continue IV Ceftriaxone 2g q24 to cover for SBP   - Continue Rifaximin 550mg BID to cover for SBP  - Ascites fluid culture showed PMS leuks present but no organisms  - Lactate 1.7  - UA significant for total protein 24, glucose 250 and small amount of bilirubin   - Hep C negative    - Trend WBC --> elevated   - Trend fevers --> currently afebrile   - F/u blood cultures       Krystle Allred MD  Division of Infectious Diseases   Please call ID service at [phone] with any question.      35 minutes spent on total encounter assessing patient, examination, chart review, counseling and coordinating care by the attending physician/nurse/care manager.   [facility]   INFECTIOUS DISEASES   [address] [place] [address]  Tel: [phone]     Fax: [phone]  =========================================================  MD Marco Weber Kaushal, MD Cho, Michelle, MD Sunjit, Jaspal, MD  =========================================================    HERMANN XAVIER [id number]    Follow up: SBP. No overnight events occurred Patient went for IR paracentesis yesterday where 4750cc tram colored fluid was removed. Today patient is feeling better. His stomach is less distended and feels more soft.     Allergies:  No Known Allergies      Antibiotics:  albuterol/ipratropium for Nebulization 3 milliLiter(s) Nebulizer every 6 hours PRN  carvedilol 3.125 milliGRAM(s) Oral every 12 hours  cefTRIAXone   IVPB 2000 milliGRAM(s) IV Intermittent every 24 hours  dextrose 5%. 1000 milliLiter(s) IV Continuous <Continuous>  dextrose 5%. 1000 milliLiter(s) IV Continuous <Continuous>  dextrose 50% Injectable 25 Gram(s) IV Push once  dextrose 50% Injectable 12.5 Gram(s) IV Push once  dextrose 50% Injectable 25 Gram(s) IV Push once  glucagon  Injectable 1 milliGRAM(s) IntraMuscular once  heparin   Injectable 5000 Unit(s) SubCutaneous every 12 hours  insulin glargine Injectable (LANTUS) 10 Unit(s) SubCutaneous every morning  insulin lispro (ADMELOG) corrective regimen sliding scale   SubCutaneous three times a day before meals  insulin lispro (ADMELOG) corrective regimen sliding scale   SubCutaneous at bedtime  insulin lispro Injectable (ADMELOG) 6 Unit(s) SubCutaneous before lunch  insulin lispro Injectable (ADMELOG) 6 Unit(s) SubCutaneous before breakfast  insulin lispro Injectable (ADMELOG) 6 Unit(s) SubCutaneous before dinner  melatonin 3 milliGRAM(s) Oral at bedtime PRN  phenytoin   Capsule 100 milliGRAM(s) Oral daily  rifAXIMin 550 milliGRAM(s) Oral two times a day  spironolactone 25 milliGRAM(s) Oral daily       REVIEW OF SYSTEMS:  CONSTITUTIONAL:  No Fever or chills  HEENT:   No diplopia or blurred vision.  No earache, sore throat or runny nose.  CARDIOVASCULAR:  No chest pain or SOB  RESPIRATORY:  No cough, shortness of breath, PND or orthopnea.  GASTROINTESTINAL:  No nausea, vomiting or diarrhea.  GENITOURINARY:  No dysuria, frequency or urgency. No Blood in urine  MUSCULOSKELETAL:  no joint aches, no muscle pain  SKIN:  No change in skin, hair or nails.  NEUROLOGIC:  No paresthesias or weakness.  PSYCHIATRIC:  No disorder of thought or mood.  ENDOCRINE:  No heat or cold intolerance, polyuria or polydipsia.  HEMATOLOGICAL:  No easy bruising or bleeding.      Physical Exam:  ICU Vital Signs Last 24 Hrs  T(C): 36.9 (24 Aug 2023 05:34), Max: 36.9 (23 Aug 2023 14:31)  T(F): 98.5 (24 Aug 2023 05:34), Max: 98.5 (24 Aug 2023 05:34)  HR: 118 (24 Aug 2023 05:34) (102 - 123)  BP: 114/80 (24 Aug 2023 05:34) (105/60 - 143/70)  BP(mean): --  ABP: --  ABP(mean): --  RR: 18 (24 Aug 2023 05:34) (18 - 26)  SpO2: 93% (24 Aug 2023 05:34) (93% - 99%)    O2 Parameters below as of 24 Aug 2023 05:34  Patient On (Oxygen Delivery Method): nasal cannula  O2 Flow (L/min): 2         GEN: NAD  HEENT: normocephalic and atraumatic. EOMI. MANJINDER.    NECK: Supple.  No lymphadenopathy   LUNGS: Clear to auscultation.  HEART: Regular rate and rhythm without murmur.  ABDOMEN: Distended and firm, but improved from yesterday s/p IR paracentesis    : No CVA tenderness  EXTREMITIES: Without edema.  MSK: no joint swelling  NEUROLOGIC: grossly intact.  PSYCHIATRIC: Appropriate affect .  SKIN: No rash     Labs:  08-24    121<L>  |  93<L>  |  39<H>  ----------------------------<  362<H>  4.6   |  18<L>  |  1.80<H>    Ca    8.4<L>      24 Aug 2023 06:00  Phos  4.2     08-24  Mg     2.4     08-24    TPro  7.3  /  Alb  2.0<L>  /  TBili  0.6  /  DBili  x   /  AST  52<H>  /  ALT  35  /  AlkPhos  151<H>  08-24                          14.1   14.87 )-----------( 520      ( 24 Aug 2023 06:00 )             41.8     PT/INR - ( 23 Aug 2023 06:18 )   PT: 12.9 sec;   INR: 1.11 ratio           Urinalysis Basic - ( 24 Aug 2023 06:00 )    Color: x / Appearance: x / SG: x / pH: x  Gluc: 362 mg/dL / Ketone: x  / Bili: x / Urobili: x   Blood: x / Protein: x / Nitrite: x   Leuk Esterase: x / RBC: x / WBC x   Sq Epi: x / Non Sq Epi: x / Bacteria: x      LIVER FUNCTIONS - ( 24 Aug 2023 06:00 )  Alb: 2.0 g/dL / Pro: 7.3 g/dL / ALK PHOS: 151 U/L / ALT: 35 U/L / AST: 52 U/L / GGT: x             RECENT CULTURES:  08-23 @ 12:35 Ascites Fl Ascites Fluid       polymorphonuclear leukocytes seen  No organisms seen  by cytocentrifuge      08-23 @ 09:50 .Body Fluid Other, Peritoneal Fluid       polymorphonuclear leukocytes seen  No organisms seen  by cytocentrifuge            All imaging and data are reviewed.   IR Paracentesis 08/23/23  INTERPRETATION:  History: Ascites.    Procedure: The procedure, risks, benefits, and alternatives were   discussed with the patient in the presence of an Interventional Radiology   nurse and informed consent was placed in the chart.    The patient was placed in the supine position on the angio suite bed and   a time out was performed.    The patient's right lower quadrant was prepped and draped in a normal   sterile fashion. Under ultrasound guidance, a 8.5 [place] multiside hole   catheter was advanced into the ascites.  4750 cc of tram-colored fluid was aspirated.    The catheter was removed and Dermabond was applied.    The patient  tolerated the procedure without complication and left the   suite in stable condition.    Findings:    Ultrasound demonstrates ascites in the right lower quadrant.    Impression:  SUCCESSFUL MODERATE VOLUME PARACENTESIS AS DESCRIBED.      CT Ab/ Pelvis 08/21/23  FINDINGS:  LOWER CHEST: Total situs inversus.    LIVER: Cirrhosis. A segment 8/4 A exophytic peripherally enhancing   low-density mass extends superiorly to the diaphragm, measuring   approximately 5.2 x 4.1 cm. Additional 5 cm cyst low-density lesions that   are too small to characterize.  BILE DUCTS: Normal caliber.  GALLBLADDER: Within normal limits.  SPLEEN: Within normal limits.  PANCREAS: Within normal limits.  ADRENALS: Within normal limits.  KIDNEYS/URETERS: Within normal limits.    BLADDER: Within normal limits.  REPRODUCTIVE ORGANS: Mildly prominent prostate gland.    BOWEL: No bowel obstruction.  PERITONEUM: Moderate volume ascites with soft tissue implants diffusely   along the peritoneum as well as the serosal surface of the sigmoid colon.   A perisigmoid implant measures approximately 3.9 x 2.8 cm in greatest   transaxial dimension. Soft tissue implants are noted along the falciform   ligament. Omental nodularity is present.  VESSELS: Patent portal, superior mesenteric, and splenic veins. Diffuse   abdominal collaterals..  RETROPERITONEUM/LYMPH NODES: No lymphadenopathy.  ABDOMINAL WALL: Left inguinal hernia containing fluid and fat.  BONES: Degenerative changes.    IMPRESSION: Total situs inversus.  Cirrhosis with a dominant liver mass concerning for primary HCC versus   metastasis.  Peritoneal carcinomatosis and moderate volume ascites.    Assessment and Plan: Patient is a 67yo M with a PMH of liver cirrhosis s/p esophageal varices banding, situs inversus, HF?, bradycardia, HTN, HLD, T2DM, seizure, GERD who was admitted for for liver cirrhosis with ascites concerning for HCC, complicated with sepsis possible secondary to SBP and electrolyte imbalance/hyponatremia. 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 Patient no longer has LUQ pain. CT ab/pelvis showed cirrhosis with a dominant liver mass concerning for primary HCC versus metastasis, and peritoneal carcinomatosis and moderate volume ascites. IR paracentesis showed 4750 cc of tram-colored fluid was aspirated. Patient's abdomen was still slightly distended and firm, but improved from yesterday. Patient is afebrile. Denies chills, headache, lightheadedness, palpitations, dyspnea, nausea, vomiting, diarrhea/constipation.     - Ascites fluid most likely Transudative   - Continue IV Ceftriaxone 2g q24 until tomorrow and then stop due to transudative nature of the fluid   - Continue Rifaximin 550mg BID   - Ascites fluid culture showed PMS leuks present but no organisms  - Lactate 1.7  - UA significant for total protein 24, glucose 250 and small amount of bilirubin   - Hep C negative    - Trend WBC --> elevated   - Trend fevers --> currently afebrile   - F/u blood cultures       Krystle Allred MD  Division of Infectious Diseases   Please call ID service at [phone] with any question.      35 minutes spent on total encounter assessing patient, examination, chart review, counseling and coordinating care by the attending physician/nurse/care manager.

## 2023-08-24 NOTE — PROGRESS NOTE ADULT - PROBLEM SELECTOR PLAN 5
MAIA in the setting of dehydration  - Baseline creatinine unknown   - Cr 1.7 yesterday --> 1.8 today  - S/p 2L NS bolus in ED  - Hold further fluids for now  - Monitor BMP  - Avoid nephrotoxic medications: hold home torsemide and rampiril  - Monitor renal indices  - Nephro Dr. Harper following MAIA likely in setting of dehydration (baseline Cr unknown)  - S/p 2L NS bolus in ED  - S/p lasix 20mg IV x2 for ascites (8/23) during hospital course  - Cr 1.7 --> 1.8 today  - Continue spironolactone as per above  - Hold further fluids for now  - Monitor BMP  - Avoid nephrotoxic medications: hold home torsemide and rampiril  - Monitor renal indices  - Nephro (Dr. Harper) following MAIA likely in setting of dehydration (baseline Cr unknown)  - S/p 2L NS bolus in ED  - S/p lasix 20mg IV x2 for ascites (8/23) during hospital course  - Cr 1.7 --> 1.8 today prerenal azotemia sec to post iv Lasix   - Continue spironolactone as per above  - Hold further fluids for now  - Monitor BMP  - Avoid nephrotoxic medications: hold home torsemide and Ramipril  - Monitor renal indices  - Nephro (Dr. Harper)

## 2023-08-24 NOTE — PROGRESS NOTE ADULT - ATTENDING COMMENTS
No new changes, no fever, going for IR Paracentesis, based on peritoneal fluid analysis will decide about the treatment, at this time will cover possible SBP. Mass in liver could be metastatic or nonneoplastic, needs biopsy to confirm. AFP is normal.  Will continue ceftriaxone for now.
Ascitic fluid analysis more consistent with transudate but CT showed possible peritoneal carcinomatosis that usually caused exudative ascites. At this time will treat him with ceftriaxone for possible SBP since ceftriaxone was started before fluid analysis and culture. Will complete 5days for possible SBP.
67 yo Mele-speaking male PMH of liver cirrhosis s/p esophageal varices banding, situs inversus, HF?, bradycardia, HTN, HLD, T2DM, seizure disorder, GERD presents to ED for 4-5 days of subjective fever, weakness, malaise, decreased appetite, abd bloating and LUQ pain. Admitted for liver cirrhosis with ascites, concerning for HCC, complicated with sepsis possible secondary to SBP and electrolyte imbalance/hyponatremia.    Patient seen and examined at bedside. No overnight events. IR for paracentesis tomorrow, discussed with GI and IR. Continue IV Rocephin. F/u blood cultures. F/u IR peritoneal fluid culture and PMN count. Heme/onc recs appreciated. PT/OT. Treatment will be dependent on clinical course.

## 2023-08-24 NOTE — PROGRESS NOTE ADULT - ASSESSMENT
Hyponatremia: Hypovolemic  MAIA, CKD 3: prerenal azotemia  Cirrhosis, Liver mass, Peritoneal carcinomatosis, Ascites  Hypertension    08/22/23: Improving sodium levels and renal indices. Gentle IV hydration. Monitor blood sugar levels. Insulin coverage as needed.   Malignancy work up. D/w patients family at bedside. Will follow electrolytes and renal function trend.   08/23/23: Sodium trending down. For IR paracentesis. Received IV lasix for SOB. Maintain PO fluid restriction. Overall prognosis poor. Low potassium diet. PRN lokelma if potassium trending up. D/w pt's son at bedside   08/24/23: Low sodium. PO fluid restriction. Salt tabs. Avoid hypotonic fluids. D/w patients family at bedside.

## 2023-08-24 NOTE — PROGRESS NOTE ADULT - PROBLEM SELECTOR PLAN 7
Chronic, non- insulin dependent   - In ED: Glu 450 on admission, downtrended to 269 s/p Humulin 5U  - Hgb A1C 9.4%  - Hold home oral meds  - Moderate dose insulin corrective scale  - Increased Lispro to 6U pre-meals and Lantus 10U AM as blood sugars not controlled at this time  - Endo Dr. Perlman consulted, f/u recs   - Hypoglycemia protocol, fingerstick glucose QAC&HS Chronic, non- insulin dependent   - In ED: Glu 450 on admission, downtrended to 269 s/p Humulin 5U  - Hgb A1C 9.4%  - Hold home oral meds  - continue Moderate dose insulin corrective scale  - Increased Lispro to 6U pre-meals and Lantus 10U AM as blood sugars not controlled at this time  - [facility] (Dr. Perlman) consulted, f/u recs   - Hypoglycemia protocol, fingerstick glucose QAC&HS Chronic   uncontrolled   - In ED: Glu 450 on admission, downtrended to 269 s/p Humulin 5U  - Hgb A1C 9.4%  - Hold home oral meds  - continue Moderate dose insulin corrective scale  - Increased Lispro to 6U pre-meals and Lantus 10U AM as blood sugars not controlled at this time  - Endo (Dr. Perlman) consulted,   - Hypoglycemia protocol, fingerstick glucose QAC&HS

## 2023-08-24 NOTE — CONSULT NOTE ADULT - PROVIDER SPECIALTY LIST ADULT
Infectious Disease
Endocrinology
Nephrology
Cardiology
Gastroenterology
Heme/Onc
Pulmonology
Diabetes

## 2023-08-24 NOTE — CHART NOTE - NSCHARTNOTEFT_GEN_A_CORE
RN called for pt found to be tachy to 170. Tele called, pt has been sinus tachy to 150s, highest 170 for about 10 minutes. Per RN pt was asymptomatic and resting comfortably in bed with no palpitations, dizziness, or SOB. Pt was sinus tachy to 112 at the time of the call. Pt has been tachycardic since admission.    No intervention at this time. Further management per primary team. RN called for pt found to be tachy to 170. Tele called, pt has been sinus tachy to 150s, highest 170 for about 10 minutes. Per RN pt was asymptomatic and resting comfortably in bed with no palpitations, dizziness, or SOB. Pt was sinus tachy to 112 at the time of the call. Of note, pt missed PM carvedilol dose due to /75. Pt has been tachycardic since admission.    No intervention at this time. Further management per primary team.

## 2023-08-24 NOTE — PROGRESS NOTE ADULT - SUBJECTIVE AND OBJECTIVE BOX
Patient is a 68y old  Male who presents with a chief complaint of Liver Cirrhosis with ascites, Liver cancer, hyponatremia (24 Aug 2023 12:14)      Subjective:  INTERVAL HPI/OVERNIGHT EVENTS: Patient seen and examined at bedside. Patient is Mele speaking,  ID [id number]. Patient had one episode of VTach at around 8:25AM which resolved without intervention. He remained sinus tachy before and after the event. Patient's HR is currently in the 110s. Patient endorses L lower chest/rib pain that has remained unchanged since yesterday. He also reports that the LUQ abdominal pain is better after paracentesis yesterday with resolution of SOB. Patient has not had a BM yet. Patient reports lack of appetite and weakness. Denies fevers, chills, headache, lightheadedness, palpitations, dyspnea, nausea, vomiting, diarrhea/constipation.      MEDICATIONS  (STANDING):  carvedilol 3.125 milliGRAM(s) Oral every 12 hours  cefTRIAXone   IVPB 2000 milliGRAM(s) IV Intermittent every 24 hours  dextrose 5%. 1000 milliLiter(s) (50 mL/Hr) IV Continuous <Continuous>  dextrose 5%. 1000 milliLiter(s) (100 mL/Hr) IV Continuous <Continuous>  dextrose 50% Injectable 25 Gram(s) IV Push once  dextrose 50% Injectable 12.5 Gram(s) IV Push once  dextrose 50% Injectable 25 Gram(s) IV Push once  glucagon  Injectable 1 milliGRAM(s) IntraMuscular once  heparin   Injectable 5000 Unit(s) SubCutaneous every 12 hours  insulin glargine Injectable (LANTUS) 10 Unit(s) SubCutaneous every morning  insulin lispro (ADMELOG) corrective regimen sliding scale   SubCutaneous at bedtime  insulin lispro (ADMELOG) corrective regimen sliding scale   SubCutaneous three times a day before meals  insulin lispro Injectable (ADMELOG) 6 Unit(s) SubCutaneous before breakfast  insulin lispro Injectable (ADMELOG) 6 Unit(s) SubCutaneous before lunch  insulin lispro Injectable (ADMELOG) 6 Unit(s) SubCutaneous before dinner  phenytoin   Capsule 100 milliGRAM(s) Oral daily  rifAXIMin 550 milliGRAM(s) Oral two times a day  sodium chloride 2 Gram(s) Oral every 6 hours  spironolactone 25 milliGRAM(s) Oral daily    MEDICATIONS  (PRN):  albuterol/ipratropium for Nebulization 3 milliLiter(s) Nebulizer every 6 hours PRN Shortness of Breath and/or Wheezing  melatonin 3 milliGRAM(s) Oral at bedtime PRN Insomnia      Allergies    No Known Allergies    Intolerances        REVIEW OF SYSTEMS:  CONSTITUTIONAL: (+) Lack of appetite. No fever or chills  HEENT:  No headache, no sore throat  RESPIRATORY: No cough, wheezing, or shortness of breath (resolved)   CARDIOVASCULAR: (+) L sided lower chest/rib pain. No palpitations  GASTROINTESTINAL: (+) LUQ abdominal pain (improving after paracentesis). No nausea, vomiting, or diarrhea  GENITOURINARY: No dysuria, frequency, or hematuria  NEUROLOGICAL: (+) Generalized weakness. No dizziness  MUSCULOSKELETAL: No myalgias     Objective:  Vital Signs Last 24 Hrs  T(C): 37.4 (24 Aug 2023 11:50), Max: 37.4 (24 Aug 2023 11:50)  T(F): 99.4 (24 Aug 2023 11:50), Max: 99.4 (24 Aug 2023 11:50)  HR: 112 (24 Aug 2023 11:50) (102 - 123)  BP: 103/72 (24 Aug 2023 11:50) (103/72 - 118/76)  BP(mean): --  RR: 18 (24 Aug 2023 11:50) (18 - 26)  SpO2: 94% (24 Aug 2023 11:50) (93% - 99%)    Parameters below as of 24 Aug 2023 11:50  Patient On (Oxygen Delivery Method): nasal cannula  O2 Flow (L/min): 2      GENERAL: NAD, lying in bed comfortably  HEAD:  Atraumatic, Normocephalic  EYES: EOMI, PERRLA, conjunctiva and sclera clear  ENT: Dry oral mucous   NECK: Supple, No JVD  CHEST/LUNG: (+) Tenderness to palpation in L lower chest/rib region. Clear to auscultation bilaterally; No rales, rhonchi, or wheezing.  HEART: S1,S2. Regular rhythm. (+) Tachy. No murmurs  ABDOMEN: BSx4. Soft, nondistended (improved after paracentesis). (+) Tenderness to palpation in LUQ region. No rebound or guarding  EXTREMITIES: No clubbing, cyanosis, or edema  NERVOUS SYSTEM:  Alert & Oriented X3, speech clear. No acute deficit. Non focal       LABS:                        14.1   14.87 )-----------( 520      ( 24 Aug 2023 06:00 )             41.8     CBC Full  -  ( 24 Aug 2023 06:00 )  WBC Count : 14.87 K/uL  Hemoglobin : 14.1 g/dL  Hematocrit : 41.8 %  Platelet Count - Automated : 520 K/uL  Mean Cell Volume : 79.6 fl  Mean Cell Hemoglobin : 26.9 pg  Mean Cell Hemoglobin Concentration : 33.7 gm/dL  Auto Neutrophil # : 10.69 K/uL  Auto Lymphocyte # : 1.48 K/uL  Auto Monocyte # : 2.08 K/uL  Auto Eosinophil # : 0.35 K/uL  Auto Basophil # : 0.13 K/uL  Auto Neutrophil % : 71.8 %  Auto Lymphocyte % : 10.0 %  Auto Monocyte % : 14.0 %  Auto Eosinophil % : 2.4 %  Auto Basophil % : 0.9 %    24 Aug 2023 06:00    121    |  93     |  39     ----------------------------<  362    4.6     |  18     |  1.80     Ca    8.4        24 Aug 2023 06:00  Phos  4.2       24 Aug 2023 06:00  Mg     2.4       24 Aug 2023 06:00    TPro  7.3    /  Alb  2.0    /  TBili  0.6    /  DBili  x      /  AST  52     /  ALT  35     /  AlkPhos  151    24 Aug 2023 06:00    PT/INR - ( 23 Aug 2023 06:18 )   PT: 12.9 sec;   INR: 1.11 ratio           Urinalysis Basic - ( 24 Aug 2023 06:00 )    Color: x / Appearance: x / SG: x / pH: x  Gluc: 362 mg/dL / Ketone: x  / Bili: x / Urobili: x   Blood: x / Protein: x / Nitrite: x   Leuk Esterase: x / RBC: x / WBC x   Sq Epi: x / Non Sq Epi: x / Bacteria: x      CAPILLARY BLOOD GLUCOSE      POCT Blood Glucose.: 163 mg/dL (24 Aug 2023 11:36)  POCT Blood Glucose.: 320 mg/dL (24 Aug 2023 07:34)  POCT Blood Glucose.: 227 mg/dL (23 Aug 2023 21:25)  POCT Blood Glucose.: 227 mg/dL (23 Aug 2023 17:09)        Culture - Body Fluid with Gram Stain (collected 08-23-23 @ 12:35)  Source: Ascites Fl Ascites Fluid  Gram Stain (08-23-23 @ 23:12):    polymorphonuclear leukocytes seen    No organisms seen    by cytocentrifuge    Culture - Body Fluid with Gram Stain (collected 08-23-23 @ 09:50)  Source: .Body Fluid Other, Peritoneal Fluid  Gram Stain (08-24-23 @ 02:43):    polymorphonuclear leukocytes seen    No organisms seen    by cytocentrifuge        RADIOLOGY & ADDITIONAL TESTS:    Personally reviewed.     Consultant(s) Notes Reviewed:  [x] YES  [ ] NO     Patient is a 68y old  Male who presents with a chief complaint of Liver Cirrhosis with ascites, Liver cancer, hyponatremia (24 Aug 2023 12:14)      Subjective:  INTERVAL HPI/OVERNIGHT EVENTS: Patient seen and examined at bedside. Patient is Mele speaking,  ID [id number]. Patient had one episode of VTach at around 8:25AM which resolved without intervention. He remained sinus tachy before and after the event. Patient's HR is currently in the 110s. Patient endorses L lower chest/rib pain that has remained unchanged since yesterday. He also reports that the LUQ abdominal pain is better after paracentesis yesterday with resolution of SOB. Patient has not had a BM yet. Patient reports lack of appetite and weakness. Denies fevers, chills, headache, lightheadedness, palpitations, dyspnea, nausea, vomiting, diarrhea/constipation.      MEDICATIONS  (STANDING):  carvedilol 3.125 milliGRAM(s) Oral every 12 hours  cefTRIAXone   IVPB 2000 milliGRAM(s) IV Intermittent every 24 hours  dextrose 5%. 1000 milliLiter(s) (50 mL/Hr) IV Continuous <Continuous>  dextrose 5%. 1000 milliLiter(s) (100 mL/Hr) IV Continuous <Continuous>  dextrose 50% Injectable 25 Gram(s) IV Push once  dextrose 50% Injectable 12.5 Gram(s) IV Push once  dextrose 50% Injectable 25 Gram(s) IV Push once  glucagon  Injectable 1 milliGRAM(s) IntraMuscular once  heparin   Injectable 5000 Unit(s) SubCutaneous every 12 hours  insulin glargine Injectable (LANTUS) 10 Unit(s) SubCutaneous every morning  insulin lispro (ADMELOG) corrective regimen sliding scale   SubCutaneous at bedtime  insulin lispro (ADMELOG) corrective regimen sliding scale   SubCutaneous three times a day before meals  insulin lispro Injectable (ADMELOG) 6 Unit(s) SubCutaneous before breakfast  insulin lispro Injectable (ADMELOG) 6 Unit(s) SubCutaneous before lunch  insulin lispro Injectable (ADMELOG) 6 Unit(s) SubCutaneous before dinner  phenytoin   Capsule 100 milliGRAM(s) Oral daily  rifAXIMin 550 milliGRAM(s) Oral two times a day  sodium chloride 2 Gram(s) Oral every 6 hours  spironolactone 25 milliGRAM(s) Oral daily    MEDICATIONS  (PRN):  albuterol/ipratropium for Nebulization 3 milliLiter(s) Nebulizer every 6 hours PRN Shortness of Breath and/or Wheezing  melatonin 3 milliGRAM(s) Oral at bedtime PRN Insomnia      Allergies    No Known Allergies    Intolerances        REVIEW OF SYSTEMS:  CONSTITUTIONAL: (+) Lack of appetite. No fever or chills  HEENT:  No headache, no sore throat  RESPIRATORY: No cough, wheezing, or shortness of breath (resolved)   CARDIOVASCULAR: (+) L sided lower chest/rib pain. No palpitations  GASTROINTESTINAL: (+) LUQ abdominal pain (improving after paracentesis). No nausea, vomiting, or diarrhea  GENITOURINARY: No dysuria, frequency, or hematuria  NEUROLOGICAL: (+) Generalized weakness. No dizziness  MUSCULOSKELETAL: No myalgias     Objective:  Vital Signs Last 24 Hrs  T(C): 37.4 (24 Aug 2023 11:50), Max: 37.4 (24 Aug 2023 11:50)  T(F): 99.4 (24 Aug 2023 11:50), Max: 99.4 (24 Aug 2023 11:50)  HR: 112 (24 Aug 2023 11:50) (102 - 123)  BP: 103/72 (24 Aug 2023 11:50) (103/72 - 118/76)  BP(mean): --  RR: 18 (24 Aug 2023 11:50) (18 - 26)  SpO2: 94% (24 Aug 2023 11:50) (93% - 99%)    Parameters below as of 24 Aug 2023 11:50  Patient On (Oxygen Delivery Method): nasal cannula  O2 Flow (L/min): 2      GENERAL: NC in place, NAD, lying in bed comfortably  HEAD:  Atraumatic, Normocephalic  EYES: EOMI, PERRLA, conjunctiva and sclera clear  ENT: Dry oral mucous   NECK: Supple, No JVD  CHEST/LUNG: (+) Tenderness to palpation in L lower chest/rib region. Clear to auscultation bilaterally; No rales, rhonchi, or wheezing.  HEART: S1,S2. Regular rhythm. (+) Tachy. No murmurs  ABDOMEN: BSx4. Soft, distended (improved after paracentesis). (+) Tenderness to palpation in LUQ region. No rebound or guarding  EXTREMITIES: No clubbing, cyanosis, or edema  NERVOUS SYSTEM:  Alert & Oriented X3, speech clear. No acute deficit. Non focal       LABS:                        14.1   14.87 )-----------( 520      ( 24 Aug 2023 06:00 )             41.8     CBC Full  -  ( 24 Aug 2023 06:00 )  WBC Count : 14.87 K/uL  Hemoglobin : 14.1 g/dL  Hematocrit : 41.8 %  Platelet Count - Automated : 520 K/uL  Mean Cell Volume : 79.6 fl  Mean Cell Hemoglobin : 26.9 pg  Mean Cell Hemoglobin Concentration : 33.7 gm/dL  Auto Neutrophil # : 10.69 K/uL  Auto Lymphocyte # : 1.48 K/uL  Auto Monocyte # : 2.08 K/uL  Auto Eosinophil # : 0.35 K/uL  Auto Basophil # : 0.13 K/uL  Auto Neutrophil % : 71.8 %  Auto Lymphocyte % : 10.0 %  Auto Monocyte % : 14.0 %  Auto Eosinophil % : 2.4 %  Auto Basophil % : 0.9 %    24 Aug 2023 06:00    121    |  93     |  39     ----------------------------<  362    4.6     |  18     |  1.80     Ca    8.4        24 Aug 2023 06:00  Phos  4.2       24 Aug 2023 06:00  Mg     2.4       24 Aug 2023 06:00    TPro  7.3    /  Alb  2.0    /  TBili  0.6    /  DBili  x      /  AST  52     /  ALT  35     /  AlkPhos  151    24 Aug 2023 06:00    PT/INR - ( 23 Aug 2023 06:18 )   PT: 12.9 sec;   INR: 1.11 ratio           Urinalysis Basic - ( 24 Aug 2023 06:00 )    Color: x / Appearance: x / SG: x / pH: x  Gluc: 362 mg/dL / Ketone: x  / Bili: x / Urobili: x   Blood: x / Protein: x / Nitrite: x   Leuk Esterase: x / RBC: x / WBC x   Sq Epi: x / Non Sq Epi: x / Bacteria: x      CAPILLARY BLOOD GLUCOSE      POCT Blood Glucose.: 163 mg/dL (24 Aug 2023 11:36)  POCT Blood Glucose.: 320 mg/dL (24 Aug 2023 07:34)  POCT Blood Glucose.: 227 mg/dL (23 Aug 2023 21:25)  POCT Blood Glucose.: 227 mg/dL (23 Aug 2023 17:09)        Culture - Body Fluid with Gram Stain (collected 08-23-23 @ 12:35)  Source: Ascites Fl Ascites Fluid  Gram Stain (08-23-23 @ 23:12):    polymorphonuclear leukocytes seen    No organisms seen    by cytocentrifuge    Culture - Body Fluid with Gram Stain (collected 08-23-23 @ 09:50)  Source: .Body Fluid Other, Peritoneal Fluid  Gram Stain (08-24-23 @ 02:43):    polymorphonuclear leukocytes seen    No organisms seen    by cytocentrifuge        RADIOLOGY & ADDITIONAL TESTS:  < from: US Paracentesis (08.23.23 @ 13:12) >  ACC: [id number] EXAM:  US PARACENTESIS SISC   ORDERED BY: STEPHAN FRANCISCO     PROCEDURE DATE:  08/23/2023          INTERPRETATION:  History: Ascites.    : SEBLE Evangelista.    Attending: None.    Anesthesia: Lidocaine 1% 8cc    Complication: None.    Procedure: The procedure, risks, benefits, and alternatives were   discussed with the patient in the presence of an Interventional Radiology   nurse and informed consent was placed in the chart.    The patient was placed in the supine position on the angio suite bed and   a time out was performed.    The patient's right lower quadrant was prepped and draped in a normal   sterile fashion. Under ultrasound guidance, a 8.5 Chinese multiside hole   catheter was advanced into the ascites.  4750 cc of tram-colored fluid was aspirated.    The catheter was removed and Dermabond was applied.    The patient  tolerated the procedure without complication and left the   suite in stable condition.    Findings:    Ultrasound demonstrates ascites in the right lower quadrant.    Impression:    SUCCESSFUL MODERATE VOLUME PARACENTESIS AS DESCRIBED.    --- End of Report ---            II MARK PRUETT, INTERVENTIONAL RADIOLOGY  This document has been electronically signed. Aug 23 2023  3:40PM    < end of copied text >    Personally reviewed.     Consultant(s) Notes Reviewed:  [x] YES  [ ] NO     Patient is a 68y old  Male who presents with a chief complaint of Liver Cirrhosis with ascites, Liver cancer, hyponatremia (24 Aug 2023 12:14)      Subjective:  INTERVAL HPI/OVERNIGHT EVENTS: Patient seen and examined at bedside. Patient is Mele speaking,  ID [id number]. Patient had one episode of VTach at around 8:25AM which resolved without intervention. He remained sinus tachy before and after the event. Patient's HR is currently in the 110s. Patient endorses L lower chest/rib pain that has remained unchanged since yesterday. He also reports that the LUQ abdominal pain is better after paracentesis yesterday with resolution of SOB.   Patient has not had a BM yet. Patient reports lack of appetite and weakness. Denies fevers, chills, headache, lightheadedness, palpitations, dyspnea, nausea, vomiting, diarrhea/constipation.            REVIEW OF SYSTEMS:  CONSTITUTIONAL: (+) Lack of appetite. No fever or chills  HEENT:  No headache, no sore throat  RESPIRATORY: No cough, wheezing, or shortness of breath (resolved)   CARDIOVASCULAR: (+) L sided lower chest/rib pain. No palpitations  GASTROINTESTINAL: (+) LUQ abdominal pain (improving after paracentesis). No nausea, vomiting, or diarrhea  GENITOURINARY: No dysuria, frequency, or hematuria  NEUROLOGICAL: (+) Generalized weakness. No dizziness  MUSCULOSKELETAL: No myalgias     Objective:  Vital Signs Last 24 Hrs  T(C): 37.4 (24 Aug 2023 11:50), Max: 37.4 (24 Aug 2023 11:50)  T(F): 99.4 (24 Aug 2023 11:50), Max: 99.4 (24 Aug 2023 11:50)  HR: 112 (24 Aug 2023 11:50) (102 - 123)  BP: 103/72 (24 Aug 2023 11:50) (103/72 - 118/76)  BP(mean): --  RR: 18 (24 Aug 2023 11:50) (18 - 26)  SpO2: 94% (24 Aug 2023 11:50) (93% - 99%)    Parameters below as of 24 Aug 2023 11:50  Patient On (Oxygen Delivery Method): nasal cannula  O2 Flow (L/min): 2      GENERAL: NC in place, NAD, lying in bed comfortably  HEAD:  Atraumatic, Normocephalic  EYES: EOMI, PERRLA, conjunctiva and sclera clear  ENT: Dry oral mucous   NECK: Supple, No JVD  CHEST/LUNG: (+) Tenderness to palpation in L lower chest/rib region.   auscultation bilaterally; No rales, rhonchi, or wheezing.  HEART: S1,S2. Regular rhythm. (+) Tachy. No murmurs  ABDOMEN: BSx4. Soft, distended (improved after paracentesis). (+) Tenderness to palpation in LUQ region. No rebound or guarding  EXTREMITIES: No clubbing, cyanosis, or edema  NERVOUS SYSTEM:  Alert & Oriented X3, speech clear , sensory /motor intact   gu intact        MEDICATIONS  (STANDING):  carvedilol 3.125 milliGRAM(s) Oral every 12 hours  cefTRIAXone   IVPB 2000 milliGRAM(s) IV Intermittent every 24 hours  dextrose 5%. 1000 milliLiter(s) (50 mL/Hr) IV Continuous <Continuous>  dextrose 5%. 1000 milliLiter(s) (100 mL/Hr) IV Continuous <Continuous>  dextrose 50% Injectable 25 Gram(s) IV Push once  dextrose 50% Injectable 12.5 Gram(s) IV Push once  dextrose 50% Injectable 25 Gram(s) IV Push once  glucagon  Injectable 1 milliGRAM(s) IntraMuscular once  heparin   Injectable 5000 Unit(s) SubCutaneous every 12 hours  insulin glargine Injectable (LANTUS) 10 Unit(s) SubCutaneous every morning  insulin lispro (ADMELOG) corrective regimen sliding scale   SubCutaneous at bedtime  insulin lispro (ADMELOG) corrective regimen sliding scale   SubCutaneous three times a day before meals  insulin lispro Injectable (ADMELOG) 6 Unit(s) SubCutaneous before breakfast  insulin lispro Injectable (ADMELOG) 6 Unit(s) SubCutaneous before lunch  insulin lispro Injectable (ADMELOG) 6 Unit(s) SubCutaneous before dinner  phenytoin   Capsule 100 milliGRAM(s) Oral daily  rifAXIMin 550 milliGRAM(s) Oral two times a day  sodium chloride 2 Gram(s) Oral every 6 hours  spironolactone 25 milliGRAM(s) Oral daily    MEDICATIONS  (PRN):  albuterol/ipratropium for Nebulization 3 milliLiter(s) Nebulizer every 6 hours PRN Shortness of Breath and/or Wheezing  melatonin 3 milliGRAM(s) Oral at bedtime PRN Insomnia      Allergies    No Known Allergies    Intolerances      LABS:                        14.1   14.87 )-----------( 520      ( 24 Aug 2023 06:00 )             41.8     CBC Full  -  ( 24 Aug 2023 06:00 )  WBC Count : 14.87 K/uL  Hemoglobin : 14.1 g/dL  Hematocrit : 41.8 %  Platelet Count - Automated : 520 K/uL  Mean Cell Volume : 79.6 fl  Mean Cell Hemoglobin : 26.9 pg  Mean Cell Hemoglobin Concentration : 33.7 gm/dL  Auto Neutrophil # : 10.69 K/uL  Auto Lymphocyte # : 1.48 K/uL  Auto Monocyte # : 2.08 K/uL  Auto Eosinophil # : 0.35 K/uL  Auto Basophil # : 0.13 K/uL  Auto Neutrophil % : 71.8 %  Auto Lymphocyte % : 10.0 %  Auto Monocyte % : 14.0 %  Auto Eosinophil % : 2.4 %  Auto Basophil % : 0.9 %    24 Aug 2023 06:00    121    |  93     |  39     ----------------------------<  362    4.6     |  18     |  1.80     Ca    8.4        24 Aug 2023 06:00  Phos  4.2       24 Aug 2023 06:00  Mg     2.4       24 Aug 2023 06:00    TPro  7.3    /  Alb  2.0    /  TBili  0.6    /  DBili  x      /  AST  52     /  ALT  35     /  AlkPhos  151    24 Aug 2023 06:00    PT/INR - ( 23 Aug 2023 06:18 )   PT: 12.9 sec;   INR: 1.11 ratio           Urinalysis Basic - ( 24 Aug 2023 06:00 )    Color: x / Appearance: x / SG: x / pH: x  Gluc: 362 mg/dL / Ketone: x  / Bili: x / Urobili: x   Blood: x / Protein: x / Nitrite: x   Leuk Esterase: x / RBC: x / WBC x   Sq Epi: x / Non Sq Epi: x / Bacteria: x      CAPILLARY BLOOD GLUCOSE      POCT Blood Glucose.: 163 mg/dL (24 Aug 2023 11:36)  POCT Blood Glucose.: 320 mg/dL (24 Aug 2023 07:34)  POCT Blood Glucose.: 227 mg/dL (23 Aug 2023 21:25)  POCT Blood Glucose.: 227 mg/dL (23 Aug 2023 17:09)        Culture - Body Fluid with Gram Stain (collected 08-23-23 @ 12:35)  Source: Ascites Fl Ascites Fluid  Gram Stain (08-23-23 @ 23:12):    polymorphonuclear leukocytes seen    No organisms seen    by cytocentrifuge    Culture - Body Fluid with Gram Stain (collected 08-23-23 @ 09:50)  Source: .Body Fluid Other, Peritoneal Fluid  Gram Stain (08-24-23 @ 02:43):    polymorphonuclear leukocytes seen    No organisms seen    by cytocentrifuge        RADIOLOGY & ADDITIONAL TESTS:  < from: US Paracentesis (08.23.23 @ 13:12) >  ACC: [id number] EXAM:  US PARACENTESIS SISC   ORDERED BY: STEPHAN FRANCISCO     PROCEDURE DATE:  08/23/2023          INTERPRETATION:  History: Ascites.    : SEBLE Evangelista.    Attending: None.    Anesthesia: Lidocaine 1% 8cc    Complication: None.    Procedure: The procedure, risks, benefits, and alternatives were   discussed with the patient in the presence of an Interventional Radiology   nurse and informed consent was placed in the chart.    The patient was placed in the supine position on the angio suite bed and   a time out was performed.    The patient's right lower quadrant was prepped and draped in a normal   sterile fashion. Under ultrasound guidance, a 8.5 Macedonian multiside hole   catheter was advanced into the ascites.  4750 cc of tram-colored fluid was aspirated.    The catheter was removed and Dermabond was applied.    The patient  tolerated the procedure without complication and left the   suite in stable condition.    Findings:    Ultrasound demonstrates ascites in the right lower quadrant.    Impression:    SUCCESSFUL MODERATE VOLUME PARACENTESIS AS DESCRIBED.    --- End of Report ---            II MARK PRUETT, INTERVENTIONAL RADIOLOGY  This document has been electronically signed. Aug 23 2023  3:40PM    < end of copied text >    Personally reviewed.     Consultant(s) Notes Reviewed:  [x] YES  [ ] NO

## 2023-08-24 NOTE — PROGRESS NOTE ADULT - PROBLEM SELECTOR PLAN 1
Chronic, s/p esophageal varices banding in [place] [facility]    - Used to drink alcohol years ago   - AST 52 and Alk phos 151; ALT WNL  - CT A/P: Total situs inversus. Cirrhosis with a dominant liver mass concerning for primary HCC versus metastasis. Peritoneal carcinomatosis and moderate volume ascites.  - IR paracentesis on 8/23 - 4750cc of tram ascitic fluid removed from right abdomen  - Ascites fluid analysis: protein 2.1, albumin 0.9, ; PMN leukocytes noted but no organisms  - SAAG of 1.1, likely portal HTN   - F/u IR peritoneal fluid culture  - Hypoalbuminemia at 2.0 - gave albumin IVPB before paracentesis   - Leukocytosis uptrending 14.87 today (12.48 yesterday)  - Continue rifaximin 550mg BID as per GI  - S/p lasix 20mg IV x2 for ascites (8/23) - SOB resolved  - Give lasix x1 as per cardio, pt mildly overloaded   - Continue spironolactone  - Monitor lytes and replete as needed   - Hold hepatotoxic meds  - GI Dr. Devi consulted Chronic, s/p esophageal varices banding in [place] [facility]    - Used to drink alcohol years ago   - AST 52 and Alk phos 151; ALT WNL  - CT A/P: Total situs inversus. Cirrhosis with a dominant liver mass concerning for primary HCC versus metastasis. Peritoneal carcinomatosis and moderate volume ascites.  - IR paracentesis on 8/23 - 4750cc of tram ascitic fluid removed from right abdomen  - Ascites fluid analysis: protein 2.1, albumin 0.9, ; PMN leukocytes noted but no organisms  - SAAG > 1.1, likely portal HTN from cirrhosis  - F/u IR peritoneal fluid culture  - Hypoalbuminemia at 2.0 - gave albumin IVPB before paracentesis   - Leukocytosis uptrending 14.87 today (12.48 yesterday) likely reactive from paracentesis. Afebrile, will monitor  - Continue rifaximin 550mg BID as per GI  - S/p lasix 20mg IV x2 for ascites (8/23) - SOB resolved  - Continue spironolactone 25mg daily  - Monitor lytes and replete as needed   - Hold hepatotoxic meds  - GI (Dr. Devi) consulted, recs appreciated Chronic, s/p esophageal varices banding in his [place] [facility]    - Used to drink alcohol years ago   - AST 52 and Alk phos 151; ALT WNL  - CT A/P: Total situs inversus. Cirrhosis with a dominant liver mass concerning for primary HCC versus metastasis. Peritoneal carcinomatosis and moderate volume ascites.  - IR paracentesis on 8/23 - 4750cc of tram ascitic fluid removed from right abdomen  - Ascites fluid analysis: protein 2.1, albumin 0.9, ; PMN leukocytes noted but no organisms  - SAAG > 1.1, likely portal HTN from cirrhosis  - F/u IR peritoneal fluid culture  so far neg   - Hypoalbuminemia at 2.0 - gave albumin IVPB before paracentesis   - Leukocytosis uptrending 14.87 today (12.48 yesterday) likely reactive from  post paracentesis. Afebrile, will monitor  - Continue rifaximin 550mg BID as per GI  - S/p lasix 20mg IV x2 for ascites (8/23) - SOB resolved  - Continue spironolactone 25mg daily  - Hold hepatotoxic meds  - GI (Dr. Devi) consulted,

## 2023-08-24 NOTE — PROGRESS NOTE ADULT - ASSESSMENT
69 y/o Mele-speaking, refused , Son Ang translated at bedside. Patient has history of liver cirrhosis s/p esophageal varices banding, situs inversus, HF?, bradycardia, HTN, HLD, T2DM, seizure, GERD presents to ED due to subjective fever for the past 4-5 days associated with weakness, malaise, poor appetite and abdominal bloating, symptoms kept persistently worsening and yesterday he started to have LUQ pain,    wheezing  HTN  DM  Cirrhosis  GERD  Liver mass  esoph varices    4750 cc drained - s/p paracentesis  on o2 support  on ABX  vs noted    ct imaging reviewed  GI eval  cirrhosis - liver mass  MAIA - I and O - serial labs - replete lytes  lung bases - clear -   wheezing - episodic - on NEBS prn  monitor VS and HD and Sat  GOC discussion  DM care  serial FS  prognosis guarded  emp ABX in progress - eval for SBP

## 2023-08-24 NOTE — CONSULT NOTE ADULT - REASON FOR ADMISSION
Liver Cirrhosis with ascites, Liver cancer, hyponatremia

## 2023-08-24 NOTE — PROGRESS NOTE ADULT - PROBLEM SELECTOR PLAN 9
Expiratory wheezing on exam (on admission); did not appreciate wheezing today  - Pt states no hx of asthma, not on any inhalers  - Continue darrin PRN   - Pulm Dr. Denson consulted Expiratory wheezing on exam (on admission); did not appreciate wheezing today  - Pt states no hx of asthma, not on any inhalers  - Continue duonebs PRN   - Now on 3L NC  - Pulm (Dr. Denson) consulted Expiratory wheezing on exam (on admission); did not appreciate wheezing today  - Pt states no hx of asthma, not on any inhalers  - Continue duonebs PRN   - Now on 2 L NC pulse ox 92   - Pulm (Dr. Denson) consulted

## 2023-08-24 NOTE — CONSULT NOTE ADULT - SUBJECTIVE AND OBJECTIVE BOX
Patient is a 68y old  Male who presents with a chief complaint of Liver Cirrhosis with ascites, Liver cancer, hyponatremia (24 Aug 2023 13:47)    pt elaine speaking, refusing , requests willy fry @ bedside to translate, pt visiting from [place], all doctors there.  Type 2 DM dx 20 years, denies complications, liver cirrhosis since 2006. managed by family doctor in [place], current A1c 9.4%, Rx home glimepiride 1mg BID and metformin 1000mg BID. does F/S @ home daily AM fasting, reports readings ~110mg/dL, denies hypoglycemia. reviewed A1c measure and BG targets. using insulin inpatient for glycemic control. discussed CC diet and carb identification, portion control, pt has not been eating well recently, discussed prioritizing nonstarchy vegetables and protein. discussed adding basal insulin 1 subq injection daily, will provide insulin pen teaching, educated to increase F/S to BID. will send to insulin to pharmacy, discussed using CGM to trend blood glucose. verbal education and handouts provided.  diabetes education provided- A1c measure and BG targets  fasting, <180 2 hours postmeal. medication MOA and considerations/side effects, inhospital BGM frequency and insulin administration, s/s of hyperglycemia/hypoglycemia and management, glycemic control and preventing complications, consistent carb diet, balanced plate method, consistent meal planning. sick day management, provider f/u  Hx HLD, HTN, liver ca?    HPI:  67 y/o Elaine-speaking, refused , Willy Fry translated at bedside. Patient has history of liver cirrhosis s/p esophageal varices banding, situs inversus, HF?, bradycardia, HTN, HLD, T2DM, seizure, GERD presents to ED due to subjective fever for the past 4-5 days associated with weakness, malaise, poor appetite and abdominal bloating, symptoms kept persistently worsening and yesterday he started to have LUQ pain, describes pain as dull/cramping in nature, intermittent, no radiation, Pt also notes one episode of bloody stool with a little bright red blood 2 days ago. Patient lives in [place] came to [place] only to visit family, full independent in ADL's, denies recent unintentional weight loss, nausea, vomiting, urinary symptoms, or other symptoms at this time     ED course:  Vitals: BP: 87/60-> 113/92, HR: 96, Temp: 97.9, RR: 18, SpO2: 98% on RA  Labs significant for: WBC 14.02, Hgb 12.9, Plt 471, Na 122, Cl 93, BUN 25, Cr 1.8, Glu 450, Alb 2.1, AST 51, eGFR 41, Lactate 3  UA: +glu >1000  CT a/p:  Total situs inversus. Cirrhosis with a dominant liver mass concerning for primary HCC versus metastasis. Peritoneal carcinomatosis and moderate volume ascites.  In ED given Rocephin 1g x1, Humulin 5u x1, NS bolus 1L x2 (21 Aug 2023 18:33)      PAST MEDICAL & SURGICAL HISTORY:  T2DM (type 2 diabetes mellitus)      History of seizure      History of bradycardia      GERD (gastroesophageal reflux disease)      History of cirrhosis of liver      HLD (hyperlipidemia)      HTN (hypertension)      H/O esophageal varices      Allergies    No Known Allergies    Intolerances        MEDICATIONS  (STANDING):  carvedilol 3.125 milliGRAM(s) Oral every 12 hours  cefTRIAXone   IVPB 2000 milliGRAM(s) IV Intermittent every 24 hours  dextrose 5%. 1000 milliLiter(s) (50 mL/Hr) IV Continuous <Continuous>  dextrose 5%. 1000 milliLiter(s) (100 mL/Hr) IV Continuous <Continuous>  dextrose 50% Injectable 25 Gram(s) IV Push once  dextrose 50% Injectable 12.5 Gram(s) IV Push once  dextrose 50% Injectable 25 Gram(s) IV Push once  glucagon  Injectable 1 milliGRAM(s) IntraMuscular once  heparin   Injectable 5000 Unit(s) SubCutaneous every 12 hours  insulin glargine Injectable (LANTUS) 10 Unit(s) SubCutaneous every morning  insulin lispro (ADMELOG) corrective regimen sliding scale   SubCutaneous three times a day before meals  insulin lispro (ADMELOG) corrective regimen sliding scale   SubCutaneous at bedtime  insulin lispro Injectable (ADMELOG) 6 Unit(s) SubCutaneous before lunch  insulin lispro Injectable (ADMELOG) 6 Unit(s) SubCutaneous before breakfast  insulin lispro Injectable (ADMELOG) 6 Unit(s) SubCutaneous before dinner  phenytoin   Capsule 100 milliGRAM(s) Oral daily  rifAXIMin 550 milliGRAM(s) Oral two times a day  sodium chloride 2 Gram(s) Oral every 6 hours  spironolactone 25 milliGRAM(s) Oral daily

## 2023-08-25 NOTE — CASE MANAGEMENT PROGRESS NOTE - NSCMPROGRESSNOTE_GEN_ALL_CORE
Pt not DC ready today as per the medical team as the pt's HR over night was 150-170's. Pt given dig. Pt with hypotension as well and Minodrine added as well as salt tabs. Pt is DM2 and started on insulin. The bedside nurses are aware to teach the pt and wife who is very involved how to do the injections. The pt is visiting from [place] and he is going back to [place] on 9/8/23. This CM spoke with the daughter, pt, and the wife at the bedside. The pt and family are declining the need for home care services and will follow up with care in [place] in early September. Dr. Sweet aware and in agreement. The pt's son will transport the pt home if medically cleared tomorrow. Pt is independent and ambulating up and down the hallway daily with his wife.

## 2023-08-25 NOTE — PROGRESS NOTE ADULT - PROBLEM SELECTOR PLAN 2
need tissue  biopsy  out pt  to confirm diagnosis   - CT A/P: Highly suspicious for primary HCC versus metastasis. Peritoneal carcinomatosis and moderate volume ascites.  - AFP 4.2 WNL    - Protein/Cr ratio WNL  - Heme/Onc (Dr. Winter group) following - may still need tissue bx for diagnosis and NGS markers studies for potential targeted therapy  - GI (Dr. Devi) following

## 2023-08-25 NOTE — PROGRESS NOTE ADULT - PROBLEM SELECTOR PLAN 9
Expiratory wheezing on exam (on admission); did not appreciate wheezing today  - Pt states no hx of asthma, not on any inhalers  - Continue duonebs PRN   - Now on RA satting at 95%  - Pulm (Dr. Denson) consulted

## 2023-08-25 NOTE — PROGRESS NOTE ADULT - PROBLEM SELECTOR PLAN 1
cont lantus 15 units qam  cont admelog 6 units 3x/day before meals  cont admelog mod dose corrective scale coverage qac/qhs  cont cons cho diet  goal bg 100-180 in hosp setting

## 2023-08-25 NOTE — PROGRESS NOTE ADULT - PROBLEM SELECTOR PLAN 11
Chronic, however on admission presented with hypotension SBP the 80's that improved with IVF   - Continue home coreg with holding parameters  - Missed PM coreg dose yesterday (8/24) as BP was low   - Gave midodrine to increase BP   - Hold home torsemide and ramipril in setting of MAIA and hyponatremia    #HLD  Chronic  - Hold home statins due to transaminitis Chronic, however on admission presented with hypotension SBP the 80's that improved with IVF   - Continue home coreg with holding parameters  - Missed PM coreg dose yesterday (8/24) as BP was low   - Gave midodrine to increase BP 2.5 mg po bid   - Hold home torsemide and ramipril in setting of MAIA and hyponatremia    #HLD  Chronic  - Hold home statins due to transaminitis

## 2023-08-25 NOTE — PROGRESS NOTE ADULT - PROBLEM SELECTOR PLAN 6
As per family, Pt has known Hx of HF w/ EF ~30-40%  - Continue home coreg   - D/michelle digoxin as per cardio  - Digoxin level WNL (8/22), 0.5 low (8/24)  - S/p digoxin x1 as pt is tachy & digoxin level (8/24) is low   - F/u repeat digoxin level  - S/p Lasix 20mg IV x2 for symptomatic ascites   - Continue home spironolactone  - Hold home torsemide due to MAIA and hyponatremia  - F/u TTE   - Cardio Dr. Swift group consulted As per family, Pt has known Hx of HF w/ EF ~30-40%  - Continue home coreg   - D/michelle digoxin as per cardio  - Digoxin level WNL (8/22), 0.5 low (8/24)  - S/p digoxin x1 today (8/25) as pt is tachy & digoxin level (8/24) is low   - F/u repeat digoxin level  - S/p Lasix 20mg IV x2 for symptomatic ascites   - Continue home spironolactone  - Hold home torsemide due to MAIA and hyponatremia  - F/u TTE   - Cardio Dr. Swift group consulted

## 2023-08-25 NOTE — PROGRESS NOTE ADULT - SUBJECTIVE AND OBJECTIVE BOX
[facility] -- Abhi Alvarenga Pannella, Patel, Savella, Goodger, Cohen: Office # [phone]    Follow Up: CHF      Subjective/Observations: Patient seen and examined. Patient awake, alert, resting in bed. No complaints of chest pain, dyspnea, palpitations or dizziness. No signs of orthopnea or PND. Tolerating room air. Continues to be tachycardic 100-110s baseline. Had episode of tachy to 150s overnight for almost 10 min.     REVIEW OF SYSTEMS: All other review of systems are negative unless indicated above    PAST MEDICAL & SURGICAL HISTORY:  T2DM (type 2 diabetes mellitus)      History of seizure      History of bradycardia      GERD (gastroesophageal reflux disease)      History of cirrhosis of liver      HLD (hyperlipidemia)      HTN (hypertension)      H/O esophageal varices    MEDICATIONS  (STANDING):  carvedilol 3.125 milliGRAM(s) Oral every 12 hours  cefTRIAXone   IVPB 2000 milliGRAM(s) IV Intermittent every 24 hours  dextrose 5%. 1000 milliLiter(s) (50 mL/Hr) IV Continuous <Continuous>  dextrose 5%. 1000 milliLiter(s) (100 mL/Hr) IV Continuous <Continuous>  dextrose 50% Injectable 12.5 Gram(s) IV Push once  dextrose 50% Injectable 25 Gram(s) IV Push once  dextrose 50% Injectable 25 Gram(s) IV Push once  glucagon  Injectable 1 milliGRAM(s) IntraMuscular once  heparin   Injectable 5000 Unit(s) SubCutaneous every 12 hours  insulin glargine Injectable (LANTUS) 10 Unit(s) SubCutaneous every morning  insulin lispro (ADMELOG) corrective regimen sliding scale   SubCutaneous three times a day before meals  insulin lispro (ADMELOG) corrective regimen sliding scale   SubCutaneous at bedtime  insulin lispro Injectable (ADMELOG) 6 Unit(s) SubCutaneous before breakfast  insulin lispro Injectable (ADMELOG) 6 Unit(s) SubCutaneous before lunch  insulin lispro Injectable (ADMELOG) 6 Unit(s) SubCutaneous before dinner  midodrine 2.5 milliGRAM(s) Oral <User Schedule>  phenytoin   Capsule 100 milliGRAM(s) Oral daily  rifAXIMin 550 milliGRAM(s) Oral two times a day  senna 1 Tablet(s) Oral at bedtime  spironolactone 25 milliGRAM(s) Oral daily    MEDICATIONS  (PRN):  albuterol/ipratropium for Nebulization 3 milliLiter(s) Nebulizer every 6 hours PRN Shortness of Breath and/or Wheezing  melatonin 3 milliGRAM(s) Oral at bedtime PRN Insomnia    Allergies  No Known Allergies    Vital Signs Last 24 Hrs  T(C): 36.7 (25 Aug 2023 04:16), Max: 37.4 (24 Aug 2023 11:50)  T(F): 98.1 (25 Aug 2023 04:16), Max: 99.4 (24 Aug 2023 11:50)  HR: 106 (25 Aug 2023 10:23) (83 - 157)  BP: 119/78 (25 Aug 2023 10:23) (92/66 - 119/78)  BP(mean): --  RR: 18 (25 Aug 2023 04:16) (17 - 18)  SpO2: 95% (25 Aug 2023 04:16) (94% - 97%)    Parameters below as of 24 Aug 2023 23:33  Patient On (Oxygen Delivery Method): room air      I&O's Summary    24 Aug 2023 07:01  -  25 Aug 2023 07:00  --------------------------------------------------------  IN: 200 mL / OUT: 0 mL / NET: 200 mL      Weight (kg): 59.9 (08-24 @ 15:06)    TELE: -110s, 150s x 1 for 10 min   PHYSICAL EXAM:  Constitutional: NAD, awake and alert, Frail   HEENT: Moist Mucous Membranes, Anicteric  Pulmonary: Non-labored, breath sounds are clear bilaterally, No wheezing, rales or rhonchi  Cardiovascular: Regular, S1 and S2, No murmurs, No rubs, gallops or clicks  Gastrointestinal:  soft, nontender, nondistended   Lymph: No peripheral edema. No lymphadenopathy.   Skin: No visible rashes or ulcers.  Psych:  Mood & affect appropriate      LABS: All Labs Reviewed:                        14.3   18.89 )-----------( 532      ( 25 Aug 2023 06:28 )             42.2                         14.1   14.87 )-----------( 520      ( 24 Aug 2023 06:00 )             41.8                         14.6   12.47 )-----------( 536      ( 23 Aug 2023 06:18 )             44.1     25 Aug 2023 06:28    124    |  95     |  51     ----------------------------<  161    5.3     |  16     |  2.10   24 Aug 2023 16:20    122    |  94     |  42     ----------------------------<  195    4.9     |  20     |  1.90   24 Aug 2023 06:00    121    |  93     |  39     ----------------------------<  362    4.6     |  18     |  1.80     Ca    8.7        25 Aug 2023 06:28  Ca    8.5        24 Aug 2023 16:20  Ca    8.4        24 Aug 2023 06:00  Phos  4.3       25 Aug 2023 06:28  Phos  4.2       24 Aug 2023 06:00  Phos  3.7       23 Aug 2023 06:18  Mg     2.6       25 Aug 2023 06:28  Mg     2.4       24 Aug 2023 06:00  Mg     2.3       23 Aug 2023 06:18    TPro  7.1    /  Alb  1.9    /  TBili  0.6    /  DBili  x      /  AST  54     /  ALT  36     /  AlkPhos  133    25 Aug 2023 06:28  TPro  7.3    /  Alb  2.0    /  TBili  0.6    /  DBili  x      /  AST  52     /  ALT  35     /  AlkPhos  151    24 Aug 2023 06:00  TPro  7.8    /  Alb  2.1    /  TBili  0.8    /  DBili  x      /  AST  57     /  ALT  36     /  AlkPhos  138    23 Aug 2023 06:18   LIVER FUNCTIONS - ( 25 Aug 2023 06:28 )  Alb: 1.9 g/dL / Pro: 7.1 g/dL / ALK PHOS: 133 U/L / ALT: 36 U/L / AST: 54 U/L / GGT: x           Cholesterol: 97 mg/dL (08-22-23 @ 07:33)  HDL Cholesterol: 34 mg/dL (08-22-23 @ 07:33)  Triglycerides, Serum: 69 mg/dL (08-22-23 @ 07:33)    12 Lead ECG:   Ventricular Rate 112 BPM    Atrial Rate 112 BPM    P-R Interval 156 ms    QRS Duration 122 ms    Q-T Interval 350 ms    QTC Calculation(Bazett) 477 ms    P Axis 78 degrees    R Axis -47 degrees    T Axis 84 degrees    Diagnosis Line Sinus tachycardia  Nonspecific intraventricular conduction delay  Left axis deviation  Anterior infarct  Abnormal ECG  Confirmed by kedar Toledo ([id number]) on 8/22/2023 4:11:03 PM (08-22-23 @ 12:23)

## 2023-08-25 NOTE — PROGRESS NOTE ADULT - ASSESSMENT
68 M with End Stage Liver Disease/Decompensated Cirrhosis   now wtih Ascites/Liver Mass/?Peritoneal Carcinomatosis    IVAB for SBP prophylaxis  Oncology Eval  IR for paracentesis  fluid studies ordered including cytopathology  AFP wnl  xifaxin 550 mg bid  renal eval  continue carvedilol    I reviewed the overnight course of events on the unit, re-confirming the patient history. I discussed the care with the patient and their family  Differential diagnosis and plan of care discussed with patient after the evaluation  40 minutes spent on total encounter of which more than fifty percent of the encounter was spent counseling and/or coordinating care by the attending physician.  Advanced care planning was discussed with patient and family.  Advanced care planning forms were reviewed and discussed.  Risks, benefits and alternatives of gastroenterologic procedures were discussed in detail and all questions were answered. 68 M with End Stage Liver Disease/Decompensated Cirrhosis   now wtih Ascites/Liver Mass/?Peritoneal Carcinomatosis    IVAB for SBP prophylaxis  Oncology Eval  s/p paracentesis  fluid studies ordered including cytopathology  AFP wnl  xifaxin 550 mg bid  renal eval  continue carvedilol    I reviewed the overnight course of events on the unit, re-confirming the patient history. I discussed the care with the patient and their family  Differential diagnosis and plan of care discussed with patient after the evaluation  40 minutes spent on total encounter of which more than fifty percent of the encounter was spent counseling and/or coordinating care by the attending physician.  Advanced care planning was discussed with patient and family.  Advanced care planning forms were reviewed and discussed.  Risks, benefits and alternatives of gastroenterologic procedures were discussed in detail and all questions were answered.

## 2023-08-25 NOTE — PROGRESS NOTE ADULT - NEGATIVE GENERAL SYMPTOMS
Anesthesia Pre Eval Note    Anesthesia ROS/Med Hx    Overall Review:  EKG was reviewed and Echo was reviewed     Anesthetic Complication History:  Patient does not have a history of anesthetic complications      Pulmonary Review:  Patient does not have a pulmonary history      Neuro/Psych Review:  Patient does not have a neuro/psych history       Cardiovascular Review:  Exercise tolerance: good (>4 METS)Positive for CAD  Positive for hypertension - well controlled  Positive for hyperlipidemia    GI/HEPATIC/RENAL Review:    Positive for GERD - well controlled  Positive for renal disease - chronic renal insufficiency    End/Other Review:  Positive for diabetes - type 2 - well controlled  Positive for obesity class II - 35.00 - 39.99  Positive for arthritis  Additional Results:  EKG:  Encounter Date: 12/08/20  -Electrocardiogram 12-Lead       Result                      Value                           Ventricular Rate EKG/M*     84                              Atrial Rate (BPM)           84                              KS-Interval (MSEC)          148                             QRS-Interval (MSEC)         80                              QT-Interval (MSEC)          332                             QTc                         392                             P Axis (Degrees)            47                              R Axis (Degrees)            -38                             T Axis (Degrees)            -37                             REPORT TEXT                                             Normal sinus rhythm   with sinus arrhythmia   Left axis deviation   Possible   Anterior infarct   , age undetermined   T wave abnormality, consider lateral ischemia   Abnormal ECG   No previous ECGs available   Confirmed by SHWETA LUCIANO MD ([id number]) on 12/9/2020 12:09:35 PM       ALLERGIES:   -- Contrast Media -- HIVES   -- No Name Available -- Other (See Comments)    --  IV Dye- unknown   -- Sulfa Antibiotics -- Other (See Comments)    --   unknown       Lab Results       Component                Value               Date                       WBC                      12.4 (H)            12/08/2020                 WBC                      13.0 (H)            08/19/2020                 RBC                      5.19                12/08/2020                 RBC                      4.98                08/19/2020                 HGB                      14.2                12/08/2020                 HGB                      13.9                08/19/2020                 HCT                      46.1                12/08/2020                 HCT                      44.8                08/19/2020                 MCHC                     30.8 (L)            12/08/2020                 MCHC                     31.0 (L)            08/19/2020                 SODIUM                   139                 12/07/2020                 SODIUM                   140                 07/17/2020                 POTASSIUM                3.8                 12/07/2020                 POTASSIUM                3.7                 07/17/2020                 CHLORIDE                 104                 12/07/2020                 CHLORIDE                 106                 07/17/2020                 CO2                      28                  12/07/2020                 CO2                      28                  07/17/2020                 GLUCOSE                  75                  12/07/2020                 GLUCOSE                  107 (H)             07/17/2020                 BUN                      16                  12/07/2020                 BUN                      13                  07/17/2020                 CREATININE               0.79                12/07/2020                 CREATININE               0.70                07/17/2020                 GFRESTIMATE              85 (L)              12/07/2020                 GFRA                                          07/17/2020             eGFR results = or >90 mL/min/1.73m2 = Normal kidney function.       GFRA                     >90                 07/17/2020                 GFRNA                    86                  07/17/2020                 GFRNA                                        07/17/2020             eGFR 60 - 89 mL/min/1.73m2 = Mild decrease in kidney function.       CALCIUM                  9.5                 12/07/2020                 CALCIUM                  9.1                 07/17/2020                 PLT                      240                 12/08/2020                 PLT                      218                 08/19/2020                 PTT                      22                  12/07/2020                 PTT                      22                  01/24/2019                 PTT                      NOT APPLICABLE      01/24/2019                 INR                      1.0                 12/07/2020                 INR                      1.0                 01/24/2019             Past Medical History:  No date: Arthritis  No date: Breast cancer (CMS/[facility])      Comment:  left  3/18/2019: Chest pain  No date: Chronic kidney disease  No date: Diabetes mellitus (CMS/[facility])  No date: Essential (primary) hypertension  No date: High cholesterol  No date: History of pneumonia  3/18/2019: Syncope    Past Surgical History:  No date: Anes arthroscopy knee,surg  No date: Breast lumpectomy; Left  No date: Breast surgery  No date: Cyst removal      Comment:  from finger  No date: Foot surgery  No date: Hysterectomy  No date: Loop recorder implant      Comment:  removed 3/2020       Prior to Admission medications :  Medication pantoprazole (PROTONIX) 40 MG tablet, Sig Take 40 mg by mouth daily., Start Date , End Date , Taking? Yes, Authorizing Provider Outside Provider    Medication gabapentin (NEURONTIN) 300 MG capsule, Sig Take 600 mg by mouth 2 times daily., Start Date , End Date , Taking? Yes, Authorizing  Provider Outside Provider    Medication metoPROLOL succinate (TOPROL-XL) 25 MG 24 hr tablet, Sig Take 25 mg by mouth daily., Start Date , End Date , Taking? Yes, Authorizing Provider Outside Provider    Medication acetaminophen (TYLENOL) 500 MG tablet, Sig Take 1,000 mg by mouth every 6 hours as needed for Pain., Start Date , End Date , Taking? Yes, Authorizing Provider Outside Provider    Medication CALCIUM CITRATE-VITAMIN D PO, Sig Take 1 tablet by mouth daily. Holding per oncology until 1 week after surgery, Start Date , End Date , Taking? Yes, Authorizing Provider Outside Provider    Medication anastrozole (ARIMIDEX) 1 MG tablet, Sig TK 1 T PO QD, Start Date 12/18/19, End Date , Taking? Yes, Authorizing Provider Outside Provider    Medication SPIRONOLACTONE PO, Sig Take 25 mg by mouth daily. , Start Date , End Date , Taking? Yes, Authorizing Provider Outside Provider    Medication Multiple Vitamins-Minerals (MULTIVITAMIN PO), Sig Take 1 tablet by mouth daily. , Start Date , End Date , Taking? Yes, Authorizing Provider Outside Provider    Medication simvastatin (ZOCOR) 40 MG tablet, Sig Take 40 mg by mouth nightly. , Start Date 1/16/19, End Date , Taking? Yes, Authorizing Provider Outside Provider    Medication losartan (COZAAR) 25 MG tablet, Sig Take 25 mg by mouth daily., Start Date 2/7/19, End Date , Taking? Yes, Authorizing Provider Outside Provider    Medication glipiZIDE (GLUCOTROL) 5 MG 24 hr tablet, Sig Take 5 mg by mouth daily., Start Date , End Date , Taking? Yes, Authorizing Provider Outside Provider    Medication allopurinol (ZYLOPRIM) 300 MG tablet, Sig Take 300 mg by mouth daily. , Start Date 11/17/17, End Date , Taking? Yes, Authorizing Provider Outside Provider    Medication aspirin (ASPIR-81) 81 MG EC tablet, Sig , Start Date , End Date , Taking? Yes, Authorizing Provider Outside Provider            Relevant Problems   No relevant active problems       Physical Exam     Airway   Mallampati:  III  TM Distance: <3 FB  Neck ROM: Full  Neck: Short and Thick    Cardiovascular    Cardio Rhythm: Regular    Dental Exam    Patient has:  Upper dentures    Pulmonary Exam  Pulmonary exam normal    Abdominal Exam    Patient Demonstrates:  Obese      Anesthesia Plan    Phone call attempted:   Case discussed with Patient or Representative    ASA Status: 3    Anesthesia Type: Spinal    Induction: Intravenous  Maintenance: TIVA  Premedication: IV      Risks Discussed: Nausea, Vomiting, Headache, Hypotension, Dental Injury, Post-op Intubation, Intra-operative Awareness, ICU Admit, Emergence Delirium, Allergic Reaction, Nerve Injury, Sore Throat, Need for Blood Transfusion and Aspiration    Post-op Pain Management: Per Surgeon      Checklist  Reviewed: NPO Status, Allergies, Medications, Problem list, Past Med History, Patient Summary, Lab Results and EKG    Informed Consent  The proposed anesthetic plan, including its risks and benefits, have been discussed with the Patient  - along with the risks and benefits of alternatives.  Questions were encouraged and answered and the patient and/or representative understands and agrees to proceed.     Informed Consent for Blood: Consented  Blood Products: Not Anticipated     no fever/no chills

## 2023-08-25 NOTE — PROGRESS NOTE ADULT - ASSESSMENT
Physical Exam:   Vital Signs Last 24 Hrs  T(C): 36.4 (25 Aug 2023 12:07), Max: 37.1 (24 Aug 2023 23:33)  T(F): 97.6 (25 Aug 2023 12:07), Max: 98.7 (24 Aug 2023 23:33)  HR: 105 (25 Aug 2023 14:30) (83 - 157)  BP: 106/75 (25 Aug 2023 14:30) (92/66 - 120/89)  BP(mean): --  RR: 18 (25 Aug 2023 14:30) (17 - 18)  SpO2: 97% (25 Aug 2023 14:30) (94% - 97%)    Parameters below as of 25 Aug 2023 14:30  Patient On (Oxygen Delivery Method): room air             CAPILLARY BLOOD GLUCOSE      POCT Blood Glucose.: 222 mg/dL (25 Aug 2023 11:45)  POCT Blood Glucose.: 155 mg/dL (25 Aug 2023 07:50)  POCT Blood Glucose.: 216 mg/dL (24 Aug 2023 21:12)  POCT Blood Glucose.: 198 mg/dL (24 Aug 2023 16:44)      Cholesterol, Serum: 113 mg/dL (05.19.21 @ 08:36)     HDL Cholesterol, Serum: 22 mg/dL (05.19.21 @ 08:36)     LDL Cholesterol Calculated: 66 mg/dL (05.19.21 @ 08:36)     DIET: CC  >50%

## 2023-08-25 NOTE — PROGRESS NOTE ADULT - PROBLEM SELECTOR PLAN 1
Visit Information Date & Time Provider Department Dept. Phone Encounter #  
 3/6/2017  9:40 AM Jose Martin Bernard MD [facility] [phone] [id number] Follow-up Instructions Return in about 6 months (around 9/6/2017). Your Appointments 2/1/2018  9:00 AM  
Office Visit with Lance Da Silva MD  
[facility] Appt Note: PSA  
 [address] [place] [address] [address]  
[phone] [address] [address] Upcoming Health Maintenance Date Due DTaP/Tdap/Td series (1 - Tdap) 7/27/1961 ZOSTER VACCINE AGE 60> 7/27/2000 GLAUCOMA SCREENING Q2Y 7/27/2005 Pneumococcal 65+ High/Highest Risk (1 of 2 - PCV13) 7/27/2005 MEDICARE YEARLY EXAM 7/27/2005 INFLUENZA AGE 9 TO ADULT 8/1/2016 Allergies as of 3/6/2017  Review Complete On: 3/6/2017 By: Jose Martin Bernard MD  
 Not on File Current Immunizations  Never Reviewed No immunizations on file. Not reviewed this visit You Were Diagnosed With   
  
 Codes Comments Coronary artery disease involving native coronary artery of native heart without angina pectoris    -  Primary ICD-10-CM: I25.10 ICD-9-CM: 414.01 Hypercholesterolemia     ICD-10-CM: E78.00 ICD-9-CM: 272.0 Vitals BP Pulse Height(growth percentile) Weight(growth percentile) SpO2 BMI  
 122/80 64 6' 2\" (1.88 m) 219 lb (99.3 kg) 98% 28.12 kg/m2 Smoking Status Former Smoker Vitals History BMI and BSA Data Body Mass Index Body Surface Area  
 28.12 kg/m 2 2.28 m 2 Preferred Pharmacy Pharmacy Name Phone [phone] [address], [address] AT [address] [phone] Your Updated Medication List  
  
   
This list is accurate as of: 3/6/17 10:17 AM.  Always use your most recent med list.  
  
  
  
  
 aspirin delayed-release 81 mg tablet Take  by mouth daily. FISH OIL 1,000 mg Cap Generic drug:  omega-3 fatty acids-vitamin e Take 1 Cap by mouth. LUMIGAN OP Instill  in eye.  
  
 metoprolol succinate 25 mg XL tablet Commonly known as:  TOPROL-XL TK 1 T PO QD  
  
 multivitamin tablet Commonly known as:  ONE A DAY Take 1 Tab by mouth daily. SIMBRINZA 1-0.2 % Drps Generic drug:  brinzolamide-brimonidine Apply  to eye.  
  
 simvastatin 80 mg tablet Commonly known as:  ZOCOR Take 1 Tab by mouth nightly. therapeutic multivitamin tablet Commonly known as:  [facility] Take 1 Tab by Mouth Once a Day. We Performed the Following AMB POC EKG ROUTINE W/ 12 LEADS, INTER & REP [58676 CPT(R)] Follow-up Instructions Return in about 6 months (around 9/6/2017). Introducing [address]! Dear Jazlyn Ramirez: Thank you for requesting a [facility]IVA account. Our records indicate that you already have an active VIVA account. You can access your account anytime at [email or web address] Did you know that you can access your hospital and ER discharge instructions at any time in VIVA? You can also review all of your test results from your hospital stay or ER visit. Additional Information If you have questions, please visit the Frequently Asked Questions section of the VIVA website at [email or web address] Remember, VIVA is NOT to be used for urgent needs. For medical emergencies, dial 911. Now available from your iPhone and Android! Please provide this summary of care documentation to your next provider. Your primary care clinician is listed as Asif Perez. If you have any questions after today's visit, please call [phone]. Type 2 A1c 9.4% adm  increase lantus to 15 units in AM  c/w admelog premeal 6 units  c/w moderate ISS  CC diet and accucheck ACHS  Recommend endocrine-Perlman onconsult  FU appt: TBA  DSC recommendations: return to home with 20 units Lantus and glimepride, hold metformin, and increase glucose monitoring  diabetes education provided as documented above  Diabetes support info and cell # [phone] given   Goal 100-180 mg/dL; 140-180 mg/dL in critical care areas

## 2023-08-25 NOTE — PROGRESS NOTE ADULT - ASSESSMENT
69 y/o man visiting from [place], hx cirrhosis post esophageal varices banding 2020 2021, situs inversus, HF?, bradycardia, HTN, HLD, T2DM, seizure, GERD  Came to ER c/o 4-5 d weakness, malaise, LUQ abdominal pain, abdomen bloating  ED course:  Vitals: BP: 87/60-> 113/92, HR: 96, Temp: 97.9, RR: 18, SpO2: 98% on RA  Labs significant for: WBC 14.02, Hgb 12.9, Plt 471, Na 122, Cl 93, BUN 25, Cr 1.8, Glu 450, Alb 2.1, AST 51, eGFR 41, Lactate 3  UA: +glu >1000  CT a/p:  Total situs inversus. Cirrhosis w exophytic luis enhancing low density mass extending to diaphragm 5.2x4.1cm, add 5cm cyst, low density lesions too small to renetta. Mod ascites w abd carcinomatosis and implants noted up to 3.9cm    wife denies ever had imaging of abdomen done, reports first endoscopy was done due to "ulcer bleed"  dc Etoh and tobaco 1999  no night sweats fever anorecia weight loss, luis edema    -hx esophageal banding 2020, 2021, CT a/p w liver cirrhosis, 5,2cm exophytic liver mass mod scites and sig abdominal carcinomatosis  -agree worrisome for met hepatocellular carcinoma  -have ordered AFP, may still need tissue bx for diagnosis and NGS markers studies for potential targeted therapy  -GI consulted  AFP normal  s/p paracentesis  cytology suspicious for malignancy and await IHC  continue present medical/ID and GI evaluation    further recommendations pending above  discussed w pt and family

## 2023-08-25 NOTE — PROGRESS NOTE ADULT - ASSESSMENT
69 y/o Mele-speaking, refused , Son Ang translated at bedside. Patient has history of liver cirrhosis s/p esophageal varices banding, situs inversus, HF?, bradycardia, HTN, HLD, T2DM, seizure, GERD presents to ED due to subjective fever for the past 4-5 days associated with weakness, malaise, poor appetite and abdominal bloating, symptoms kept persistently worsening and yesterday he started to have LUQ pain,    wheezing  HTN  DM  Cirrhosis  GERD  Liver mass  esoph varices    4750 cc drained - s/p paracentesis  on o2 support  on ABX  vs noted - TACHY    ct imaging reviewed  GI eval  cirrhosis - liver mass  MAIA - I and O - serial labs - replete lytes  lung bases - clear -   wheezing - episodic - on NEBS prn  monitor VS and HD and Sat  GOC discussion  DM care  serial FS  prognosis guarded  emp ABX in progress - eval for SBP

## 2023-08-25 NOTE — PROGRESS NOTE ADULT - SUBJECTIVE AND OBJECTIVE BOX
Interval History:  remains weak  Chart reviewed and events noted;   Overnight events:    MEDICATIONS  (STANDING):  cefTRIAXone   IVPB 1000 milliGRAM(s) IV Intermittent once  dextrose 5%. 1000 milliLiter(s) (50 mL/Hr) IV Continuous <Continuous>  dextrose 5%. 1000 milliLiter(s) (100 mL/Hr) IV Continuous <Continuous>  dextrose 50% Injectable 25 Gram(s) IV Push once  dextrose 50% Injectable 12.5 Gram(s) IV Push once  dextrose 50% Injectable 25 Gram(s) IV Push once  glucagon  Injectable 1 milliGRAM(s) IntraMuscular once  heparin   Injectable 5000 Unit(s) SubCutaneous every 12 hours  insulin glargine Injectable (LANTUS) 15 Unit(s) SubCutaneous every morning  insulin lispro (ADMELOG) corrective regimen sliding scale   SubCutaneous three times a day before meals  insulin lispro (ADMELOG) corrective regimen sliding scale   SubCutaneous at bedtime  insulin lispro Injectable (ADMELOG) 6 Unit(s) SubCutaneous before lunch  insulin lispro Injectable (ADMELOG) 6 Unit(s) SubCutaneous before breakfast  insulin lispro Injectable (ADMELOG) 6 Unit(s) SubCutaneous before dinner  metoprolol tartrate 25 milliGRAM(s) Oral two times a day  midodrine 2.5 milliGRAM(s) Oral <User Schedule>  phenytoin   Capsule 100 milliGRAM(s) Oral daily  rifAXIMin 550 milliGRAM(s) Oral two times a day  senna 2 Tablet(s) Oral at bedtime  simethicone 80 milliGRAM(s) Chew daily  sodium chloride 2 Gram(s) Oral every 6 hours  spironolactone 25 milliGRAM(s) Oral daily    MEDICATIONS  (PRN):  albuterol/ipratropium for Nebulization 3 milliLiter(s) Nebulizer every 6 hours PRN Shortness of Breath and/or Wheezing  melatonin 3 milliGRAM(s) Oral at bedtime PRN Insomnia      Vital Signs Last 24 Hrs  T(C): 36.4 (25 Aug 2023 12:07), Max: 37.1 (24 Aug 2023 23:33)  T(F): 97.6 (25 Aug 2023 12:07), Max: 98.7 (24 Aug 2023 23:33)  HR: 105 (25 Aug 2023 14:30) (83 - 157)  BP: 106/75 (25 Aug 2023 14:30) (92/66 - 120/89)  BP(mean): --  RR: 18 (25 Aug 2023 14:30) (17 - 18)  SpO2: 97% (25 Aug 2023 14:30) (94% - 97%)    Parameters below as of 25 Aug 2023 14:30  Patient On (Oxygen Delivery Method): room air        PHYSICAL EXAM  General: adult in NAD, chronically ill appearing  HEENT: clear oropharynx, anicteric sclera, pink conjunctivae  Neck: supple  CV: normal S1S2 with no murmur rubs or gallops  Lungs: clear to auscultation, no wheezes, no rhales  Abdomen: soft non-tender non-distended, no hepato/splenomegaly  Ext: no clubbing cyanosis or edema  Skin: no rashes and no petichiae  Neuro: alert and oriented X3 no focal deficits      LABS:  CBC Full  -  ( 25 Aug 2023 06:28 )  WBC Count : 18.89 K/uL  RBC Count : 5.24 M/uL  Hemoglobin : 14.3 g/dL  Hematocrit : 42.2 %  Platelet Count - Automated : 532 K/uL  Mean Cell Volume : 80.5 fl  Mean Cell Hemoglobin : 27.3 pg  Mean Cell Hemoglobin Concentration : 33.9 gm/dL  Auto Neutrophil # : 13.60 K/uL  Auto Lymphocyte # : 2.46 K/uL  Auto Monocyte # : 2.46 K/uL  Auto Eosinophil # : 0.38 K/uL  Auto Basophil # : 0.00 K/uL  Auto Neutrophil % : 72.0 %  Auto Lymphocyte % : 13.0 %  Auto Monocyte % : 13.0 %  Auto Eosinophil % : 2.0 %  Auto Basophil % : 0.0 %    08-25    124<L>  |  95<L>  |  51<H>  ----------------------------<  161<H>  5.3   |  16<L>  |  2.10<H>    Ca    8.7      25 Aug 2023 06:28  Phos  4.3     08-25  Mg     2.6     08-25    TPro  7.1  /  Alb  1.9<L>  /  TBili  0.6  /  DBili  x   /  AST  54<H>  /  ALT  36  /  AlkPhos  133<H>  08-25        fe studies      WBC trend  18.89 K/uL (08-25-23 @ 06:28)  14.87 K/uL (08-24-23 @ 06:00)  12.47 K/uL (08-23-23 @ 06:18)      Hgb trend  14.3 g/dL (08-25-23 @ 06:28)  14.1 g/dL (08-24-23 @ 06:00)  14.6 g/dL (08-23-23 @ 06:18)      plt trend  532 K/uL (08-25-23 @ 06:28)  520 K/uL (08-24-23 @ 06:00)  536 K/uL (08-23-23 @ 06:18)        RADIOLOGY & ADDITIONAL STUDIES:    Cytopathology - Non Gyn Report (08.23.23 @ 12:35)   Cytopathology - Non Gyn Report:   ACCESSION No: [id number]   Patient: HERMANN XAVIER   Accession: [id number]   Collected Date/Time: 8/23/2023 12:35 EDT   Received Date/Time: 8/23/2023 13:07 EDT   Non-Gynecologic Report - Auth (Verified)   Specimen(s) Submitted   Peritoneal fluid   Final Diagnosis   SUSPICIOUS FOR NEOPLASM.   Few atypical cells mixed with lymphocytes, granulocytes, histiocytes and   mesothelial cells.   Pending immunohistochemical analysis with addendum to follow.   Screened by: Mynor Braswell MD   Verified by: Mynor Braswell MD   (Electronic Signature)   Reported on: 08/24/23 12:17 EDT, [facility], [address] [place] [id number]   _________________________________________________________________

## 2023-08-25 NOTE — PROGRESS NOTE ADULT - PROBLEM SELECTOR PLAN 12
Concern for starting A/C on admission as pt reported hematochezia  - Restart heparin 5000U q12h    #GOC discussion - full code

## 2023-08-25 NOTE — PROGRESS NOTE ADULT - SUBJECTIVE AND OBJECTIVE BOX
Patient is a 68y old  Male who presents with a chief complaint of Liver Cirrhosis with ascites, Liver cancer, hyponatremia (25 Aug 2023 15:28)    updates: pt report no events, tolerating CC diet. discussed D/C home w/ basal insulin. hold metformin r/t low GFR, can c/w glimepiride 1mg daily. script sent for 20 units lantus and supplies. education provided, pt states he was using insulin many years ago. provided insulin pen administration instruction handout and injection sites. reviewed s/s of hypoglycemia and management 15/15 rule. CC diet and consistent eating patterns. pt states will f/u w/ endocrinologist in [place].    HPI:  69 y/o Mele-speaking, refused , Son Ang translated at bedside. Patient has history of liver cirrhosis s/p esophageal varices banding, situs inversus, HF?, bradycardia, HTN, HLD, T2DM, seizure, GERD presents to ED due to subjective fever for the past 4-5 days associated with weakness, malaise, poor appetite and abdominal bloating, symptoms kept persistently worsening and yesterday he started to have LUQ pain, describes pain as dull/cramping in nature, intermittent, no radiation, Pt also notes one episode of bloody stool with a little bright red blood 2 days ago. Patient lives in [place] came to [place] only to visit family, full independent in ADL's, denies recent unintentional weight loss, nausea, vomiting, urinary symptoms, or other symptoms at this time     ED course:  Vitals: BP: 87/60-> 113/92, HR: 96, Temp: 97.9, RR: 18, SpO2: 98% on RA  Labs significant for: WBC 14.02, Hgb 12.9, Plt 471, Na 122, Cl 93, BUN 25, Cr 1.8, Glu 450, Alb 2.1, AST 51, eGFR 41, Lactate 3  UA: +glu >1000  CT a/p:  Total situs inversus. Cirrhosis with a dominant liver mass concerning for primary HCC versus metastasis. Peritoneal carcinomatosis and moderate volume ascites.  In ED given Rocephin 1g x1, Humulin 5u x1, NS bolus 1L x2 (21 Aug 2023 18:33)      PAST MEDICAL & SURGICAL HISTORY:  T2DM (type 2 diabetes mellitus)      History of seizure      History of bradycardia      GERD (gastroesophageal reflux disease)      History of cirrhosis of liver      HLD (hyperlipidemia)      HTN (hypertension)      H/O esophageal varices          REVIEW OF SYSTEMS  General:	as above  Respiratory: NAD, No SOB, no cough  Cardiovascular: No chest pain, no palpitations	  Endocrine: no polyuria, no polydipsia, or S/S of hypoglycemia  Neuro: denies numbess, no tingling	  Other:	      Allergies    No Known Allergies    Intolerances        MEDICATIONS  (STANDING):  cefTRIAXone   IVPB 1000 milliGRAM(s) IV Intermittent once  dextrose 5%. 1000 milliLiter(s) (50 mL/Hr) IV Continuous <Continuous>  dextrose 5%. 1000 milliLiter(s) (100 mL/Hr) IV Continuous <Continuous>  dextrose 50% Injectable 25 Gram(s) IV Push once  dextrose 50% Injectable 12.5 Gram(s) IV Push once  dextrose 50% Injectable 25 Gram(s) IV Push once  glucagon  Injectable 1 milliGRAM(s) IntraMuscular once  heparin   Injectable 5000 Unit(s) SubCutaneous every 12 hours  insulin glargine Injectable (LANTUS) 15 Unit(s) SubCutaneous every morning  insulin lispro (ADMELOG) corrective regimen sliding scale   SubCutaneous three times a day before meals  insulin lispro (ADMELOG) corrective regimen sliding scale   SubCutaneous at bedtime  insulin lispro Injectable (ADMELOG) 6 Unit(s) SubCutaneous before lunch  insulin lispro Injectable (ADMELOG) 6 Unit(s) SubCutaneous before breakfast  insulin lispro Injectable (ADMELOG) 6 Unit(s) SubCutaneous before dinner  metoprolol tartrate 25 milliGRAM(s) Oral two times a day  midodrine 2.5 milliGRAM(s) Oral <User Schedule>  phenytoin   Capsule 100 milliGRAM(s) Oral daily  rifAXIMin 550 milliGRAM(s) Oral two times a day  senna 2 Tablet(s) Oral at bedtime  simethicone 80 milliGRAM(s) Chew daily  sodium chloride 2 Gram(s) Oral every 6 hours  spironolactone 25 milliGRAM(s) Oral daily

## 2023-08-25 NOTE — PROGRESS NOTE ADULT - PROBLEM SELECTOR PLAN 7
Chronic  - In ED: Glu 450 on admission, downtrended to 269 s/p Humulin 5U  - Hgb A1C 9.4%  - Hold home oral meds  - Continue Moderate dose insulin corrective scale  - Continue Lispro 6U pre-meals and Lantus 10U AM - blood sugars now controlled   - Endo (Dr. Perlman) consulted  - Hypoglycemia protocol, fingerstick glucose QAC&HS

## 2023-08-25 NOTE — PROGRESS NOTE ADULT - SUBJECTIVE AND OBJECTIVE BOX
[facility]  Yo Colin PA-C  [address] [place] [address]  [phone]      INTERVAL HPI/OVERNIGHT EVENTS:  Pt s/e with family members at bedside  No abdominal pain or GI complaints s/p paracentesis    MEDICATIONS  (STANDING):  cefTRIAXone   IVPB 2000 milliGRAM(s) IV Intermittent every 24 hours  dextrose 5%. 1000 milliLiter(s) (50 mL/Hr) IV Continuous <Continuous>  dextrose 5%. 1000 milliLiter(s) (100 mL/Hr) IV Continuous <Continuous>  dextrose 50% Injectable 12.5 Gram(s) IV Push once  dextrose 50% Injectable 25 Gram(s) IV Push once  dextrose 50% Injectable 25 Gram(s) IV Push once  glucagon  Injectable 1 milliGRAM(s) IntraMuscular once  heparin   Injectable 5000 Unit(s) SubCutaneous every 12 hours  insulin glargine Injectable (LANTUS) 10 Unit(s) SubCutaneous every morning  insulin lispro (ADMELOG) corrective regimen sliding scale   SubCutaneous three times a day before meals  insulin lispro (ADMELOG) corrective regimen sliding scale   SubCutaneous at bedtime  insulin lispro Injectable (ADMELOG) 6 Unit(s) SubCutaneous before lunch  insulin lispro Injectable (ADMELOG) 6 Unit(s) SubCutaneous before breakfast  insulin lispro Injectable (ADMELOG) 6 Unit(s) SubCutaneous before dinner  metoprolol tartrate 25 milliGRAM(s) Oral two times a day  midodrine 2.5 milliGRAM(s) Oral <User Schedule>  phenytoin   Capsule 100 milliGRAM(s) Oral daily  rifAXIMin 550 milliGRAM(s) Oral two times a day  senna 1 Tablet(s) Oral at bedtime  spironolactone 25 milliGRAM(s) Oral daily    MEDICATIONS  (PRN):  albuterol/ipratropium for Nebulization 3 milliLiter(s) Nebulizer every 6 hours PRN Shortness of Breath and/or Wheezing  melatonin 3 milliGRAM(s) Oral at bedtime PRN Insomnia      Allergies  No Known Allergies    PHYSICAL EXAM:   Vital Signs:  Vital Signs Last 24 Hrs  T(C): 36.4 (25 Aug 2023 12:07), Max: 37.1 (24 Aug 2023 23:33)  T(F): 97.6 (25 Aug 2023 12:07), Max: 98.7 (24 Aug 2023 23:33)  HR: 102 (25 Aug 2023 12:07) (83 - 157)  BP: 120/89 (25 Aug 2023 12:07) (92/66 - 120/89)  BP(mean): --  RR: 18 (25 Aug 2023 12:07) (17 - 18)  SpO2: 94% (25 Aug 2023 12:07) (94% - 97%)    Parameters below as of 25 Aug 2023 12:07  Patient On (Oxygen Delivery Method): room air      Daily Height in cm: 175.26 (24 Aug 2023 15:06)    Daily Weight in k.8 (25 Aug 2023 04:16)    GENERAL:  Appears stated age  HEENT:  NC/AT  CHEST:  Full & symmetric excursion  HEART:  Regular rhythm  ABDOMEN:  Soft, non-tender, non-distended  EXTEREMITIES:  no cyanosis  SKIN:  No rash  NEURO:  Alert      LABS:                        14.3   18.89 )-----------( 532      ( 25 Aug 2023 06:28 )             42.2     08-25    124<L>  |  95<L>  |  51<H>  ----------------------------<  161<H>  5.3   |  16<L>  |  2.10<H>    Ca    8.7      25 Aug 2023 06:28  Phos  4.3     08-25  Mg     2.6     08-25    TPro  7.1  /  Alb  1.9<L>  /  TBili  0.6  /  DBili  x   /  AST  54<H>  /  ALT  36  /  AlkPhos  133<H>  08-25      Urinalysis Basic - ( 25 Aug 2023 06:28 )    Color: x / Appearance: x / SG: x / pH: x  Gluc: 161 mg/dL / Ketone: x  / Bili: x / Urobili: x   Blood: x / Protein: x / Nitrite: x   Leuk Esterase: x / RBC: x / WBC x   Sq Epi: x / Non Sq Epi: x / Bacteria: x

## 2023-08-25 NOTE — PROGRESS NOTE ADULT - PROBLEM SELECTOR PLAN 8
Patient reports one episode of red bright blood in the stools, denies history of hemorrhoids.   - Patient had 1 bloody BM since admission (8/23)  - No further bleed noticed   - No intervention at this time as Hgb is WNL - f/u H/H closely   - GI (Dr. Devi) following Patient reports one episode of red bright blood in the stools, denies history of hemorrhoids.   - Patient had 1 bloody BM since admission (8/23)  - No further gi bleed noticed   - No intervention at this time as Hgb is WNL - f/u H/H closely   - GI (Dr. Devi) following

## 2023-08-25 NOTE — PROGRESS NOTE ADULT - ASSESSMENT
Hyponatremia: Hypovolemic  MAIA, CKD 3: prerenal azotemia  Cirrhosis, Liver mass, Peritoneal carcinomatosis, Ascites  Hypertension    08/22/23: Improving sodium levels and renal indices. Gentle IV hydration. Monitor blood sugar levels. Insulin coverage as needed.   Malignancy work up. D/w patients family at bedside. Will follow electrolytes and renal function trend.   08/23/23: Sodium trending down. For IR paracentesis. Received IV lasix for SOB. Maintain PO fluid restriction. Overall prognosis poor. Low potassium diet. PRN lokelma if potassium trending up. D/w pt's son at bedside   08/24/23: Low sodium. PO fluid restriction. Salt tabs. Avoid hypotonic fluids. D/w patients family at bedside.   08/25/23: Improving sodium levels. Maintain PO fluid restriction. Avoid hypotonic fluids. D/w pt's son at bedside

## 2023-08-25 NOTE — PROGRESS NOTE ADULT - SUBJECTIVE AND OBJECTIVE BOX
Patient is a 68y old  Male who presents with a chief complaint of Liver Cirrhosis with ascites, Liver cancer, hyponatremia (23 Aug 2023 11:55)    Patient seen in follow up for hyponatremia.        PAST MEDICAL HISTORY:  T2DM (type 2 diabetes mellitus)    History of seizure    History of bradycardia    GERD (gastroesophageal reflux disease)    History of cirrhosis of liver    HLD (hyperlipidemia)    HTN (hypertension)        MEDICATIONS  (STANDING):  cefTRIAXone   IVPB 1000 milliGRAM(s) IV Intermittent once  dextrose 5%. 1000 milliLiter(s) (100 mL/Hr) IV Continuous <Continuous>  dextrose 5%. 1000 milliLiter(s) (50 mL/Hr) IV Continuous <Continuous>  dextrose 50% Injectable 12.5 Gram(s) IV Push once  dextrose 50% Injectable 25 Gram(s) IV Push once  dextrose 50% Injectable 25 Gram(s) IV Push once  glucagon  Injectable 1 milliGRAM(s) IntraMuscular once  heparin   Injectable 5000 Unit(s) SubCutaneous every 12 hours  insulin glargine Injectable (LANTUS) 10 Unit(s) SubCutaneous every morning  insulin lispro (ADMELOG) corrective regimen sliding scale   SubCutaneous three times a day before meals  insulin lispro (ADMELOG) corrective regimen sliding scale   SubCutaneous at bedtime  insulin lispro Injectable (ADMELOG) 6 Unit(s) SubCutaneous before breakfast  insulin lispro Injectable (ADMELOG) 6 Unit(s) SubCutaneous before lunch  insulin lispro Injectable (ADMELOG) 6 Unit(s) SubCutaneous before dinner  metoprolol tartrate 25 milliGRAM(s) Oral two times a day  midodrine 2.5 milliGRAM(s) Oral <User Schedule>  phenytoin   Capsule 100 milliGRAM(s) Oral daily  rifAXIMin 550 milliGRAM(s) Oral two times a day  senna 1 Tablet(s) Oral at bedtime  spironolactone 25 milliGRAM(s) Oral daily    MEDICATIONS  (PRN):  albuterol/ipratropium for Nebulization 3 milliLiter(s) Nebulizer every 6 hours PRN Shortness of Breath and/or Wheezing  melatonin 3 milliGRAM(s) Oral at bedtime PRN Insomnia    T(C): 36.4 (08-25-23 @ 12:07), Max: 37.4 (08-24-23 @ 11:50)  HR: 105 (08-25-23 @ 14:30) (83 - 157)  BP: 106/75 (08-25-23 @ 14:30) (92/66 - 120/89)  RR: 18 (08-25-23 @ 14:30)  SpO2: 97% (08-25-23 @ 14:30)  Wt(kg): --  I&O's Detail    24 Aug 2023 07:01  -  25 Aug 2023 07:00  --------------------------------------------------------  IN:    Oral Fluid: 200 mL  Total IN: 200 mL    OUT:  Total OUT: 0 mL    Total NET: 200 mL              PHYSICAL EXAM:  General: No distress  Respiratory: b/l air entry  Cardiovascular: S1 S2  Gastrointestinal: soft  Extremities:  no edema                         LABORATORY:                        14.3   18.89 )-----------( 532      ( 25 Aug 2023 06:28 )             42.2     08-25    124<L>  |  95<L>  |  51<H>  ----------------------------<  161<H>  5.3   |  16<L>  |  2.10<H>    Ca    8.7      25 Aug 2023 06:28  Phos  4.3     08-25  Mg     2.6     08-25    TPro  7.1  /  Alb  1.9<L>  /  TBili  0.6  /  DBili  x   /  AST  54<H>  /  ALT  36  /  AlkPhos  133<H>  08-25    Sodium: 124 mmol/L (08-25 @ 06:28)  Sodium: 122 mmol/L (08-24 @ 16:20)  Sodium: 121 mmol/L (08-24 @ 06:00)    Potassium: 5.3 mmol/L (08-25 @ 06:28)  Potassium: 4.9 mmol/L (08-24 @ 16:20)  Potassium: 4.6 mmol/L (08-24 @ 06:00)  Potassium: 5.2 mmol/L (08-23 @ 16:15)    Hemoglobin: 14.3 g/dL (08-25 @ 06:28)  Hemoglobin: 14.1 g/dL (08-24 @ 06:00)  Hemoglobin: 14.6 g/dL (08-23 @ 06:18)    Creatinine, Serum 2.10 (08-25 @ 06:28)  Creatinine, Serum 1.90 (08-24 @ 16:20)  Creatinine, Serum 1.80 (08-24 @ 06:00)  Creatinine, Serum 1.70 (08-23 @ 06:18)        LIVER FUNCTIONS - ( 25 Aug 2023 06:28 )  Alb: 1.9 g/dL / Pro: 7.1 g/dL / ALK PHOS: 133 U/L / ALT: 36 U/L / AST: 54 U/L / GGT: x           Urinalysis Basic - ( 25 Aug 2023 06:28 )    Color: x / Appearance: x / SG: x / pH: x  Gluc: 161 mg/dL / Ketone: x  / Bili: x / Urobili: x   Blood: x / Protein: x / Nitrite: x   Leuk Esterase: x / RBC: x / WBC x   Sq Epi: x / Non Sq Epi: x / Bacteria: x

## 2023-08-25 NOTE — PROGRESS NOTE ADULT - PROBLEM SELECTOR PLAN 4
On admission patient met sepsis criteria due to hypotension, leukocytosis, tachy and elevated lactate. Unclear source possible SBP  - S/p 2L NS bolus and Rocephin in ED  - Continue Rocephin 2g q24hm to cover SBP as per ID - fluid culture neg   - Lactate 3 on admission --> repeat lactate WNL   - F/u BCx - pending collection  - Trend WBC and monitor for fever  - ID Dr. Allred consulted On admission patient met sepsis criteria due to hypotension, leukocytosis, tachy and elevated lactate. Unclear source possible SBP  - S/p 2L NS bolus and Rocephin in ED  - decrease rocephin 2g -> 1g by ID. Last dose of rocephin today (8/25)  - Lactate 3 on admission --> repeat lactate WNL   - F/u BCx - pending collection  - Trend WBC and monitor for fever  - ID Dr. Allred consulted

## 2023-08-25 NOTE — PROGRESS NOTE ADULT - PROBLEM SELECTOR PLAN 3
Na 122 on admission -   hypervolumic  hyponatremia   - S/p 2L NS bolus in ED  - Na low 122 yesterday --> 125 today however corrected for hyperglycemia / pseudohyponatremia    - Continue PO fluid restriction   - F/u repeat BMP  - Fluid restrict 1.2L daily.  - Started sodium tabs as per nephro    - Nephro Dr. Harper following,     #Hyperkalemia  - Potassium 5.3 today WNL (4.9 yesterday)   - Replete w/ lokelma prn  - Nephro following

## 2023-08-25 NOTE — PROGRESS NOTE ADULT - SUBJECTIVE AND OBJECTIVE BOX
Date/Time Patient Seen:  		  Referring MD:   Data Reviewed	       Patient is a 68y old  Male who presents with a chief complaint of Liver Cirrhosis with ascites, Liver cancer, hyponatremia (24 Aug 2023 15:06)      Subjective/HPI     PAST MEDICAL & SURGICAL HISTORY:  T2DM (type 2 diabetes mellitus)    History of seizure    History of bradycardia    GERD (gastroesophageal reflux disease)    History of cirrhosis of liver    HLD (hyperlipidemia)    HTN (hypertension)    H/O esophageal varices          Medication list         MEDICATIONS  (STANDING):  carvedilol 3.125 milliGRAM(s) Oral every 12 hours  cefTRIAXone   IVPB 2000 milliGRAM(s) IV Intermittent every 24 hours  dextrose 5%. 1000 milliLiter(s) (50 mL/Hr) IV Continuous <Continuous>  dextrose 5%. 1000 milliLiter(s) (100 mL/Hr) IV Continuous <Continuous>  dextrose 50% Injectable 25 Gram(s) IV Push once  dextrose 50% Injectable 12.5 Gram(s) IV Push once  dextrose 50% Injectable 25 Gram(s) IV Push once  glucagon  Injectable 1 milliGRAM(s) IntraMuscular once  heparin   Injectable 5000 Unit(s) SubCutaneous every 12 hours  insulin glargine Injectable (LANTUS) 10 Unit(s) SubCutaneous every morning  insulin lispro (ADMELOG) corrective regimen sliding scale   SubCutaneous at bedtime  insulin lispro (ADMELOG) corrective regimen sliding scale   SubCutaneous three times a day before meals  insulin lispro Injectable (ADMELOG) 6 Unit(s) SubCutaneous before lunch  insulin lispro Injectable (ADMELOG) 6 Unit(s) SubCutaneous before breakfast  insulin lispro Injectable (ADMELOG) 6 Unit(s) SubCutaneous before dinner  phenytoin   Capsule 100 milliGRAM(s) Oral daily  rifAXIMin 550 milliGRAM(s) Oral two times a day  spironolactone 25 milliGRAM(s) Oral daily    MEDICATIONS  (PRN):  albuterol/ipratropium for Nebulization 3 milliLiter(s) Nebulizer every 6 hours PRN Shortness of Breath and/or Wheezing  melatonin 3 milliGRAM(s) Oral at bedtime PRN Insomnia         Vitals log        ICU Vital Signs Last 24 Hrs  T(C): 36.7 (25 Aug 2023 04:16), Max: 37.4 (24 Aug 2023 11:50)  T(F): 98.1 (25 Aug 2023 04:16), Max: 99.4 (24 Aug 2023 11:50)  HR: 114 (25 Aug 2023 04:16) (83 - 157)  BP: 102/69 (25 Aug 2023 04:16) (92/66 - 106/75)  BP(mean): --  ABP: --  ABP(mean): --  RR: 18 (25 Aug 2023 04:16) (17 - 18)  SpO2: 95% (25 Aug 2023 04:16) (94% - 97%)    O2 Parameters below as of 24 Aug 2023 23:33  Patient On (Oxygen Delivery Method): room air                 Input and Output:  I&O's Detail    23 Aug 2023 07:01  -  24 Aug 2023 07:00  --------------------------------------------------------  IN:  Total IN: 0 mL    OUT:    Other (mL): 4750 mL  Total OUT: 4750 mL    Total NET: -4750 mL          Lab Data                        14.1   14.87 )-----------( 520      ( 24 Aug 2023 06:00 )             41.8     08-24    122<L>  |  94<L>  |  42<H>  ----------------------------<  195<H>  4.9   |  20<L>  |  1.90<H>    Ca    8.5      24 Aug 2023 16:20  Phos  4.2     08-24  Mg     2.4     08-24    TPro  7.3  /  Alb  2.0<L>  /  TBili  0.6  /  DBili  x   /  AST  52<H>  /  ALT  35  /  AlkPhos  151<H>  08-24            Review of Systems	      Objective     Physical Examination    heart s1s2  lung dc BS      Pertinent Lab findings & Imaging      Austyn:  NO   Adequate UO     I&O's Detail    23 Aug 2023 07:01  -  24 Aug 2023 07:00  --------------------------------------------------------  IN:  Total IN: 0 mL    OUT:    Other (mL): 4750 mL  Total OUT: 4750 mL    Total NET: -4750 mL               Discussed with:     Cultures:	        Radiology

## 2023-08-25 NOTE — PROGRESS NOTE ADULT - PROBLEM SELECTOR PLAN 1
Chronic, s/p esophageal varices banding in his country [facility]    - Used to drink alcohol years ago   - AST 52 and Alk phos 151; ALT WNL  - CT A/P: Total situs inversus. Cirrhosis with a dominant liver mass concerning for primary HCC versus metastasis. Peritoneal carcinomatosis and moderate volume ascites.  - IR paracentesis on 8/23 - 4750cc of tram ascitic fluid removed from right abdomen  - Ascites fluid analysis: protein 2.1, albumin 0.9, ; PMN leukocytes noted but no organisms  - SAAG > 1.1, likely portal HTN from cirrhosis  - F/u IR peritoneal fluid culture - so far neg   - Hypoalbuminemia at 1.9 - gave albumin IVPB before paracentesis   - Leukocytosis uptrending 18.89 today (14.87 yesterday) likely reactive from post paracentesis. Currently afebrile, will monitor  - Continue rifaximin 550mg BID as per GI  - S/p lasix 20mg IV x2 for ascites (8/23) - SOB resolved  - Continue spironolactone 25mg daily  - Hold hepatotoxic meds  - GI (Dr. Devi) consulted Chronic, s/p esophageal varices banding in his country [facility]    - Used to drink alcohol years ago   - AST 54 and Alk phos 133; ALT WNL  - CT A/P: Total situs inversus. Cirrhosis with a dominant liver mass concerning for primary HCC versus metastasis. Peritoneal carcinomatosis and moderate volume ascites.  - IR paracentesis on 8/23 - 4750cc of tram ascitic fluid removed from right abdomen  - Ascites fluid analysis: protein 2.1, albumin 0.9, ; PMN leukocytes noted but no organisms  - SAAG > 1.1, likely portal HTN from cirrhosis  - F/u IR peritoneal fluid culture - so far neg   - Peritoneal fluid cytopathology: Suspicious for neoplasm. Few atypical cells mixed with lymphocytes, granulocytes, histiocytes and mesothelial cells. Pending immunohistochemical analysis  - Hypoalbuminemia at 1.9 - gave albumin IVPB before paracentesis   - Leukocytosis uptrending 18.89 today (14.87 yesterday) likely reactive from post paracentesis. Currently afebrile, will monitor  - Continue rifaximin 550mg BID as per GI  - S/p lasix 20mg IV x2 for ascites (8/23) - SOB resolved  - Continue spironolactone 25mg daily  - Hold hepatotoxic meds  - GI (Dr. Devi) consulted Chronic, s/p esophageal varices banding in [place] country Located within [facility]    - Used to drink alcohol years ago   - AST 54 and Alk phos 133; ALT WNL on admission. - improving   - CT A/P: Total situs inversus. Cirrhosis with a dominant liver mass concerning for primary HCC versus metastasis. Peritoneal carcinomatosis and moderate volume ascites.  - IR paracentesis on 8/23 - 4750cc of tram ascitic fluid removed from right abdomen  - Ascites fluid analysis: protein 2.1, albumin 0.9, ; PMN leukocytes noted but no organisms  - SAAG > 1.1, likely portal HTN from cirrhosis  - F/u IR peritoneal fluid culture - so far neg   - Peritoneal fluid cytopathology: Suspicious for neoplasm. Few atypical cells mixed with lymphocytes, granulocytes, histiocytes and mesothelial cells. Pending immunohistochemical analysis  - Hypoalbuminemia at 1.9 - gave albumin IVPB before paracentesis   - Leukocytosis uptrending 18.89 today (14.87 yesterday) likely reactive from post paracentesis. Currently afebrile, will monitor  - Continue rifaximin 550mg BID as per GI  - S/p lasix 20mg IV x2 for ascites (8/23) - SOB resolved  - Continue spironolactone 25mg daily  - Hold hepatotoxic meds  - GI (Dr. Devi) consulted Chronic, s/p esophageal varices banding in his country [facility]    - Used to drink alcohol years ago   - AST 54 and Alk phos 133; ALT WNL on admission. - improving   - CT A/P: Total situs inversus. Cirrhosis with a dominant liver mass concerning for primary HCC versus metastasis. Peritoneal carcinomatosis and moderate volume ascites.  - IR paracentesis on 8/23 - 4750cc of tram ascitic fluid removed from right abdomen  - Ascites fluid analysis: protein 2.1, albumin 0.9, ; PMN leukocytes noted but no organisms  - SAAG > 1.1, could be portal HTN from cirrhosis, but still suspicious of mets.   - F/u IR peritoneal fluid culture - so far neg   - Peritoneal fluid cytopathology: Suspicious for neoplasm. Few atypical cells mixed with lymphocytes, granulocytes, histiocytes and mesothelial cells. Pending immunohistochemical analysis  - Hypoalbuminemia at 1.9 - gave albumin IVPB before paracentesis   - Leukocytosis uptrending 18.89 today (14.87 yesterday) likely reactive from post paracentesis. Currently afebrile, will monitor  - Continue rifaximin 550mg BID as per GI  - S/p lasix 20mg IV x2 for ascites (8/23) - SOB resolved  - Continue spironolactone 25mg daily  - Hold hepatotoxic meds  - GI (Dr. Devi) consulted

## 2023-08-25 NOTE — PROGRESS NOTE ADULT - PROBLEM SELECTOR PLAN 5
MAIA likely in setting of dehydration (baseline Cr unknown)  - S/p 2L NS bolus in ED  - S/p lasix 20mg IV x2 for ascites (8/23) during hospital course  - Cr 1.9 --> 2.1 today likely prerenal azotemia 2/2 post IV Lasix   - Continue spironolactone as per above  - Hold further fluids for now  - Monitor BMP  - Avoid nephrotoxic medications: hold home torsemide and Ramipril  - Monitor renal indices  - Nephro (Dr. Harper) MAIA likely in setting of dehydration (baseline Cr unknown)  - S/p 2L NS bolus in ED  - S/p lasix 20mg IV x2 for ascites (8/23) during hospital course  - Cr 1.9 --> 2.1 today likely prerenal azotemia 2/2 post IV Lasix   - Continue spironolactone as per above  - Hold further fluids for now  - Monitor BMP  - Avoid nephrotoxic medications: hold home torsemide and ramipril  - Monitor renal indices  - Nephro (Dr. Harper) MAIA likely in setting of dehydration on ckd3   - S/p 2L NS bolus in ED  - S/p lasix 20mg IV x2 for ascites (8/23) during hospital course  - Cr 1.9 --> 2.1 today likely prerenal azotemia 2/2 post IV Lasix   - Continue spironolactone as per above  - Hold further fluids for now  - Monitor BMP  - Avoid nephrotoxic medications: hold home torsemide and ramipril  - Monitor renal indices  - Nephro (Dr. Harper)

## 2023-08-25 NOTE — PROGRESS NOTE ADULT - SUBJECTIVE AND OBJECTIVE BOX
[facility]   INFECTIOUS DISEASES   [address] [place] [address]  Tel: [phone]     Fax: [phone]  =========================================================  MD Marco Weber Kaushal, MD Cho, Michelle, MD Sunjit, Jaspal, MD  =========================================================    HERMANN XAVIER [id number]    Follow up: SBP? carcinomatosis?     After paracentesis feels better, no fever. No abdominal pain any more.     Allergies:  No Known Allergies    Antibiotics:  albuterol/ipratropium for Nebulization 3 milliLiter(s) Nebulizer every 6 hours PRN  carvedilol 3.125 milliGRAM(s) Oral every 12 hours  cefTRIAXone   IVPB 2000 milliGRAM(s) IV Intermittent every 24 hours  dextrose 5%. 1000 milliLiter(s) IV Continuous <Continuous>  dextrose 5%. 1000 milliLiter(s) IV Continuous <Continuous>  dextrose 50% Injectable 25 Gram(s) IV Push once  dextrose 50% Injectable 12.5 Gram(s) IV Push once  dextrose 50% Injectable 25 Gram(s) IV Push once  glucagon  Injectable 1 milliGRAM(s) IntraMuscular once  heparin   Injectable 5000 Unit(s) SubCutaneous every 12 hours  insulin glargine Injectable (LANTUS) 10 Unit(s) SubCutaneous every morning  insulin lispro (ADMELOG) corrective regimen sliding scale   SubCutaneous three times a day before meals  insulin lispro (ADMELOG) corrective regimen sliding scale   SubCutaneous at bedtime  insulin lispro Injectable (ADMELOG) 6 Unit(s) SubCutaneous before lunch  insulin lispro Injectable (ADMELOG) 6 Unit(s) SubCutaneous before breakfast  insulin lispro Injectable (ADMELOG) 6 Unit(s) SubCutaneous before dinner  melatonin 3 milliGRAM(s) Oral at bedtime PRN  phenytoin   Capsule 100 milliGRAM(s) Oral daily  rifAXIMin 550 milliGRAM(s) Oral two times a day  spironolactone 25 milliGRAM(s) Oral daily    REVIEW OF SYSTEMS:  CONSTITUTIONAL:  No Fever or chills  HEENT:   No diplopia or blurred vision.  No earache, sore throat or runny nose.  CARDIOVASCULAR:  No chest pain or SOB  RESPIRATORY:  No cough, shortness of breath, PND or orthopnea.  GASTROINTESTINAL:  No nausea, vomiting or diarrhea.  GENITOURINARY:  No dysuria, frequency or urgency. No Blood in urine  MUSCULOSKELETAL:  no joint aches, no muscle pain  SKIN:  No change in skin, hair or nails.  NEUROLOGIC:  No paresthesias or weakness.  PSYCHIATRIC:  No disorder of thought or mood.  ENDOCRINE:  No heat or cold intolerance, polyuria or polydipsia.  HEMATOLOGICAL:  No easy bruising or bleeding.      Physical Exam:  Vital Signs Last 24 Hrs  T(C): 36.4 (25 Aug 2023 12:07), Max: 37.1 (24 Aug 2023 23:33)  T(F): 97.6 (25 Aug 2023 12:07), Max: 98.7 (24 Aug 2023 23:33)  HR: 102 (25 Aug 2023 12:07) (83 - 157)  BP: 120/89 (25 Aug 2023 12:07) (92/66 - 120/89)  BP(mean): --  RR: 18 (25 Aug 2023 12:07) (17 - 18)  SpO2: 94% (25 Aug 2023 12:07) (94% - 97%)  Parameters below as of 25 Aug 2023 12:07  Patient On (Oxygen Delivery Method): room air  GEN: NAD  HEENT: normocephalic and atraumatic. EOMI. MANJINDER.    NECK: Supple.  No lymphadenopathy   LUNGS: Clear to auscultation.  HEART: Regular rate and rhythm without murmur.  ABDOMEN: Distended and firm, but improved from yesterday s/p IR paracentesis    : No CVA tenderness  EXTREMITIES: Without edema.  MSK: no joint swelling  NEUROLOGIC: grossly intact.  PSYCHIATRIC: Appropriate affect .  SKIN: No rash     Labs:                        14.3   18.89 )-----------( 532      ( 25 Aug 2023 06:28 )             42.2     08-25    124<L>  |  95<L>  |  51<H>  ----------------------------<  161<H>  5.3   |  16<L>  |  2.10<H>    Ca    8.7      25 Aug 2023 06:28  Phos  4.3     08-25  Mg     2.6     08-25    TPro  7.1  /  Alb  1.9<L>  /  TBili  0.6  /  DBili  x   /  AST  54<H>  /  ALT  36  /  AlkPhos  133<H>  08-25    Culture - Body Fluid with Gram Stain (collected 08-23-23 @ 12:35)  Source: Ascites Fl Ascites Fluid  Gram Stain (08-23-23 @ 23:12):    polymorphonuclear leukocytes seen    No organisms seen    by cytocentrifuge    Culture - Body Fluid with Gram Stain (collected 08-23-23 @ 09:50)  Source: .Body Fluid Other, Peritoneal Fluid  Gram Stain (08-24-23 @ 02:43):    polymorphonuclear leukocytes seen    No organisms seen    by cytocentrifuge    WBC Count: 18.89 K/uL (08-25-23 @ 06:28)  WBC Count: 14.87 K/uL (08-24-23 @ 06:00)  WBC Count: 12.47 K/uL (08-23-23 @ 06:18)  WBC Count: 14.06 K/uL (08-22-23 @ 07:33)  WBC Count: 14.02 K/uL (08-21-23 @ 17:00)    Creatinine: 2.10 mg/dL (08-25-23 @ 06:28)  Creatinine: 1.90 mg/dL (08-24-23 @ 16:20)  Creatinine: 1.80 mg/dL (08-24-23 @ 06:00)  Creatinine: 1.70 mg/dL (08-23-23 @ 06:18)  Creatinine: 1.40 mg/dL (08-22-23 @ 07:33)  Creatinine: 1.50 mg/dL (08-22-23 @ 03:37)  Creatinine: 1.80 mg/dL (08-21-23 @ 17:00)     SARS-CoV-2 Result: NotDetec (08-21-23 @ 17:00)    All imaging and data are reviewed.   IR Paracentesis 08/23/23  INTERPRETATION:  History: Ascites.  Procedure: The procedure, risks, benefits, and alternatives were   discussed with the patient in the presence of an Interventional Radiology   nurse and informed consent was placed in the chart.  The patient was placed in the supine position on the angio suite bed and   a time out was performed.  The patient's right lower quadrant was prepped and draped in a normal   sterile fashion. Under ultrasound guidance, a 8.5 [place] multiside hole   catheter was advanced into the ascites.  4750 cc of tram-colored fluid was aspirated.  The catheter was removed and Dermabond was applied.  The patient  tolerated the procedure without complication and left the   suite in stable condition.  Findings:  Ultrasound demonstrates ascites in the right lower quadrant.  Impression:  SUCCESSFUL MODERATE VOLUME PARACENTESIS AS DESCRIBED.  CT Ab/ Pelvis 08/21/23  FINDINGS:  LOWER CHEST: Total situs inversus.  LIVER: Cirrhosis. A segment 8/4 A exophytic peripherally enhancing   low-density mass extends superiorly to the diaphragm, measuring   approximately 5.2 x 4.1 cm. Additional 5 cm cyst low-density lesions that   are too small to characterize.  BILE DUCTS: Normal caliber.  GALLBLADDER: Within normal limits.  SPLEEN: Within normal limits.  PANCREAS: Within normal limits.  ADRENALS: Within normal limits.  KIDNEYS/URETERS: Within normal limits.  BLADDER: Within normal limits.  REPRODUCTIVE ORGANS: Mildly prominent prostate gland.  BOWEL: No bowel obstruction.  PERITONEUM: Moderate volume ascites with soft tissue implants diffusely   along the peritoneum as well as the serosal surface of the sigmoid colon.   A perisigmoid implant measures approximately 3.9 x 2.8 cm in greatest   transaxial dimension. Soft tissue implants are noted along the falciform   ligament. Omental nodularity is present.  VESSELS: Patent portal, superior mesenteric, and splenic veins. Diffuse   abdominal collaterals..  RETROPERITONEUM/LYMPH NODES: No lymphadenopathy.  ABDOMINAL WALL: Left inguinal hernia containing fluid and fat.  BONES: Degenerative changes.  IMPRESSION: Total situs inversus.  Cirrhosis with a dominant liver mass concerning for primary HCC versus   metastasis.  Peritoneal carcinomatosis and moderate volume ascites.    Assessment and Plan: Patient is a 67yo M with a PMH of liver cirrhosis s/p esophageal varices banding, situs inversus, HF?, bradycardia, HTN, HLD, T2DM, seizure, GERD who was admitted for for liver cirrhosis with ascites concerning for HCC, complicated with sepsis possible secondary to SBP and electrolyte imbalance/hyponatremia. 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 Patient no longer has LUQ pain. CT ab/pelvis showed cirrhosis with a dominant liver mass concerning for primary HCC versus metastasis, and peritoneal carcinomatosis and moderate volume ascites. IR paracentesis showed 4750 cc of tram-colored fluid was aspirated. Patient's abdomen was still slightly distended and firm, but improved. Patient is afebrile. Denies chills, headache, lightheadedness, palpitations, dyspnea, nausea, vomiting, diarrhea/constipation.     - Ascites fluid most likely Transudative   - Continue IV Ceftriaxone 1g q24, last day today  - Continue Rifaximin 550mg BID   - Ascites fluid culture negative   - UA significant for total protein 24, glucose 250 and small amount of bilirubin   - Hep C negative    - Trend WBC --> elevated possibly reactive post paracentesis   - Trend fevers --> currently afebrile  - Most likely metastatic cancer causing liver mass and peritoneal carcinomatosis, needs  more work up.     Will follow PRN. Please call with any question.     Krystle Allred MD  Division of Infectious Diseases   Please call ID service at [phone] with any question.      35 minutes spent on total encounter assessing patient, examination, chart review, counseling and coordinating care by the attending physician/nurse/care manager.

## 2023-08-25 NOTE — PROGRESS NOTE ADULT - ASSESSMENT
68y old male who presents with a chief complaint of Liver Cirrhosis with ascites, Liver cancer, and hyponatremia.     CHF, situs inversus, Cirrhosis   - Pt with known hx  HFrEF per family at bedside, does not have any providers here as he resides in [facility]   - with presentation for weakness, hyponatremia, liver cirrhosis with ascites, with concern for malignancy/HCC  - s/p paracentesis draining almost 5 L  - GI following     - Family reports LVEF of 25-30% in 2020. Results of previous TTE not available.   - S/p Lasix 40 mg IVP x 1 8/24  - Creat 2.1. up trended. Would hold off further diuresis today.   - He is on Spironolactone. Can hold given hyponatremia   - Sodium trend: <--124, <--122, <--121  - on home digoxin 0.125mg M-F for HF, now being held. Continue holding 2/2 MAIA; digoxin level : 0.8 (8/22)   - Telemetry - 110's baseline   - BP on low side 90-110s   - Continue Midodrine.   - He is on Coreg, but doses being held for hypotension.  - Would switch to Metoprolol for the time being given borderline low Bp   - home ACEi on hold d/t MAIA, renal following   - f/u TTE    - No clear evidence of acute ischemia.  - Repeat EKG with leads in positions appropriate for situs inversus.  - Hold home medication atorvastatin 10mg in the setting of liver cirrhosis    - Monitor and replete lytes, keep K>4, Mg>2.  - Will continue to follow.    Sg Brower, MS FNP, AGACNP  Nurse Practitioner- Cardiology   Please call on TEAMS   68y old male who presents with a chief complaint of Liver Cirrhosis with ascites, Liver cancer, and hyponatremia.     CHF, situs inversus, Cirrhosis   - Pt with known hx  HFrEF per family at bedside, does not have any providers here as he resides in [facility]   - with presentation for weakness, hyponatremia, liver cirrhosis with ascites, with concern for malignancy/HCC  - s/p paracentesis draining almost 5 L  - GI following     - Family reports LVEF of 25-30% in 2020. Results of previous TTE not available.   - S/p Lasix 40 mg IVP x 1 8/24  - Creat 2.1. up trended. Would hold off further diuresis today.   - He is on Spironolactone. Would continue for now given liver issues   - Sodium trend: <--124, <--122, <--121  - on home digoxin 0.125mg M-F for HF, now being held. Continue holding 2/2 MAIA; digoxin level : 0.8 (8/22)   - Telemetry - 110's baseline   - BP on low side 90-110s   - Continue Midodrine.   - He is on Coreg, but doses being held for hypotension.  - Would switch to Metoprolol for the time being given borderline low Bp   - home ACEi on hold d/t MAIA, renal following   - f/u TTE    - No clear evidence of acute ischemia.  - Repeat EKG with leads in positions appropriate for situs inversus.  - Hold home medication atorvastatin 10mg in the setting of liver cirrhosis    - Monitor and replete lytes, keep K>4, Mg>2.  - Will continue to follow.    Sg Brower, MS FNP, AGACNP  Nurse Practitioner- Cardiology   Please call on TEAMS

## 2023-08-25 NOTE — PROGRESS NOTE ADULT - SUBJECTIVE AND OBJECTIVE BOX
Patient is a 68y old  Male who presents with a chief complaint of Liver Cirrhosis with ascites, Liver cancer, hyponatremia (25 Aug 2023 11:00)      Subjective:  INTERVAL HPI/OVERNIGHT EVENTS: Patient seen and examined at bedside. Patient is Mele speaking,  ID [id number]. Patient was tachycardic to the 150s, peaking at 170 for about 10 min. He was resting in bed at the time of the event and denied all associated symptoms including chest pain, palpitations, dizziness, and SOB. Of note, patient missed PM Coreg yesterday due to low /75. Patient is now complaining of intermittent palpitations. Patient reports that the LUQ abdominal pain and L sided chest/rib pain are improved from yesterday. He has not had a BM for the past 2 days. Patient has associated lack of appetite with no nausea or vomiting. He also endorses SOB with no cough that started yesterday night. Patient is now off supplemental O2, currently saturating at 95 on RA. Denies fevers, chills, headache, lightheadedness, diarrhea.          REVIEW OF SYSTEMS:  CONSTITUTIONAL: (+) Lack of appetite. No fever or chills  HEENT:  No headache, no sore throat  RESPIRATORY: (+) Shortness of breath. No cough or wheezing  CARDIOVASCULAR: (+) L sided lower chest/rib pain and palpitations  GASTROINTESTINAL: (+) LUQ abdominal pain (improving after paracentesis) and constipation. No nausea, vomiting, or diarrhea  GENITOURINARY: No dysuria, frequency, or hematuria  NEUROLOGICAL: (+) Generalized weakness. No dizziness  MUSCULOSKELETAL: no myalgias         Objective:  Vital Signs Last 24 Hrs  T(C): 36.7 (25 Aug 2023 04:16), Max: 37.4 (24 Aug 2023 11:50)  T(F): 98.1 (25 Aug 2023 04:16), Max: 99.4 (24 Aug 2023 11:50)  HR: 106 (25 Aug 2023 10:23) (83 - 157)  BP: 119/78 (25 Aug 2023 10:23) (92/66 - 119/78)  BP(mean): --  RR: 18 (25 Aug 2023 04:16) (17 - 18)  SpO2: 95% (25 Aug 2023 04:16) (94% - 97%)    Parameters below as of 24 Aug 2023 23:33  Patient On (Oxygen Delivery Method): room air        GENERAL: NAD, lying in bed with increased work of breathing  HEAD:  Atraumatic, Normocephalic  EYES: EOMI, PERRLA, conjunctiva and sclera clear  ENT: Dry oral mucous   NECK: Supple, No JVD  CHEST/LUNG: (+) Tenderness to palpation in L lower chest/rib region. CTA b/l. No rales, rhonchi, or wheezing.   HEART: S1,S2. Regular rhythm. (+) Tachy. No murmurs  ABDOMEN: BSx4. Soft, distended (improved after paracentesis). (+) Tenderness to palpation in LUQ region. No rebound or guarding  EXTREMITIES: No clubbing, cyanosis, or edema  NERVOUS SYSTEM:  Alert & Oriented X3, speech clear, sensory/motor intact b/l        LABS:                        14.3   18.89 )-----------( 532      ( 25 Aug 2023 06:28 )             42.2     CBC Full  -  ( 25 Aug 2023 06:28 )  WBC Count : 18.89 K/uL  Hemoglobin : 14.3 g/dL  Hematocrit : 42.2 %  Platelet Count - Automated : 532 K/uL  Mean Cell Volume : 80.5 fl  Mean Cell Hemoglobin : 27.3 pg  Mean Cell Hemoglobin Concentration : 33.9 gm/dL  Auto Neutrophil # : 13.60 K/uL  Auto Lymphocyte # : 2.46 K/uL  Auto Monocyte # : 2.46 K/uL  Auto Eosinophil # : 0.38 K/uL  Auto Basophil # : 0.00 K/uL  Auto Neutrophil % : 72.0 %  Auto Lymphocyte % : 13.0 %  Auto Monocyte % : 13.0 %  Auto Eosinophil % : 2.0 %  Auto Basophil % : 0.0 %    25 Aug 2023 06:28    124    |  95     |  51     ----------------------------<  161    5.3     |  16     |  2.10     Ca    8.7        25 Aug 2023 06:28  Phos  4.3       25 Aug 2023 06:28  Mg     2.6       25 Aug 2023 06:28    TPro  7.1    /  Alb  1.9    /  TBili  0.6    /  DBili  x      /  AST  54     /  ALT  36     /  AlkPhos  133    25 Aug 2023 06:28      Urinalysis Basic - ( 25 Aug 2023 06:28 )    Color: x / Appearance: x / SG: x / pH: x  Gluc: 161 mg/dL / Ketone: x  / Bili: x / Urobili: x   Blood: x / Protein: x / Nitrite: x   Leuk Esterase: x / RBC: x / WBC x   Sq Epi: x / Non Sq Epi: x / Bacteria: x      CAPILLARY BLOOD GLUCOSE      POCT Blood Glucose.: 155 mg/dL (25 Aug 2023 07:50)  POCT Blood Glucose.: 216 mg/dL (24 Aug 2023 21:12)  POCT Blood Glucose.: 198 mg/dL (24 Aug 2023 16:44)        Culture - Body Fluid with Gram Stain (collected 08-23-23 @ 12:35)  Source: Ascites Fl Ascites Fluid  Gram Stain (08-23-23 @ 23:12):    polymorphonuclear leukocytes seen    No organisms seen    by cytocentrifuge  Preliminary Report (08-24-23 @ 15:38):    No growth    Culture - Body Fluid with Gram Stain (collected 08-23-23 @ 09:50)  Source: .Body Fluid Other, Peritoneal Fluid  Gram Stain (08-24-23 @ 02:43):    polymorphonuclear leukocytes seen    No organisms seen    by cytocentrifuge  Preliminary Report (08-25-23 @ 10:31):    No growth        RADIOLOGY & ADDITIONAL TESTS:    Personally reviewed.     Consultant(s) Notes Reviewed:  [x] YES  [ ] NO     Patient is a 68y old  Male who presents with a chief complaint of Liver Cirrhosis with ascites, Liver cancer, hyponatremia (25 Aug 2023 11:00)      Subjective:  INTERVAL HPI/OVERNIGHT EVENTS: Patient seen and examined at bedside. Patient is Mele speaking,  ID [id number]. Patient was tachycardic to the 150s, peaking at 170 for about 10 min. He was resting in bed at the time of the event and denied all associated symptoms including chest pain, palpitations, dizziness, and SOB. Of note, patient missed PM Coreg yesterday due to low /75. Patient is now complaining of intermittent palpitations. Patient reports that the LUQ abdominal pain and L sided chest/rib pain are improved from yesterday. He has not had a BM for the past 2 days. Patient has associated lack of appetite with no nausea or vomiting. He also endorses SOB with no cough that started yesterday night. Patient is now off supplemental O2, currently saturating at 95% on RA. Denies fevers, chills, headache, lightheadedness, diarrhea.          REVIEW OF SYSTEMS:  CONSTITUTIONAL: (+) Lack of appetite. No fever or chills  HEENT:  No headache, no sore throat  RESPIRATORY: (+) Shortness of breath. No cough or wheezing  CARDIOVASCULAR: (+) L sided lower chest/rib pain and palpitations  GASTROINTESTINAL: (+) LUQ abdominal pain (improving after paracentesis) and constipation. No nausea, vomiting, or diarrhea  GENITOURINARY: No dysuria, frequency, or hematuria  NEUROLOGICAL: (+) Generalized weakness. No dizziness  MUSCULOSKELETAL: no myalgias         Objective:  Vital Signs Last 24 Hrs  T(C): 36.7 (25 Aug 2023 04:16), Max: 37.4 (24 Aug 2023 11:50)  T(F): 98.1 (25 Aug 2023 04:16), Max: 99.4 (24 Aug 2023 11:50)  HR: 106 (25 Aug 2023 10:23) (83 - 157)  BP: 119/78 (25 Aug 2023 10:23) (92/66 - 119/78)  BP(mean): --  RR: 18 (25 Aug 2023 04:16) (17 - 18)  SpO2: 95% (25 Aug 2023 04:16) (94% - 97%)    Parameters below as of 24 Aug 2023 23:33  Patient On (Oxygen Delivery Method): room air        GENERAL: NAD, lying in bed with increased work of breathing  HEAD:  Atraumatic, Normocephalic  EYES: EOMI, PERRLA, conjunctiva and sclera clear  ENT: Dry oral mucous   NECK: Supple, No JVD  CHEST/LUNG: (+) Tenderness to palpation in L lower chest/rib region. CTA b/l. No rales, rhonchi, or wheezing.   HEART: S1,S2. Regular rhythm. (+) Tachy. No murmurs  ABDOMEN: BSx4. Soft, distended (improved after paracentesis). (+) Tenderness to palpation in LUQ region. No rebound or guarding  EXTREMITIES: No clubbing, cyanosis, or edema  NERVOUS SYSTEM:  Alert & Oriented X3, speech clear, sensory/motor intact b/l        LABS:                        14.3   18.89 )-----------( 532      ( 25 Aug 2023 06:28 )             42.2     CBC Full  -  ( 25 Aug 2023 06:28 )  WBC Count : 18.89 K/uL  Hemoglobin : 14.3 g/dL  Hematocrit : 42.2 %  Platelet Count - Automated : 532 K/uL  Mean Cell Volume : 80.5 fl  Mean Cell Hemoglobin : 27.3 pg  Mean Cell Hemoglobin Concentration : 33.9 gm/dL  Auto Neutrophil # : 13.60 K/uL  Auto Lymphocyte # : 2.46 K/uL  Auto Monocyte # : 2.46 K/uL  Auto Eosinophil # : 0.38 K/uL  Auto Basophil # : 0.00 K/uL  Auto Neutrophil % : 72.0 %  Auto Lymphocyte % : 13.0 %  Auto Monocyte % : 13.0 %  Auto Eosinophil % : 2.0 %  Auto Basophil % : 0.0 %    25 Aug 2023 06:28    124    |  95     |  51     ----------------------------<  161    5.3     |  16     |  2.10     Ca    8.7        25 Aug 2023 06:28  Phos  4.3       25 Aug 2023 06:28  Mg     2.6       25 Aug 2023 06:28    TPro  7.1    /  Alb  1.9    /  TBili  0.6    /  DBili  x      /  AST  54     /  ALT  36     /  AlkPhos  133    25 Aug 2023 06:28      Urinalysis Basic - ( 25 Aug 2023 06:28 )    Color: x / Appearance: x / SG: x / pH: x  Gluc: 161 mg/dL / Ketone: x  / Bili: x / Urobili: x   Blood: x / Protein: x / Nitrite: x   Leuk Esterase: x / RBC: x / WBC x   Sq Epi: x / Non Sq Epi: x / Bacteria: x      CAPILLARY BLOOD GLUCOSE      POCT Blood Glucose.: 155 mg/dL (25 Aug 2023 07:50)  POCT Blood Glucose.: 216 mg/dL (24 Aug 2023 21:12)  POCT Blood Glucose.: 198 mg/dL (24 Aug 2023 16:44)        Culture - Body Fluid with Gram Stain (collected 08-23-23 @ 12:35)  Source: Ascites Fl Ascites Fluid  Gram Stain (08-23-23 @ 23:12):    polymorphonuclear leukocytes seen    No organisms seen    by cytocentrifuge  Preliminary Report (08-24-23 @ 15:38):    No growth    Culture - Body Fluid with Gram Stain (collected 08-23-23 @ 09:50)  Source: .Body Fluid Other, Peritoneal Fluid  Gram Stain (08-24-23 @ 02:43):    polymorphonuclear leukocytes seen    No organisms seen    by cytocentrifuge  Preliminary Report (08-25-23 @ 10:31):    No growth        RADIOLOGY & ADDITIONAL TESTS:    Personally reviewed.     Consultant(s) Notes Reviewed:  [x] YES  [ ] NO     Patient is a 68y old  Male who presents with a chief complaint of Liver Cirrhosis with ascites, Liver cancer, hyponatremia (25 Aug 2023 11:00)      Subjective:  INTERVAL HPI/OVERNIGHT EVENTS: Patient seen and examined at bedside. Patient is Mele speaking,  ID [id number]. Patient was tachycardic to the 150s, peaking at 170 for about 10 min. He was resting in bed at the time of the event and denied all associated symptoms including chest pain, palpitations, dizziness, and SOB. Of note, patient missed PM Coreg yesterday due to low /75. Patient is now complaining of intermittent palpitations. Patient reports that the LUQ abdominal pain and L sided chest/rib pain are improved from yesterday. He has not had a BM for the past 2 days. Patient has associated lack of appetite with no nausea or vomiting. He also endorses SOB with no cough that started yesterday night. Patient is now off supplemental O2, currently saturating at 95% on RA. Denies fevers, chills, headache, lightheadedness, diarrhea.          REVIEW OF SYSTEMS:  CONSTITUTIONAL: (+) Lack of appetite. No fever or chills  HEENT:  No headache, no sore throat  RESPIRATORY: (+) Shortness of breath. No cough or wheezing  CARDIOVASCULAR: (+) L sided lower chest/rib pain and palpitations  GASTROINTESTINAL: (+) LUQ abdominal pain (improving after paracentesis) and constipation. No nausea, vomiting, or diarrhea  GENITOURINARY: No dysuria, frequency, or hematuria  NEUROLOGICAL: (+) Generalized weakness. No dizziness  MUSCULOSKELETAL: no myalgias         Objective:  Vital Signs Last 24 Hrs  T(C): 36.7 (25 Aug 2023 04:16), Max: 37.4 (24 Aug 2023 11:50)  T(F): 98.1 (25 Aug 2023 04:16), Max: 99.4 (24 Aug 2023 11:50)  HR: 106 (25 Aug 2023 10:23) (83 - 157)  BP: 119/78 (25 Aug 2023 10:23) (92/66 - 119/78)  BP(mean): --  RR: 18 (25 Aug 2023 04:16) (17 - 18)  SpO2: 95% (25 Aug 2023 04:16) (94% - 97%)    Parameters below as of 24 Aug 2023 23:33  Patient On (Oxygen Delivery Method): room air    GENERAL: NAD, lying in bed with increased work of breathing  HEAD:  Atraumatic, Normocephalic  EYES: EOMI, PERRLA, conjunctiva and sclera clear  ENT: Dry oral mucous   NECK: Supple, No JVD  CHEST/LUNG: (+) Tenderness to palpation in L lower chest/rib region. CTA b/l. No rales, rhonchi, or wheezing.   HEART: S1,S2. Regular rhythm. (+) Tachy. No murmurs  ABDOMEN: BSx4. Soft, distended (improved after paracentesis). (+) Tenderness to palpation in LUQ region - improving. No rebound or guarding  EXTREMITIES: No clubbing, cyanosis, or edema  NERVOUS SYSTEM:  Alert & Oriented X3, speech clear, sensory/motor intact b/l        LABS:                        14.3   18.89 )-----------( 532      ( 25 Aug 2023 06:28 )             42.2     CBC Full  -  ( 25 Aug 2023 06:28 )  WBC Count : 18.89 K/uL  Hemoglobin : 14.3 g/dL  Hematocrit : 42.2 %  Platelet Count - Automated : 532 K/uL  Mean Cell Volume : 80.5 fl  Mean Cell Hemoglobin : 27.3 pg  Mean Cell Hemoglobin Concentration : 33.9 gm/dL  Auto Neutrophil # : 13.60 K/uL  Auto Lymphocyte # : 2.46 K/uL  Auto Monocyte # : 2.46 K/uL  Auto Eosinophil # : 0.38 K/uL  Auto Basophil # : 0.00 K/uL  Auto Neutrophil % : 72.0 %  Auto Lymphocyte % : 13.0 %  Auto Monocyte % : 13.0 %  Auto Eosinophil % : 2.0 %  Auto Basophil % : 0.0 %    25 Aug 2023 06:28    124    |  95     |  51     ----------------------------<  161    5.3     |  16     |  2.10     Ca    8.7        25 Aug 2023 06:28  Phos  4.3       25 Aug 2023 06:28  Mg     2.6       25 Aug 2023 06:28    TPro  7.1    /  Alb  1.9    /  TBili  0.6    /  DBili  x      /  AST  54     /  ALT  36     /  AlkPhos  133    25 Aug 2023 06:28      Urinalysis Basic - ( 25 Aug 2023 06:28 )    Color: x / Appearance: x / SG: x / pH: x  Gluc: 161 mg/dL / Ketone: x  / Bili: x / Urobili: x   Blood: x / Protein: x / Nitrite: x   Leuk Esterase: x / RBC: x / WBC x   Sq Epi: x / Non Sq Epi: x / Bacteria: x      CAPILLARY BLOOD GLUCOSE      POCT Blood Glucose.: 155 mg/dL (25 Aug 2023 07:50)  POCT Blood Glucose.: 216 mg/dL (24 Aug 2023 21:12)  POCT Blood Glucose.: 198 mg/dL (24 Aug 2023 16:44)        Culture - Body Fluid with Gram Stain (collected 08-23-23 @ 12:35)  Source: Ascites Fl Ascites Fluid  Gram Stain (08-23-23 @ 23:12):    polymorphonuclear leukocytes seen    No organisms seen    by cytocentrifuge  Preliminary Report (08-24-23 @ 15:38):    No growth    Culture - Body Fluid with Gram Stain (collected 08-23-23 @ 09:50)  Source: .Body Fluid Other, Peritoneal Fluid  Gram Stain (08-24-23 @ 02:43):    polymorphonuclear leukocytes seen    No organisms seen    by cytocentrifuge  Preliminary Report (08-25-23 @ 10:31):    No growth        RADIOLOGY & ADDITIONAL TESTS:  None.  Personally reviewed.     Consultant(s) Notes Reviewed:  [x] YES  [ ] NO     Patient is a 68y old  Male who presents with a chief complaint of Liver Cirrhosis with ascites, Liver cancer, hyponatremia (25 Aug 2023 11:00)      Subjective:  INTERVAL HPI/OVERNIGHT EVENTS: Patient seen and examined at bedside. Patient is Mele speaking,  ID [id number]. Patient was tachycardic to the 150s, peaking at 170 for about 10 min. He was resting in bed at the time of the event and denied all associated symptoms including chest pain, palpitations, dizziness, and SOB. Of note, patient missed PM Coreg yesterday due to low /75. Patient is now complaining of intermittent palpitations. Patient reports that the LUQ abdominal pain and L sided chest/rib pain are improved from yesterday. He has not had a BM for the past 2 days. Patient has associated lack of appetite with no nausea or vomiting. He also endorses SOB with no cough that started yesterday night. Patient is now off supplemental O2, currently saturating at 95% on RA. Denies fevers, chills, headache, lightheadedness, diarrhea.          REVIEW OF SYSTEMS:  CONSTITUTIONAL: (+) Lack of appetite. No fever or chills  HEENT:  No headache, no sore throat  RESPIRATORY: (+) Shortness of breath. No cough or wheezing  CARDIOVASCULAR: (+) L sided lower chest/rib pain and palpitations  GASTROINTESTINAL: (+) LUQ abdominal pain (improving after paracentesis) and constipation. No nausea, vomiting, or diarrhea  GENITOURINARY: No dysuria, frequency, or hematuria  NEUROLOGICAL: (+) Generalized weakness. No dizziness  MUSCULOSKELETAL: no myalgias         Objective:  Vital Signs Last 24 Hrs  T(C): 36.7 (25 Aug 2023 04:16), Max: 37.4 (24 Aug 2023 11:50)  T(F): 98.1 (25 Aug 2023 04:16), Max: 99.4 (24 Aug 2023 11:50)  HR: 106 (25 Aug 2023 10:23) (83 - 157)  BP: 119/78 (25 Aug 2023 10:23) (92/66 - 119/78)  BP(mean): --  RR: 18 (25 Aug 2023 04:16) (17 - 18)  SpO2: 95% (25 Aug 2023 04:16) (94% - 97%)    Parameters below as of 24 Aug 2023 23:33  Patient On (Oxygen Delivery Method): room air        GENERAL: NAD, lying in bed with increased work of breathing  HEAD:  Atraumatic, Normocephalic  EYES: EOMI, PERRLA, conjunctiva and sclera clear  ENT: Dry oral mucous   NECK: Supple, No JVD  CHEST/LUNG: (+) Tenderness to palpation in Lt lower chest/rib region. CTA b/l. No rales, rhonchi, or wheezing.   HEART: S1,S2. Regular rhythm. (+) Tachy. No murmurs  ABDOMEN: BSx4. Soft, distended (improved after paracentesis). (+) Tenderness to palpation in LUQ region. No rebound or guarding  EXTREMITIES: No clubbing, cyanosis, or edema  NERVOUS SYSTEM:  Alert & Oriented X3, speech clear, sensory/motor intact b/l  gu intact       LABS:                        14.3   18.89 )-----------( 532      ( 25 Aug 2023 06:28 )             42.2     CBC Full  -  ( 25 Aug 2023 06:28 )  WBC Count : 18.89 K/uL  Hemoglobin : 14.3 g/dL  Hematocrit : 42.2 %  Platelet Count - Automated : 532 K/uL  Mean Cell Volume : 80.5 fl  Mean Cell Hemoglobin : 27.3 pg  Mean Cell Hemoglobin Concentration : 33.9 gm/dL  Auto Neutrophil # : 13.60 K/uL  Auto Lymphocyte # : 2.46 K/uL  Auto Monocyte # : 2.46 K/uL  Auto Eosinophil # : 0.38 K/uL  Auto Basophil # : 0.00 K/uL  Auto Neutrophil % : 72.0 %  Auto Lymphocyte % : 13.0 %  Auto Monocyte % : 13.0 %  Auto Eosinophil % : 2.0 %  Auto Basophil % : 0.0 %    25 Aug 2023 06:28    124    |  95     |  51     ----------------------------<  161    5.3     |  16     |  2.10     Ca    8.7        25 Aug 2023 06:28  Phos  4.3       25 Aug 2023 06:28  Mg     2.6       25 Aug 2023 06:28    TPro  7.1    /  Alb  1.9    /  TBili  0.6    /  DBili  x      /  AST  54     /  ALT  36     /  AlkPhos  133    25 Aug 2023 06:28      Urinalysis Basic - ( 25 Aug 2023 06:28 )    Color: x / Appearance: x / SG: x / pH: x  Gluc: 161 mg/dL / Ketone: x  / Bili: x / Urobili: x   Blood: x / Protein: x / Nitrite: x   Leuk Esterase: x / RBC: x / WBC x   Sq Epi: x / Non Sq Epi: x / Bacteria: x      CAPILLARY BLOOD GLUCOSE      POCT Blood Glucose.: 155 mg/dL (25 Aug 2023 07:50)  POCT Blood Glucose.: 216 mg/dL (24 Aug 2023 21:12)  POCT Blood Glucose.: 198 mg/dL (24 Aug 2023 16:44)        Culture - Body Fluid with Gram Stain (collected 08-23-23 @ 12:35)  Source: Ascites Fl Ascites Fluid  Gram Stain (08-23-23 @ 23:12):    polymorphonuclear leukocytes seen    No organisms seen    by cytocentrifuge  Preliminary Report (08-24-23 @ 15:38):    No growth    Culture - Body Fluid with Gram Stain (collected 08-23-23 @ 09:50)  Source: .Body Fluid Other, Peritoneal Fluid  Gram Stain (08-24-23 @ 02:43):    polymorphonuclear leukocytes seen    No organisms seen    by cytocentrifuge  Preliminary Report (08-25-23 @ 10:31):    No growth        RADIOLOGY & ADDITIONAL TESTS:    Personally reviewed.     Consultant(s) Notes Reviewed:  [x] YES  [ ] NO

## 2023-08-26 NOTE — PROGRESS NOTE ADULT - PROBLEM SELECTOR PLAN 4
MAIA likely in setting of dehydration on ckd3   - S/p 2L NS bolus in ED  - S/p lasix 20mg IV x2 for ascites (8/23) during hospital course  - Cr 2.1 --> 2.7 today likely prerenal azotemia 2/2 post IV Lasix v hepatorenal   - hold spironolactone as per above  - Hold further fluids for now  - Monitor BMP  - Avoid nephrotoxic medications: hold home torsemide and ramipril  - Monitor renal indices  - Nephro (Dr. Harper)  - Management as per ICU

## 2023-08-26 NOTE — PROGRESS NOTE ADULT - ASSESSMENT
68 year old male with situs inversus, heart failure, cirrhosis c/b esophageal varices s/p banding, CKD3, and seizure disorder presented with fever, weakness, and abdominal bloating for several days. Found to have ascites as well as a liver mass concerning for malignancy and evidence of peritoneal carcinomatosis on imaging. Underwent paracentesis 4/23 with removal of 4750 mL fluid. ICU consulted today for hypotension and altered mental status. Labs shows worsening renal failure and transaminitis. Transferred to ICU for further management.    Bedside POCUS performed on arrival. Difficulty cardiac windows given situs inversus. A-line pattern with lung sliding anteriorly.    Hepatic encephalopathy  Decompensated hepatic cirrhosis  Transaminitis  Acute renal failure on CKD3 - ischemic ATN, hepatorenal syndrome  Shock - septic / distributive  Liver Mass  Peritoneal carcinomatosis    NEURO: Encephalopathy due to decompensated cirrhosis, hypotension. Phenytoin for seizure disorder.  CVS: Continue vasopressors. Higher MAP target 80 - 85.  PULM: Saturating well.  GI: NPO. Place NGT. Start lactulose. Continue rifaximin.   RENAL: Insulin + D50 and Lokelma for hyperkalemia. Continue IVF with bicarbonate. Will give albumin. Discussed with nephrology. Not an ideal candidate for dialysis given hemodynamic instability and evidence of underlying malignancy.  ENDO: ISS.   ID: Empiric Zosyn. Check blood cultures.   PPX: HSQ for DVT PPX    Prognosis guarded    Full Code    Family updated on plan of care     68 year old male with situs inversus, heart failure, cirrhosis c/b esophageal varices s/p banding, CKD3, and seizure disorder presented with fever, weakness, and abdominal bloating for several days. Found to have ascites as well as a liver mass concerning for malignancy and evidence of peritoneal carcinomatosis on imaging. Underwent paracentesis 4/23 with removal of 4750 mL fluid. ICU consulted today for hypotension and altered mental status. Labs shows worsening renal failure and transaminitis. Transferred to ICU for further management.    Bedside POCUS performed on arrival. Difficulty cardiac windows given situs inversus. A-line pattern with lung sliding anteriorly.    Hepatic encephalopathy  Decompensated hepatic cirrhosis  Transaminitis  Acute renal failure on CKD3 - ischemic ATN, hepatorenal syndrome  Shock - septic / distributive  Liver Mass  Peritoneal carcinomatosis    NEURO: Encephalopathy due to decompensated cirrhosis, hypotension. Phenytoin for seizure disorder.  CVS: Continue vasopressors. Higher MAP target 80 - 85.   PULM: Saturating well.  GI: NPO. NGT placed and suctioned out 650 mL black material. Concerned for GIB although Hgb has been stable. Will start PPI gtt and octreotide gtt given history of varices.  RENAL: Insulin + D50 for hyperkalemia. Continue IVF with bicarbonate. Will give albumin. Discussed with nephrology. Not an ideal candidate for dialysis given hemodynamic instability and evidence of underlying malignancy.  ENDO: ISS.   ID: Empiric Zosyn. Check blood cultures.   HEME: Monitor CBC  PPX: HSQ for DVT PPX    Prognosis guarded    Full Code. GOC discussed with family.     Family updated on plan of care.      68 year old male with situs inversus, heart failure, cirrhosis c/b esophageal varices s/p banding, CKD3, and seizure disorder presented with fever, weakness, and abdominal bloating for several days. Found to have ascites as well as a liver mass concerning for malignancy and evidence of peritoneal carcinomatosis on imaging. Underwent paracentesis 4/23 with removal of 4750 mL fluid. ICU consulted today for hypotension and altered mental status. Labs shows worsening renal failure and transaminitis. Transferred to ICU for further management.    Bedside POCUS performed on arrival. Difficulty cardiac windows given situs inversus. A-line pattern with lung sliding anteriorly. Small ascites.     Hepatic encephalopathy  Decompensated hepatic cirrhosis  Transaminitis  Acute renal failure on CKD3 - ischemic ATN, hepatorenal syndrome  Shock - septic / distributive  Liver Mass  Peritoneal carcinomatosis    NEURO: Encephalopathy due to decompensated cirrhosis, hypotension. Phenytoin for seizure disorder.  CVS: Continue vasopressors. Higher MAP target 80 - 85.   PULM: Saturating well.  GI: NPO. NGT placed and suctioned out 650 mL black material. Concerned for ?GIB although Hgb has been stable. Will start PPI gtt and octreotide gtt given history of varices. Repeat CBC. Leave NGT to suction.  RENAL: Insulin + D50 for hyperkalemia. Continue IVF with bicarbonate. Will give albumin. Discussed with nephrology. Not an ideal candidate for dialysis given hemodynamic instability and evidence of underlying malignancy.  ENDO: ISS.   ID: Empiric Zosyn. Check blood cultures.   HEME: Monitor CBC  PPX: HSQ for DVT PPX    Prognosis guarded    Full Code. GOC discussed with family.     Family updated on plan of care.

## 2023-08-26 NOTE — PROGRESS NOTE ADULT - SUBJECTIVE AND OBJECTIVE BOX
[facility]  Yo Colin PA-C  [address] [place] [address]  [phone]      INTERVAL HPI/OVERNIGHT EVENTS:  Pt s/e this am  No abdominal pain or GI complaints s/p paracentesis  now upgraded to ICU for hypotension/ hyperkalemia  acute transaminitis         MEDICATIONS  (STANDING):  dextrose 5%. 1000 milliLiter(s) (50 mL/Hr) IV Continuous <Continuous>  dextrose 5%. 1000 milliLiter(s) (100 mL/Hr) IV Continuous <Continuous>  dextrose 50% Injectable 50 milliLiter(s) IV Push once  dextrose 50% Injectable 12.5 Gram(s) IV Push once  dextrose 50% Injectable 25 Gram(s) IV Push once  dextrose 50% Injectable 25 Gram(s) IV Push once  glucagon  Injectable 1 milliGRAM(s) IntraMuscular once  heparin   Injectable 5000 Unit(s) SubCutaneous every 12 hours  insulin glargine Injectable (LANTUS) 15 Unit(s) SubCutaneous every morning  insulin lispro (ADMELOG) corrective regimen sliding scale   SubCutaneous three times a day before meals  insulin lispro (ADMELOG) corrective regimen sliding scale   SubCutaneous at bedtime  insulin lispro Injectable (ADMELOG) 6 Unit(s) SubCutaneous before lunch  insulin lispro Injectable (ADMELOG) 6 Unit(s) SubCutaneous before breakfast  insulin lispro Injectable (ADMELOG) 6 Unit(s) SubCutaneous before dinner  metoprolol tartrate 25 milliGRAM(s) Oral two times a day  midodrine 2.5 milliGRAM(s) Oral <User Schedule>  phenytoin   Capsule 100 milliGRAM(s) Oral daily  rifAXIMin 550 milliGRAM(s) Oral two times a day  senna 2 Tablet(s) Oral at bedtime  simethicone 80 milliGRAM(s) Chew daily  sodium chloride 0.45% 1000 milliLiter(s) (100 mL/Hr) IV Continuous <Continuous>  sodium chloride 0.9% Bolus 500 milliLiter(s) IV Bolus once  sodium zirconium cyclosilicate 10 Gram(s) Oral once    MEDICATIONS  (PRN):  albuterol/ipratropium for Nebulization 3 milliLiter(s) Nebulizer every 6 hours PRN Shortness of Breath and/or Wheezing  melatonin 3 milliGRAM(s) Oral at bedtime PRN Insomnia      Allergies    No Known Allergies    Intolerances          PHYSICAL EXAM  Vital Signs Last 24 Hrs  T(C): 36.7 (26 Aug 2023 04:46), Max: 36.7 (26 Aug 2023 04:46)  T(F): 98.1 (26 Aug 2023 04:46), Max: 98.1 (26 Aug 2023 04:46)  HR: 96 (26 Aug 2023 04:46) (83 - 105)  BP: 101/72 (26 Aug 2023 04:46) (85/64 - 107/74)  BP(mean): --  RR: 17 (26 Aug 2023 04:46) (17 - 18)  SpO2: 97% (26 Aug 2023 04:46) (95% - 97%)    Parameters below as of 26 Aug 2023 04:46  Patient On (Oxygen Delivery Method): room air                        GENERAL:  Appears stated age  HEENT:  NC/AT  CHEST:  Full & symmetric excursion  HEART:  Regular rhythm  ABDOMEN:  Soft, non-tender, non-distended  EXTEREMITIES:  no cyanosis  SKIN:  No rash  NEURO:  Alert        LABS:                        15.4   20.89 )-----------( 468      ( 26 Aug 2023 07:36 )             46.5     08-26    121<L>  |  94<L>  |  68<H>  ----------------------------<  138<H>  6.8<HH>   |  12<L>  |  2.70<H>    Ca    8.5      26 Aug 2023 07:36  Phos  4.3     08-25  Mg     2.6     08-25    TPro  7.5  /  Alb  1.9<L>  /  TBili  0.6  /  DBili  x   /  AST  761<H>  /  ALT  270<H>  /  AlkPhos  178<H>  08-26                    Culture - Body Fluid with Gram Stain (collected 23 Aug 2023 12:35)  Source: Ascites Fl Ascites Fluid  Gram Stain (23 Aug 2023 23:12):    polymorphonuclear leukocytes seen    No organisms seen    by cytocentrifuge  Preliminary Report (24 Aug 2023 15:38):    No growth

## 2023-08-26 NOTE — PROGRESS NOTE ADULT - PROBLEM SELECTOR PLAN 3
On admission patient met sepsis criteria due to hypotension, leukocytosis, tachy and elevated lactate. Unclear source possible SBP  - S/p 2L NS bolus and Rocephin in ED  - completed 5 day course of rocephin for possible SBP. continue to monitor  - Lactate 3 on admission --> repeat lactate WNL   - F/u BCx - pending collection  - Trend WBC and monitor for fever  - ID Dr. Allred consulted  - further management as per ICU

## 2023-08-26 NOTE — PROGRESS NOTE ADULT - REASON FOR ADMISSION
Liver Cirrhosis with ascites, Liver cancer, hyponatremia

## 2023-08-26 NOTE — PROGRESS NOTE ADULT - PROBLEM SELECTOR PROBLEM 5
MAIA (acute kidney injury)
HCC (hepatocellular carcinoma)

## 2023-08-26 NOTE — PROGRESS NOTE ADULT - SUBJECTIVE AND OBJECTIVE BOX
Interval History:  events of above noted. patient now in ICU  Chart reviewed and events noted;   Overnight events:    MEDICATIONS  (STANDING):  albumin human 25% IVPB 50 milliLiter(s) IV Intermittent every 6 hours  chlorhexidine 2% Cloths 1 Application(s) Topical daily  chlorhexidine 4% Liquid 1 Application(s) Topical <User Schedule>  dextrose 5%. 1000 milliLiter(s) (50 mL/Hr) IV Continuous <Continuous>  dextrose 5%. 1000 milliLiter(s) (100 mL/Hr) IV Continuous <Continuous>  dextrose 50% Injectable 25 Gram(s) IV Push once  dextrose 50% Injectable 12.5 Gram(s) IV Push once  dextrose 50% Injectable 25 Gram(s) IV Push once  glucagon  Injectable 1 milliGRAM(s) IntraMuscular once  heparin   Injectable 5000 Unit(s) SubCutaneous every 12 hours  insulin glargine Injectable (LANTUS) 15 Unit(s) SubCutaneous every morning  insulin lispro (ADMELOG) corrective regimen sliding scale   SubCutaneous three times a day before meals  insulin lispro (ADMELOG) corrective regimen sliding scale   SubCutaneous at bedtime  midodrine 10 milliGRAM(s) Oral every 8 hours  midodrine 2.5 milliGRAM(s) Oral <User Schedule>  norepinephrine Infusion 0.05 MICROgram(s)/kG/Min (5.62 mL/Hr) IV Continuous <Continuous>  phenytoin   Capsule 100 milliGRAM(s) Oral daily  piperacillin/tazobactam IVPB.. 3.375 Gram(s) IV Intermittent every 8 hours  rifAXIMin 550 milliGRAM(s) Oral two times a day  senna 2 Tablet(s) Oral at bedtime  simethicone 80 milliGRAM(s) Chew daily  sodium chloride 0.45% 1000 milliLiter(s) (100 mL/Hr) IV Continuous <Continuous>  vasopressin Infusion 0.04 Unit(s)/Min (6 mL/Hr) IV Continuous <Continuous>    MEDICATIONS  (PRN):  albuterol/ipratropium for Nebulization 3 milliLiter(s) Nebulizer every 6 hours PRN Shortness of Breath and/or Wheezing  melatonin 3 milliGRAM(s) Oral at bedtime PRN Insomnia  sodium chloride 0.9% lock flush 10 milliLiter(s) IV Push every 1 hour PRN Pre/post blood products, medications, blood draw, and to maintain line patency      Vital Signs Last 24 Hrs  T(C): 36.3 (26 Aug 2023 15:46), Max: 36.7 (26 Aug 2023 04:46)  T(F): 97.4 (26 Aug 2023 15:46), Max: 98.1 (26 Aug 2023 04:46)  HR: 84 (26 Aug 2023 15:00) (66 - 96)  BP: 139/60 (26 Aug 2023 15:00) (52/25 - 176/71)  BP(mean): 87 (26 Aug 2023 15:00) (34 - 102)  RR: 29 (26 Aug 2023 15:00) (17 - 32)  SpO2: 97% (26 Aug 2023 15:00) (94% - 100%)    Parameters below as of 26 Aug 2023 11:11  Patient On (Oxygen Delivery Method): nasal cannula  O2 Flow (L/min): 3      PHYSICAL EXAM  General: adult in NAD  HEENT: clear oropharynx, anicteric sclera, pink conjunctivae  Neck: supple  CV: normal S1S2 with no murmur rubs or gallops  Lungs: clear to auscultation, no wheezes, no rhales  Abdomen: soft non-tender non-distended, no hepato/splenomegaly  Ext: no clubbing cyanosis or edema  Skin: no rashes and no petichiae  Neuro: alert and oriented X3 no focal deficits      LABS:  CBC Full  -  ( 26 Aug 2023 13:50 )  WBC Count : 22.10 K/uL  RBC Count : 5.27 M/uL  Hemoglobin : 14.3 g/dL  Hematocrit : 43.9 %  Platelet Count - Automated : 462 K/uL  Mean Cell Volume : 83.3 fl  Mean Cell Hemoglobin : 27.1 pg  Mean Cell Hemoglobin Concentration : 32.6 gm/dL  Auto Neutrophil # : 15.51 K/uL  Auto Lymphocyte # : 2.10 K/uL  Auto Monocyte # : 2.62 K/uL  Auto Eosinophil # : 0.09 K/uL  Auto Basophil # : 0.03 K/uL  Auto Neutrophil % : 70.2 %  Auto Lymphocyte % : 9.5 %  Auto Monocyte % : 11.9 %  Auto Eosinophil % : 0.4 %  Auto Basophil % : 0.1 %    08-26    122<L>  |  94<L>  |  74<H>  ----------------------------<  154<H>  6.4<HH>   |  11<L>  |  3.30<H>    Ca    9.0      26 Aug 2023 13:50  Phos  4.4     08-26  Mg     3.5     08-26    TPro  7.0  /  Alb  1.9<L>  /  TBili  0.8  /  DBili  x   /  AST  2350<H>  /  ALT  699<H>  /  AlkPhos  163<H>  08-26        fe studies      WBC trend  22.10 K/uL (08-26-23 @ 13:50)  20.89 K/uL (08-26-23 @ 07:36)  18.89 K/uL (08-25-23 @ 06:28)  14.87 K/uL (08-24-23 @ 06:00)      Hgb trend  14.3 g/dL (08-26-23 @ 13:50)  15.4 g/dL (08-26-23 @ 07:36)  14.3 g/dL (08-25-23 @ 06:28)  14.1 g/dL (08-24-23 @ 06:00)      plt trend  462 K/uL (08-26-23 @ 13:50)  468 K/uL (08-26-23 @ 07:36)  532 K/uL (08-25-23 @ 06:28)  520 K/uL (08-24-23 @ 06:00)        RADIOLOGY & ADDITIONAL STUDIES:

## 2023-08-26 NOTE — PROGRESS NOTE ADULT - PROBLEM SELECTOR PLAN 6
As per family, Pt has known Hx of HF w/ EF ~30-40%  - Continue home coreg   - D/michelle digoxin as per cardio  - Digoxin level WNL (8/22), 0.5 low (8/24), WNL (8/26)  - S/p digoxin x2 during hospital course for tachycardia. Difficult to give BB given hypotension  - S/p Lasix 20mg IV x2 for symptomatic ascites   - hold home spironolactone as per above  - Hold home torsemide due to MAIA and hyponatremia  - F/u TTE   - Cardio Dr. Swift group consulted

## 2023-08-26 NOTE — PROGRESS NOTE ADULT - NS ATTEND AMEND GEN_ALL_CORE FT
Reported systolic HF, with presentation for weakness, hyponatremia, liver cirrhosis with ascites, with concern for malignancy/HCC  Appears volume overloaded today and is tachypneic at the bedside.   With history of situs inversus.   Repeat EKG with leads on right precordium   Would give IV lasix today  Echocardiogram to evaluate lv function  Continue BB and replete electrolytes, K>4, Mg>2    Discussed with primary team. Will continue to follow closely
reported systolic HF with severe lv dysfxn  presentation for weakness, hyponatremia, liver cirrhosis with ascites, with concern for malignancy/HCC  s/p paracentesis draining almost 5 L  no sign of acute ischemia  less overloaded     echocardiogram to evaluate lv function pending from yesterday, will followup  did have some nsvt on telemetry, and had 15 min of SVT overnight 8/24-25  was changed from coreg to metoprolol given hypotension  had elected to continue aldactone but now with severe hyperkalemia, and transferred to icu  at risk of decompensation  management of k as per icu
reported systolic HF, with presentation for weakness, hyponatremia, liver cirrhosis with ascites, with concern for malignancy/HCC  s/p paracentesis draining almost 5 L  no sign of acute ischemia  mildly overloaded, and agree with iv lasix today  also interesting history of situs inversus  check digoxin level  echocardiogram to evaluate lv function  did have some nsvt on telemetry, cont to monitor, cont bb, and replete electrolytes.  dw patient and family in detail.
reported systolic HF, with presentation for weakness, hyponatremia, liver cirrhosis with ascites, with concern for malignancy/HCC  s/p paracentesis draining almost 5 L  no sign of acute ischemia  less overloaded     echocardiogram to evaluate lv function  did have some nsvt on telemetry, and had 15 min of SVT overnight  to change coreg to metoprolol given hypotension  cont mido  cont to monitor  dw family in detail.

## 2023-08-26 NOTE — PROGRESS NOTE ADULT - SUBJECTIVE AND OBJECTIVE BOX
[facility] -- Abhi Alvarenga Pannella, Patel, Savella, Goodger, Cohen: Office # [phone]    Follow Up: CHF      Subjective/Observations: Patient seen and examined. Patient awake, alert, resting in bed. No complaints of chest pain, dyspnea, palpitations or dizziness. No signs of orthopnea or PND. Tolerating room air. Ambulating to bathroom.     REVIEW OF SYSTEMS: All other review of systems are negative unless indicated above    PAST MEDICAL & SURGICAL HISTORY:  T2DM (type 2 diabetes mellitus)      History of seizure      History of bradycardia      GERD (gastroesophageal reflux disease)      History of cirrhosis of liver      HLD (hyperlipidemia)      HTN (hypertension)      H/O esophageal varices      MEDICATIONS  (STANDING):  dextrose 5%. 1000 milliLiter(s) (50 mL/Hr) IV Continuous <Continuous>  dextrose 5%. 1000 milliLiter(s) (100 mL/Hr) IV Continuous <Continuous>  dextrose 50% Injectable 25 Gram(s) IV Push once  dextrose 50% Injectable 12.5 Gram(s) IV Push once  dextrose 50% Injectable 25 Gram(s) IV Push once  glucagon  Injectable 1 milliGRAM(s) IntraMuscular once  heparin   Injectable 5000 Unit(s) SubCutaneous every 12 hours  insulin glargine Injectable (LANTUS) 15 Unit(s) SubCutaneous every morning  insulin lispro (ADMELOG) corrective regimen sliding scale   SubCutaneous at bedtime  insulin lispro (ADMELOG) corrective regimen sliding scale   SubCutaneous three times a day before meals  insulin lispro Injectable (ADMELOG) 6 Unit(s) SubCutaneous before lunch  insulin lispro Injectable (ADMELOG) 6 Unit(s) SubCutaneous before breakfast  insulin lispro Injectable (ADMELOG) 6 Unit(s) SubCutaneous before dinner  metoprolol tartrate 25 milliGRAM(s) Oral two times a day  midodrine 2.5 milliGRAM(s) Oral <User Schedule>  phenytoin   Capsule 100 milliGRAM(s) Oral daily  rifAXIMin 550 milliGRAM(s) Oral two times a day  senna 2 Tablet(s) Oral at bedtime  simethicone 80 milliGRAM(s) Chew daily  spironolactone 25 milliGRAM(s) Oral daily    MEDICATIONS  (PRN):  albuterol/ipratropium for Nebulization 3 milliLiter(s) Nebulizer every 6 hours PRN Shortness of Breath and/or Wheezing  melatonin 3 milliGRAM(s) Oral at bedtime PRN Insomnia    Allergies    No Known Allergies    Intolerances      Vital Signs Last 24 Hrs  T(C): 36.7 (26 Aug 2023 04:46), Max: 36.7 (26 Aug 2023 04:46)  T(F): 98.1 (26 Aug 2023 04:46), Max: 98.1 (26 Aug 2023 04:46)  HR: 96 (26 Aug 2023 04:46) (83 - 106)  BP: 101/72 (26 Aug 2023 04:46) (85/64 - 120/89)  BP(mean): --  RR: 17 (26 Aug 2023 04:46) (17 - 18)  SpO2: 97% (26 Aug 2023 04:46) (94% - 97%)    Parameters below as of 26 Aug 2023 04:46  Patient On (Oxygen Delivery Method): room air      I&O's Summary        TELE: SR 90-100s  PHYSICAL EXAM:  Constitutional: NAD, awake and alert, Frail   HEENT: Moist Mucous Membranes, Anicteric  Pulmonary: Non-labored, breath sounds are clear bilaterally, No wheezing, rales or rhonchi  Cardiovascular: Regular, S1 and S2, No murmurs, No rubs, gallops or clicks  Gastrointestinal:  soft, nontender, nondistended   Lymph: No peripheral edema. No lymphadenopathy.   Skin: No visible rashes or ulcers.  Psych:  Mood & affect appropriate      LABS: All Labs Reviewed:                        14.3   18.89 )-----------( 532      ( 25 Aug 2023 06:28 )             42.2                         14.1   14.87 )-----------( 520      ( 24 Aug 2023 06:00 )             41.8     25 Aug 2023 06:28    124    |  95     |  51     ----------------------------<  161    5.3     |  16     |  2.10   24 Aug 2023 16:20    122    |  94     |  42     ----------------------------<  195    4.9     |  20     |  1.90   24 Aug 2023 06:00    121    |  93     |  39     ----------------------------<  362    4.6     |  18     |  1.80     Ca    8.7        25 Aug 2023 06:28  Ca    8.5        24 Aug 2023 16:20  Ca    8.4        24 Aug 2023 06:00  Phos  4.3       25 Aug 2023 06:28  Phos  4.2       24 Aug 2023 06:00  Mg     2.6       25 Aug 2023 06:28  Mg     2.4       24 Aug 2023 06:00    TPro  7.1    /  Alb  1.9    /  TBili  0.6    /  DBili  x      /  AST  54     /  ALT  36     /  AlkPhos  133    25 Aug 2023 06:28  TPro  7.3    /  Alb  2.0    /  TBili  0.6    /  DBili  x      /  AST  52     /  ALT  35     /  AlkPhos  151    24 Aug 2023 06:00   LIVER FUNCTIONS - ( 25 Aug 2023 06:28 )  Alb: 1.9 g/dL / Pro: 7.1 g/dL / ALK PHOS: 133 U/L / ALT: 36 U/L / AST: 54 U/L / GGT: x           Cholesterol: 97 mg/dL (08-22-23 @ 07:33)  HDL Cholesterol: 34 mg/dL (08-22-23 @ 07:33)  Triglycerides, Serum: 69 mg/dL (08-22-23 @ 07:33)    12 Lead ECG:   Ventricular Rate 112 BPM    Atrial Rate 112 BPM    P-R Interval 156 ms    QRS Duration 122 ms    Q-T Interval 350 ms    QTC Calculation(Bazett) 477 ms    P Axis 78 degrees    R Axis -47 degrees    T Axis 84 degrees    Diagnosis Line Sinus tachycardia  Nonspecific intraventricular conduction delay  Left axis deviation  Anterior infarct  Abnormal ECG  Confirmed by kedar Toledo ([id number]) on 8/22/2023 4:11:03 PM (08-22-23 @ 12:23)

## 2023-08-26 NOTE — PROGRESS NOTE ADULT - PROBLEM SELECTOR PLAN 11
Chronic, however on admission presented with hypotension SBP the 80's that improved with IVF   - Continue home coreg with holding parameters  - Gave midodrine to increase BP 2.5 mg po bid   - Hold home torsemide and ramipril in setting of MAIA and hyponatremia    #HLD  Chronic  - Hold home statins due to transaminitis

## 2023-08-26 NOTE — PROGRESS NOTE ADULT - PROBLEM SELECTOR PLAN 2
Na 122 on admission -   hypervolumic  hyponatremia   - S/p 2L NS bolus in ED  - Na continues to remain low in the ~121  - Continue PO fluid restriction   - Fluid restrict 1.2L daily.  - Started sodium tabs as per nephro    - Nephro Dr. Harper following,     #Hyperkalemia  - Potassium 6.8 today.  - Given insulin 5U + dextrose, lokelma  - dced spironolactone  - Replete w/ lokelma prn  - Nephro following  - Further management as per ICU

## 2023-08-26 NOTE — PROGRESS NOTE ADULT - ASSESSMENT
68 M with End Stage Liver Disease/Decompensated Cirrhosis   now wtih Ascites/Liver Mass/?Peritoneal Carcinomatosis    elevated LFTs likely from shock liver from hypotension  - avoid all non-essential hepatotoxic drugs	  IVAB for SBP prophylaxis  Oncology Eval  s/p paracentesis  fluid studies ordered including cytopathology  AFP wnl  xifaxin 550 mg bid  renal eval  continue carvedilol    I reviewed the overnight course of events on the unit, re-confirming the patient history. I discussed the care with the patient and their family  Differential diagnosis and plan of care discussed with patient after the evaluation  40 minutes spent on total encounter of which more than fifty percent of the encounter was spent counseling and/or coordinating care by the attending physician.  Advanced care planning was discussed with patient and family.  Advanced care planning forms were reviewed and discussed.  Risks, benefits and alternatives of gastroenterologic procedures were discussed in detail and all questions were answered.

## 2023-08-26 NOTE — PROGRESS NOTE ADULT - SUBJECTIVE AND OBJECTIVE BOX
Patient is a 68y old  Male who presents with a chief complaint of Liver Cirrhosis with ascites, Liver cancer, hyponatremia (26 Aug 2023 12:17)      INTERVAL HPI/OVERNIGHT EVENTS: Patient was seen and examined at bedside. No acute overnight events. Family at bedside and preferred they acted as . Patient reports improvement to abdominal pain, just mild SOB that was unchanged from yesterday. States he feels less bloated. Family states he looks better. Denies HA, chest pain, urinary symptoms.    Rapid response was called for lethargy. Pt was difficult to arouse with hypotension and unable to obtain manual BP. Transferred to ICU for further management. See rapid response note for further details.     MEDICATIONS  (STANDING):  albumin human 25% IVPB 50 milliLiter(s) IV Intermittent every 6 hours  chlorhexidine 2% Cloths 1 Application(s) Topical daily  chlorhexidine 4% Liquid 1 Application(s) Topical <User Schedule>  dextrose 5%. 1000 milliLiter(s) (50 mL/Hr) IV Continuous <Continuous>  dextrose 5%. 1000 milliLiter(s) (100 mL/Hr) IV Continuous <Continuous>  dextrose 50% Injectable 12.5 Gram(s) IV Push once  dextrose 50% Injectable 25 Gram(s) IV Push once  dextrose 50% Injectable 25 Gram(s) IV Push once  glucagon  Injectable 1 milliGRAM(s) IntraMuscular once  heparin   Injectable 5000 Unit(s) SubCutaneous every 12 hours  insulin glargine Injectable (LANTUS) 15 Unit(s) SubCutaneous every morning  insulin lispro (ADMELOG) corrective regimen sliding scale   SubCutaneous at bedtime  insulin lispro (ADMELOG) corrective regimen sliding scale   SubCutaneous three times a day before meals  insulin lispro Injectable (ADMELOG) 6 Unit(s) SubCutaneous before breakfast  insulin lispro Injectable (ADMELOG) 6 Unit(s) SubCutaneous before lunch  insulin lispro Injectable (ADMELOG) 6 Unit(s) SubCutaneous before dinner  midodrine 10 milliGRAM(s) Oral every 8 hours  midodrine 2.5 milliGRAM(s) Oral <User Schedule>  norepinephrine Infusion 0.05 MICROgram(s)/kG/Min (5.62 mL/Hr) IV Continuous <Continuous>  phenytoin   Capsule 100 milliGRAM(s) Oral daily  piperacillin/tazobactam IVPB.. 3.375 Gram(s) IV Intermittent every 8 hours  rifAXIMin 550 milliGRAM(s) Oral two times a day  senna 2 Tablet(s) Oral at bedtime  simethicone 80 milliGRAM(s) Chew daily  sodium chloride 0.45% 1000 milliLiter(s) (100 mL/Hr) IV Continuous <Continuous>  vasopressin Infusion 0.04 Unit(s)/Min (6 mL/Hr) IV Continuous <Continuous>    MEDICATIONS  (PRN):  albuterol/ipratropium for Nebulization 3 milliLiter(s) Nebulizer every 6 hours PRN Shortness of Breath and/or Wheezing  melatonin 3 milliGRAM(s) Oral at bedtime PRN Insomnia  sodium chloride 0.9% lock flush 10 milliLiter(s) IV Push every 1 hour PRN Pre/post blood products, medications, blood draw, and to maintain line patency      Allergies    No Known Allergies    Intolerances        REVIEW OF SYSTEMS:  CONSTITUTIONAL:  No fever or chills  HEENT:  No headache, no sore throat  RESPIRATORY: (+) mild Shortness of breath. No cough or wheezing  CARDIOVASCULAR: (+) L sided lower chest/rib pain, tachy  GASTROINTESTINAL: (+) LUQ abdominal pain (improving) and constipation. No nausea, vomiting, or diarrhea  GENITOURINARY: No dysuria or hematuria  NEUROLOGICAL: (+) Generalized weakness. No dizziness  MUSCULOSKELETAL: no myalgias     Vital Signs Last 24 Hrs  T(C): 34.7 (26 Aug 2023 11:05), Max: 36.7 (26 Aug 2023 04:46)  T(F): 94.5 (26 Aug 2023 11:05), Max: 98.1 (26 Aug 2023 04:46)  HR: 82 (26 Aug 2023 12:12) (66 - 96)  BP: 122/57 (26 Aug 2023 12:12) (52/25 - 122/57)  BP(mean): 82 (26 Aug 2023 12:12) (34 - 96)  RR: 30 (26 Aug 2023 12:12) (17 - 32)  SpO2: 100% (26 Aug 2023 12:12) (94% - 100%)    Parameters below as of 26 Aug 2023 11:11  Patient On (Oxygen Delivery Method): nasal cannula  O2 Flow (L/min): 3      PHYSICAL EXAM:  GENERAL: NAD, sitting in chair eating breakfast  HEAD:  Atraumatic, Normocephalic  EYES: EOMI conjunctiva and sclera clear  ENT: Dry oral mucous   NECK: Supple  CHEST/LUNG: (+) Tenderness to palpation in Lt lower chest/rib region. CTA b/l. No rales, rhonchi, or wheezing.   HEART: S1,S2. Regular rhythm. (+) Tachy. No murmurs  ABDOMEN: BSx4. Soft, distended (improved after paracentesis, but slightly worsening). (+) Tenderness to palpation in LUQ region. No rebound or guarding  EXTREMITIES: No clubbing, cyanosis, or edema  NERVOUS SYSTEM:  Alert & Oriented X3, speech clear, sensory/motor intact b/l  gu intact       LABS:                        15.4   20.89 )-----------( 468      ( 26 Aug 2023 07:36 )             46.5     CBC Full  -  ( 26 Aug 2023 07:36 )  WBC Count : 20.89 K/uL  Hemoglobin : 15.4 g/dL  Hematocrit : 46.5 %  Platelet Count - Automated : 468 K/uL  Mean Cell Volume : 82.2 fl  Mean Cell Hemoglobin : 27.2 pg  Mean Cell Hemoglobin Concentration : 33.1 gm/dL  Auto Neutrophil # : 17.76 K/uL  Auto Lymphocyte # : 1.67 K/uL  Auto Monocyte # : 1.04 K/uL  Auto Eosinophil # : 0.21 K/uL  Auto Basophil # : 0.00 K/uL  Auto Neutrophil % : 85.0 %  Auto Lymphocyte % : 8.0 %  Auto Monocyte % : 5.0 %  Auto Eosinophil % : 1.0 %  Auto Basophil % : 0.0 %    26 Aug 2023 07:36    121    |  94     |  68     ----------------------------<  138    6.8     |  12     |  2.70     Ca    8.5        26 Aug 2023 07:36    TPro  7.5    /  Alb  1.9    /  TBili  0.6    /  DBili  x      /  AST  761    /  ALT  270    /  AlkPhos  178    26 Aug 2023 07:36      Urinalysis Basic - ( 26 Aug 2023 07:36 )    Color: x / Appearance: x / SG: x / pH: x  Gluc: 138 mg/dL / Ketone: x  / Bili: x / Urobili: x   Blood: x / Protein: x / Nitrite: x   Leuk Esterase: x / RBC: x / WBC x   Sq Epi: x / Non Sq Epi: x / Bacteria: x      CAPILLARY BLOOD GLUCOSE      POCT Blood Glucose.: 99 mg/dL (26 Aug 2023 11:46)  POCT Blood Glucose.: 122 mg/dL (26 Aug 2023 10:37)  POCT Blood Glucose.: 142 mg/dL (26 Aug 2023 08:24)  POCT Blood Glucose.: 161 mg/dL (25 Aug 2023 21:24)  POCT Blood Glucose.: 208 mg/dL (25 Aug 2023 17:06)        Culture - Body Fluid with Gram Stain (collected 08-23-23 @ 12:35)  Source: Ascites Fl Ascites Fluid  Gram Stain (08-23-23 @ 23:12):    polymorphonuclear leukocytes seen    No organisms seen    by cytocentrifuge  Preliminary Report (08-24-23 @ 15:38):    No growth    Culture - Body Fluid with Gram Stain (collected 08-23-23 @ 09:50)  Source: .Body Fluid Other, Peritoneal Fluid  Gram Stain (08-24-23 @ 02:43):    polymorphonuclear leukocytes seen    No organisms seen    by cytocentrifuge  Preliminary Report (08-25-23 @ 10:31):    No growth        RADIOLOGY & ADDITIONAL TESTS:  None.  Personally reviewed.     Consultant(s) Notes Reviewed:  [x] YES  [ ] NO     Patient is a 68y old  Male who presents with a chief complaint of Liver Cirrhosis with ascites, Liver cancer, hyponatremia (26 Aug 2023 12:17)      INTERVAL HPI/OVERNIGHT EVENTS: Patient was seen and examined at bedside. No acute overnight events. Family at bedside and preferred they acted as . Patient reports improvement to abdominal pain, just mild SOB that was unchanged from yesterday. States he feels less bloated . Denies HA, chest pain, urinary symptoms.     later   Pt was difficult to arouse with hypotension  while sitting  in bathroom  toilet  unable to obtain manual BP  rapid  response called   . Transferred to ICU for  possible pressure support . See rapid response note for further details.     MEDICATIONS  (STANDING):  albumin human 25% IVPB 50 milliLiter(s) IV Intermittent every 6 hours  chlorhexidine 2% Cloths 1 Application(s) Topical daily  chlorhexidine 4% Liquid 1 Application(s) Topical <User Schedule>  dextrose 5%. 1000 milliLiter(s) (50 mL/Hr) IV Continuous <Continuous>  dextrose 5%. 1000 milliLiter(s) (100 mL/Hr) IV Continuous <Continuous>  dextrose 50% Injectable 12.5 Gram(s) IV Push once  dextrose 50% Injectable 25 Gram(s) IV Push once  dextrose 50% Injectable 25 Gram(s) IV Push once  glucagon  Injectable 1 milliGRAM(s) IntraMuscular once  heparin   Injectable 5000 Unit(s) SubCutaneous every 12 hours  insulin glargine Injectable (LANTUS) 15 Unit(s) SubCutaneous every morning  insulin lispro (ADMELOG) corrective regimen sliding scale   SubCutaneous at bedtime  insulin lispro (ADMELOG) corrective regimen sliding scale   SubCutaneous three times a day before meals  insulin lispro Injectable (ADMELOG) 6 Unit(s) SubCutaneous before breakfast  insulin lispro Injectable (ADMELOG) 6 Unit(s) SubCutaneous before lunch  insulin lispro Injectable (ADMELOG) 6 Unit(s) SubCutaneous before dinner  midodrine 10 milliGRAM(s) Oral every 8 hours  midodrine 2.5 milliGRAM(s) Oral <User Schedule>  norepinephrine Infusion 0.05 MICROgram(s)/kG/Min (5.62 mL/Hr) IV Continuous <Continuous>  phenytoin   Capsule 100 milliGRAM(s) Oral daily  piperacillin/tazobactam IVPB.. 3.375 Gram(s) IV Intermittent every 8 hours  rifAXIMin 550 milliGRAM(s) Oral two times a day  senna 2 Tablet(s) Oral at bedtime  simethicone 80 milliGRAM(s) Chew daily  sodium chloride 0.45% 1000 milliLiter(s) (100 mL/Hr) IV Continuous <Continuous>  vasopressin Infusion 0.04 Unit(s)/Min (6 mL/Hr) IV Continuous <Continuous>    MEDICATIONS  (PRN):  albuterol/ipratropium for Nebulization 3 milliLiter(s) Nebulizer every 6 hours PRN Shortness of Breath and/or Wheezing  melatonin 3 milliGRAM(s) Oral at bedtime PRN Insomnia  sodium chloride 0.9% lock flush 10 milliLiter(s) IV Push every 1 hour PRN Pre/post blood products, medications, blood draw, and to maintain line patency      Allergies    No Known Allergies    Intolerances        REVIEW OF SYSTEMS:  CONSTITUTIONAL:  No fever or chills  HEENT:  No headache, no sore throat  RESPIRATORY: (+) mild Shortness of breath. No cough or wheezing  CARDIOVASCULAR: (+) L sided lower chest/rib pain, tachy  GASTROINTESTINAL: (+) LUQ abdominal pain (improving) and constipation. No nausea, vomiting, or diarrhea  GENITOURINARY: No dysuria or hematuria  NEUROLOGICAL: (+) Generalized weakness. No dizziness  MUSCULOSKELETAL: no myalgias     Vital Signs Last 24 Hrs  T(C): 34.7 (26 Aug 2023 11:05), Max: 36.7 (26 Aug 2023 04:46)  T(F): 94.5 (26 Aug 2023 11:05), Max: 98.1 (26 Aug 2023 04:46)  HR: 82 (26 Aug 2023 12:12) (66 - 96)  BP: 122/57 (26 Aug 2023 12:12) (52/25 - 122/57)  BP(mean): 82 (26 Aug 2023 12:12) (34 - 96)  RR: 30 (26 Aug 2023 12:12) (17 - 32)  SpO2: 100% (26 Aug 2023 12:12) (94% - 100%)    Parameters below as of 26 Aug 2023 11:11  Patient On (Oxygen Delivery Method): nasal cannula  O2 Flow (L/min): 3      PHYSICAL EXAM:  GENERAL: NAD, sitting in chair eating breakfast  HEAD:  Atraumatic, Normocephalic  EYES: EOMI conjunctiva and sclera clear  ENT: Dry oral mucous   NECK: Supple  CHEST/LUNG: (+) Tenderness to palpation in Lt lower chest/rib region. CTA b/l. No rales, rhonchi, or wheezing.   HEART: S1,S2. Regular rhythm. (+) Tachy. No murmurs  ABDOMEN: BSx4. Soft, distended (improved after paracentesis, but slightly worsening). (+) Tenderness to palpation in LUQ region. No rebound or guarding  EXTREMITIES: No clubbing, cyanosis, or edema  NERVOUS SYSTEM:  Alert & Oriented X3, speech clear, sensory/motor intact b/l  gu intact       LABS:                        15.4   20.89 )-----------( 468      ( 26 Aug 2023 07:36 )             46.5     CBC Full  -  ( 26 Aug 2023 07:36 )  WBC Count : 20.89 K/uL  Hemoglobin : 15.4 g/dL  Hematocrit : 46.5 %  Platelet Count - Automated : 468 K/uL  Mean Cell Volume : 82.2 fl  Mean Cell Hemoglobin : 27.2 pg  Mean Cell Hemoglobin Concentration : 33.1 gm/dL  Auto Neutrophil # : 17.76 K/uL  Auto Lymphocyte # : 1.67 K/uL  Auto Monocyte # : 1.04 K/uL  Auto Eosinophil # : 0.21 K/uL  Auto Basophil # : 0.00 K/uL  Auto Neutrophil % : 85.0 %  Auto Lymphocyte % : 8.0 %  Auto Monocyte % : 5.0 %  Auto Eosinophil % : 1.0 %  Auto Basophil % : 0.0 %    26 Aug 2023 07:36    121    |  94     |  68     ----------------------------<  138    6.8     |  12     |  2.70     Ca    8.5        26 Aug 2023 07:36    TPro  7.5    /  Alb  1.9    /  TBili  0.6    /  DBili  x      /  AST  761    /  ALT  270    /  AlkPhos  178    26 Aug 2023 07:36      Urinalysis Basic - ( 26 Aug 2023 07:36 )    Color: x / Appearance: x / SG: x / pH: x  Gluc: 138 mg/dL / Ketone: x  / Bili: x / Urobili: x   Blood: x / Protein: x / Nitrite: x   Leuk Esterase: x / RBC: x / WBC x   Sq Epi: x / Non Sq Epi: x / Bacteria: x      CAPILLARY BLOOD GLUCOSE      POCT Blood Glucose.: 99 mg/dL (26 Aug 2023 11:46)  POCT Blood Glucose.: 122 mg/dL (26 Aug 2023 10:37)  POCT Blood Glucose.: 142 mg/dL (26 Aug 2023 08:24)  POCT Blood Glucose.: 161 mg/dL (25 Aug 2023 21:24)  POCT Blood Glucose.: 208 mg/dL (25 Aug 2023 17:06)        Culture - Body Fluid with Gram Stain (collected 08-23-23 @ 12:35)  Source: Ascites Fl Ascites Fluid  Gram Stain (08-23-23 @ 23:12):    polymorphonuclear leukocytes seen    No organisms seen    by cytocentrifuge  Preliminary Report (08-24-23 @ 15:38):    No growth    Culture - Body Fluid with Gram Stain (collected 08-23-23 @ 09:50)  Source: .Body Fluid Other, Peritoneal Fluid  Gram Stain (08-24-23 @ 02:43):    polymorphonuclear leukocytes seen    No organisms seen    by cytocentrifuge  Preliminary Report (08-25-23 @ 10:31):    No growth        RADIOLOGY & ADDITIONAL TESTS:  None.  Personally reviewed.     Consultant(s) Notes Reviewed:  [x] YES  [ ] NO

## 2023-08-26 NOTE — PROGRESS NOTE ADULT - PROBLEM/PLAN-10
Pt arrives from home with complaints of nausea, vomiting, and diarrhea since last night. Pt states that she was at a friends and had 2 drinks. Later that night it all started.  
DISPLAY PLAN FREE TEXT

## 2023-08-26 NOTE — PROGRESS NOTE ADULT - PROBLEM SELECTOR PROBLEM 2
HCC (hepatocellular carcinoma)
HCC (hepatocellular carcinoma)
Hyponatremia
HCC (hepatocellular carcinoma)
HCC (hepatocellular carcinoma)

## 2023-08-26 NOTE — PROGRESS NOTE ADULT - PROVIDER SPECIALTY LIST ADULT
Cardiology
Gastroenterology
Gastroenterology
Heme/Onc
Infectious Disease
Nephrology
Cardiology
Endocrinology
Gastroenterology
Heme/Onc
Heme/Onc
Infectious Disease
Infectious Disease
Nephrology
Pulmonology
Pulmonology
Gastroenterology
Nephrology
Nephrology
Pulmonology
Pulmonology
Critical Care
Hospitalist
Diabetes
Hospitalist

## 2023-08-26 NOTE — PROGRESS NOTE ADULT - PROBLEM SELECTOR PLAN 10
Chronic  - Continue home phenytoin  - Seizure precautions

## 2023-08-26 NOTE — PROGRESS NOTE ADULT - ASSESSMENT
67 y/o man visiting from [place], hx cirrhosis post esophageal varices banding 2020 2021, situs inversus, HF?, bradycardia, HTN, HLD, T2DM, seizure, GERD  Came to ER c/o 4-5 d weakness, malaise, LUQ abdominal pain, abdomen bloating  ED course:  Vitals: BP: 87/60-> 113/92, HR: 96, Temp: 97.9, RR: 18, SpO2: 98% on RA  Labs significant for: WBC 14.02, Hgb 12.9, Plt 471, Na 122, Cl 93, BUN 25, Cr 1.8, Glu 450, Alb 2.1, AST 51, eGFR 41, Lactate 3  UA: +glu >1000  CT a/p:  Total situs inversus. Cirrhosis w exophytic luis enhancing low density mass extending to diaphragm 5.2x4.1cm, add 5cm cyst, low density lesions too small to renetta. Mod ascites w abd carcinomatosis and implants noted up to 3.9cm    wife denies ever had imaging of abdomen done, reports first endoscopy was done due to "ulcer bleed"  dc Etoh and tobaco 1999  no night sweats fever anorecia weight loss, luis edema    -hx esophageal banding 2020, 2021, CT a/p w liver cirrhosis, 5,2cm exophytic liver mass mod scites and sig abdominal carcinomatosis  -agree worrisome for met hepatocellular carcinoma  -have ordered AFP, may still need tissue bx for diagnosis and NGS markers studies for potential targeted therapy  -GI consulted  AFP normal  s/p paracentesis  cytology suspicious for malignancy and await IHC  continue present medical/ID and GI evaluation  patient now transferred to ICU hypotensive  prognosis guarded    further recommendations pending above

## 2023-08-26 NOTE — PROGRESS NOTE ADULT - SUBJECTIVE AND OBJECTIVE BOX
Date/Time Patient Seen:  		  Referring MD:   Data Reviewed	       Patient is a 68y old  Male who presents with a chief complaint of Liver Cirrhosis with ascites, Liver cancer, hyponatremia (25 Aug 2023 17:48)      Subjective/HPI     PAST MEDICAL & SURGICAL HISTORY:  T2DM (type 2 diabetes mellitus)    History of seizure    History of bradycardia    GERD (gastroesophageal reflux disease)    History of cirrhosis of liver    HLD (hyperlipidemia)    HTN (hypertension)    H/O esophageal varices          Medication list         MEDICATIONS  (STANDING):  dextrose 5%. 1000 milliLiter(s) (50 mL/Hr) IV Continuous <Continuous>  dextrose 5%. 1000 milliLiter(s) (100 mL/Hr) IV Continuous <Continuous>  dextrose 50% Injectable 12.5 Gram(s) IV Push once  dextrose 50% Injectable 25 Gram(s) IV Push once  dextrose 50% Injectable 25 Gram(s) IV Push once  glucagon  Injectable 1 milliGRAM(s) IntraMuscular once  heparin   Injectable 5000 Unit(s) SubCutaneous every 12 hours  insulin glargine Injectable (LANTUS) 15 Unit(s) SubCutaneous every morning  insulin lispro (ADMELOG) corrective regimen sliding scale   SubCutaneous three times a day before meals  insulin lispro (ADMELOG) corrective regimen sliding scale   SubCutaneous at bedtime  insulin lispro Injectable (ADMELOG) 6 Unit(s) SubCutaneous before breakfast  insulin lispro Injectable (ADMELOG) 6 Unit(s) SubCutaneous before lunch  insulin lispro Injectable (ADMELOG) 6 Unit(s) SubCutaneous before dinner  metoprolol tartrate 25 milliGRAM(s) Oral two times a day  midodrine 2.5 milliGRAM(s) Oral <User Schedule>  phenytoin   Capsule 100 milliGRAM(s) Oral daily  rifAXIMin 550 milliGRAM(s) Oral two times a day  senna 2 Tablet(s) Oral at bedtime  simethicone 80 milliGRAM(s) Chew daily  spironolactone 25 milliGRAM(s) Oral daily    MEDICATIONS  (PRN):  albuterol/ipratropium for Nebulization 3 milliLiter(s) Nebulizer every 6 hours PRN Shortness of Breath and/or Wheezing  melatonin 3 milliGRAM(s) Oral at bedtime PRN Insomnia         Vitals log        ICU Vital Signs Last 24 Hrs  T(C): 36.7 (26 Aug 2023 04:46), Max: 36.7 (26 Aug 2023 04:46)  T(F): 98.1 (26 Aug 2023 04:46), Max: 98.1 (26 Aug 2023 04:46)  HR: 96 (26 Aug 2023 04:46) (83 - 106)  BP: 101/72 (26 Aug 2023 04:46) (85/64 - 120/89)  BP(mean): --  ABP: --  ABP(mean): --  RR: 17 (26 Aug 2023 04:46) (17 - 18)  SpO2: 97% (26 Aug 2023 04:46) (94% - 97%)    O2 Parameters below as of 26 Aug 2023 04:46  Patient On (Oxygen Delivery Method): room air                 Input and Output:  I&O's Detail    24 Aug 2023 07:01  -  25 Aug 2023 07:00  --------------------------------------------------------  IN:    Oral Fluid: 200 mL  Total IN: 200 mL    OUT:  Total OUT: 0 mL    Total NET: 200 mL          Lab Data                        14.3   18.89 )-----------( 532      ( 25 Aug 2023 06:28 )             42.2     08-25    124<L>  |  95<L>  |  51<H>  ----------------------------<  161<H>  5.3   |  16<L>  |  2.10<H>    Ca    8.7      25 Aug 2023 06:28  Phos  4.3     08-25  Mg     2.6     08-25    TPro  7.1  /  Alb  1.9<L>  /  TBili  0.6  /  DBili  x   /  AST  54<H>  /  ALT  36  /  AlkPhos  133<H>  08-25            Review of Systems	      Objective     Physical Examination    heart s1s2  lung dc BS  head nc      Pertinent Lab findings & Imaging      Austyn:  NO   Adequate UO     I&O's Detail    24 Aug 2023 07:01  -  25 Aug 2023 07:00  --------------------------------------------------------  IN:    Oral Fluid: 200 mL  Total IN: 200 mL    OUT:  Total OUT: 0 mL    Total NET: 200 mL               Discussed with:     Cultures:	        Radiology

## 2023-08-26 NOTE — CHART NOTE - NSCHARTNOTEFT_GEN_A_CORE
Patient is a 68y old  Male who presents with a chief complaint of Liver Cirrhosis with ascites, Liver cancer, hyponatremia (26 Aug 2023 08:55)      BRIEF HOSPITAL COURSE: 69 y/o Mele-speaking, visiting from [place], refused , Son Ang translated at bedside. Patient has history of EtOH misuse disorder, liver cirrhosis s/p esophageal varices banding, situs inversus, HF?, bradycardia, HTN, HLD, T2DM, seizure, GERD presents to ED due to subjective fever for the past 4-5 days with associated weakness, malaise, decreased PO intake, and abdominal bloating. Per family symptoms progressively worsening and yesterday patient developed LUQ pain, and describes pain as dull/cramping in nature, intermittent, non-radiating. Patient also notes one episode of bloody stool with a little bright red blood 2 days ago. Patient AAOx4 at baseline, full independent in ADL's, and denies recent unintentional weight loss, nausea, vomiting, urinary symptoms, or other symptoms at this time. Patient admitted on 8/21/23 for further evaluation.     Events last 24 hours: RRT called today for unresponsiveness per bedside nurse. Patient was completely awake and alert just prior, working with PT, on arrival patient found to be awake, but very lethargic, tracking with eyes, but non-verbal, unsure if syncope vs seizure, initial BP 80/48, unable to acquire repeat despite multiple attempts with Zoll and manual, SPO2 low 90s and place on NC O2,     PAST MEDICAL & SURGICAL HISTORY:  T2DM (type 2 diabetes mellitus)      History of seizure      History of bradycardia      GERD (gastroesophageal reflux disease)      History of cirrhosis of liver      HLD (hyperlipidemia)      HTN (hypertension)      H/O esophageal varices        Allergies    No Known Allergies    Intolerances      FAMILY HISTORY:  FH: type 2 diabetes (Mother)        Review of Systems:  CONSTITUTIONAL: No fever, chills, or fatigue  EYES: No eye pain, visual disturbances, or discharge  ENMT:  No difficulty hearing, tinnitus, vertigo; No sinus or throat pain  NECK: No pain or stiffness  RESPIRATORY: No cough, wheezing, chills or hemoptysis; No shortness of breath  CARDIOVASCULAR: No chest pain, palpitations, dizziness, or leg swelling  GASTROINTESTINAL: No abdominal or epigastric pain. No nausea, vomiting, or hematemesis; No diarrhea or constipation. No melena or hematochezia.  GENITOURINARY: No dysuria, frequency, hematuria, or incontinence  NEUROLOGICAL: No headaches, memory loss, loss of strength, numbness, or tremors  SKIN: No itching, burning, rashes, or lesions   MUSCULOSKELETAL: No joint pain or swelling; No muscle, back, or extremity pain  PSYCHIATRIC: No depression, anxiety, mood swings, or difficulty sleeping      Medications:  rifAXIMin 550 milliGRAM(s) Oral two times a day    metoprolol tartrate 25 milliGRAM(s) Oral two times a day  midodrine 2.5 milliGRAM(s) Oral <User Schedule>    albuterol/ipratropium for Nebulization 3 milliLiter(s) Nebulizer every 6 hours PRN    melatonin 3 milliGRAM(s) Oral at bedtime PRN  phenytoin   Capsule 100 milliGRAM(s) Oral daily      heparin   Injectable 5000 Unit(s) SubCutaneous every 12 hours    senna 2 Tablet(s) Oral at bedtime  simethicone 80 milliGRAM(s) Chew daily      dextrose 50% Injectable 25 Gram(s) IV Push once  dextrose 50% Injectable 50 milliLiter(s) IV Push once  dextrose 50% Injectable 12.5 Gram(s) IV Push once  dextrose 50% Injectable 25 Gram(s) IV Push once  glucagon  Injectable 1 milliGRAM(s) IntraMuscular once  insulin glargine Injectable (LANTUS) 15 Unit(s) SubCutaneous every morning  insulin lispro (ADMELOG) corrective regimen sliding scale   SubCutaneous three times a day before meals  insulin lispro (ADMELOG) corrective regimen sliding scale   SubCutaneous at bedtime  insulin lispro Injectable (ADMELOG) 6 Unit(s) SubCutaneous before lunch  insulin lispro Injectable (ADMELOG) 6 Unit(s) SubCutaneous before breakfast  insulin lispro Injectable (ADMELOG) 6 Unit(s) SubCutaneous before dinner  insulin regular  human recombinant 5 Unit(s) IV Push once    albumin human 25% IVPB 50 milliLiter(s) IV Intermittent once  dextrose 5%. 1000 milliLiter(s) IV Continuous <Continuous>  dextrose 5%. 1000 milliLiter(s) IV Continuous <Continuous>  sodium chloride 0.9% Bolus 500 milliLiter(s) IV Bolus once        sodium zirconium cyclosilicate 10 Gram(s) Oral once          ICU Vital Signs Last 24 Hrs  T(C): 36.7 (26 Aug 2023 04:46), Max: 36.7 (26 Aug 2023 04:46)  T(F): 98.1 (26 Aug 2023 04:46), Max: 98.1 (26 Aug 2023 04:46)  HR: 96 (26 Aug 2023 04:46) (83 - 105)  BP: 101/72 (26 Aug 2023 04:46) (85/64 - 120/89)  BP(mean): --  ABP: --  ABP(mean): --  RR: 17 (26 Aug 2023 04:46) (17 - 18)  SpO2: 97% (26 Aug 2023 04:46) (94% - 97%)    O2 Parameters below as of 26 Aug 2023 04:46  Patient On (Oxygen Delivery Method): room air          Vital Signs Last 24 Hrs  T(C): 36.7 (26 Aug 2023 04:46), Max: 36.7 (26 Aug 2023 04:46)  T(F): 98.1 (26 Aug 2023 04:46), Max: 98.1 (26 Aug 2023 04:46)  HR: 96 (26 Aug 2023 04:46) (83 - 105)  BP: 101/72 (26 Aug 2023 04:46) (85/64 - 120/89)  BP(mean): --  RR: 17 (26 Aug 2023 04:46) (17 - 18)  SpO2: 97% (26 Aug 2023 04:46) (94% - 97%)    Parameters below as of 26 Aug 2023 04:46  Patient On (Oxygen Delivery Method): room air            I&O's Detail        LABS:                        15.4   20.89 )-----------( 468      ( 26 Aug 2023 07:36 )             46.5     08-26    121<L>  |  94<L>  |  68<H>  ----------------------------<  138<H>  6.8<HH>   |  12<L>  |  2.70<H>    Ca    8.5      26 Aug 2023 07:36  Phos  4.3     08-25  Mg     2.6     08-25    TPro  7.5  /  Alb  1.9<L>  /  TBili  0.6  /  DBili  x   /  AST  761<H>  /  ALT  270<H>  /  AlkPhos  178<H>  08-26          CAPILLARY BLOOD GLUCOSE      POCT Blood Glucose.: 122 mg/dL (26 Aug 2023 10:37)      Urinalysis Basic - ( 26 Aug 2023 07:36 )    Color: x / Appearance: x / SG: x / pH: x  Gluc: 138 mg/dL / Ketone: x  / Bili: x / Urobili: x   Blood: x / Protein: x / Nitrite: x   Leuk Esterase: x / RBC: x / WBC x   Sq Epi: x / Non Sq Epi: x / Bacteria: x      CULTURES:  Culture Results:   No growth (08-23 @ 12:35)  Culture Results:   No growth (08-23 @ 09:50)      Physical Examination:    General: No acute distress.  Alert, oriented, interactive, nonfocal    HEENT: Pupils equal, reactive to light.  Symmetric.    PULM: Clear to auscultation bilaterally, no significant sputum production    CVS: Regular rate and rhythm, no murmurs, rubs, or gallops    ABD: Soft, nondistended, nontender, normoactive bowel sounds, no masses    EXT: No edema, nontender    SKIN: Warm and well perfused, no rashes noted.    RADIOLOGY: ***    CRITICAL CARE TIME SPENT: *** Patient is a 68y old  Male who presents with a chief complaint of Liver Cirrhosis with ascites, Liver cancer, hyponatremia (26 Aug 2023 08:55)      BRIEF HOSPITAL COURSE: 69 y/o Mele-speaking, visiting from [place], refused , Son Ang translated at bedside. Patient has history of EtOH misuse disorder, liver cirrhosis s/p esophageal varices banding, situs inversus, HF?, bradycardia, HTN, HLD, T2DM, seizure, GERD presents to ED due to subjective fever for the past 4-5 days with associated weakness, malaise, decreased PO intake, and abdominal bloating. Per family symptoms progressively worsening and yesterday patient developed LUQ pain, and describes pain as dull/cramping in nature, intermittent, non-radiating. Patient also notes one episode of bloody stool with a little bright red blood 2 days ago. Patient AAOx4 at baseline, full independent in ADL's, and denies recent unintentional weight loss, nausea, vomiting, urinary symptoms, or other symptoms at this time. Patient admitted on 8/21/23 for further evaluation.     Events last 24 hours: RRT called today for unresponsiveness per bedside nurse. Patient was completely awake and alert just prior, working with PT, on arrival patient found to be awake, but very lethargic, tracking with eyes, but non-verbal, unsure if syncope vs seizure, initial BP 80/48, unable to acquire repeat despite multiple attempts with Zoll and manual, SPO2 low 90s and place on NC O2, K+ 6.8, wbc 20, CTN 2.7<--2.1, and worsening  transaminitis. Patient admitted to ICU for further evaluation. Case discussed with Dr Rangel.     PAST MEDICAL & SURGICAL HISTORY:  T2DM (type 2 diabetes mellitus)      History of seizure      History of bradycardia      GERD (gastroesophageal reflux disease)      History of cirrhosis of liver      HLD (hyperlipidemia)      HTN (hypertension)      H/O esophageal varices        Allergies    No Known Allergies    Intolerances      FAMILY HISTORY:  FH: type 2 diabetes (Mother)        Review of Systems: Unable to obtain 2/2 patient's current mental status     Medications:  rifAXIMin 550 milliGRAM(s) Oral two times a day    metoprolol tartrate 25 milliGRAM(s) Oral two times a day  midodrine 2.5 milliGRAM(s) Oral <User Schedule>    albuterol/ipratropium for Nebulization 3 milliLiter(s) Nebulizer every 6 hours PRN    melatonin 3 milliGRAM(s) Oral at bedtime PRN  phenytoin   Capsule 100 milliGRAM(s) Oral daily      heparin   Injectable 5000 Unit(s) SubCutaneous every 12 hours    senna 2 Tablet(s) Oral at bedtime  simethicone 80 milliGRAM(s) Chew daily      dextrose 50% Injectable 25 Gram(s) IV Push once  dextrose 50% Injectable 50 milliLiter(s) IV Push once  dextrose 50% Injectable 12.5 Gram(s) IV Push once  dextrose 50% Injectable 25 Gram(s) IV Push once  glucagon  Injectable 1 milliGRAM(s) IntraMuscular once  insulin glargine Injectable (LANTUS) 15 Unit(s) SubCutaneous every morning  insulin lispro (ADMELOG) corrective regimen sliding scale   SubCutaneous three times a day before meals  insulin lispro (ADMELOG) corrective regimen sliding scale   SubCutaneous at bedtime  insulin lispro Injectable (ADMELOG) 6 Unit(s) SubCutaneous before lunch  insulin lispro Injectable (ADMELOG) 6 Unit(s) SubCutaneous before breakfast  insulin lispro Injectable (ADMELOG) 6 Unit(s) SubCutaneous before dinner  insulin regular  human recombinant 5 Unit(s) IV Push once    albumin human 25% IVPB 50 milliLiter(s) IV Intermittent once  dextrose 5%. 1000 milliLiter(s) IV Continuous <Continuous>  dextrose 5%. 1000 milliLiter(s) IV Continuous <Continuous>  sodium chloride 0.9% Bolus 500 milliLiter(s) IV Bolus once        sodium zirconium cyclosilicate 10 Gram(s) Oral once          ICU Vital Signs Last 24 Hrs  T(C): 36.7 (26 Aug 2023 04:46), Max: 36.7 (26 Aug 2023 04:46)  T(F): 98.1 (26 Aug 2023 04:46), Max: 98.1 (26 Aug 2023 04:46)  HR: 96 (26 Aug 2023 04:46) (83 - 105)  BP: 101/72 (26 Aug 2023 04:46) (85/64 - 120/89)  BP(mean): --  ABP: --  ABP(mean): --  RR: 17 (26 Aug 2023 04:46) (17 - 18)  SpO2: 97% (26 Aug 2023 04:46) (94% - 97%)    O2 Parameters below as of 26 Aug 2023 04:46  Patient On (Oxygen Delivery Method): room air          Vital Signs Last 24 Hrs  T(C): 36.7 (26 Aug 2023 04:46), Max: 36.7 (26 Aug 2023 04:46)  T(F): 98.1 (26 Aug 2023 04:46), Max: 98.1 (26 Aug 2023 04:46)  HR: 96 (26 Aug 2023 04:46) (83 - 105)  BP: 101/72 (26 Aug 2023 04:46) (85/64 - 120/89)  BP(mean): --  RR: 17 (26 Aug 2023 04:46) (17 - 18)  SpO2: 97% (26 Aug 2023 04:46) (94% - 97%)    Parameters below as of 26 Aug 2023 04:46  Patient On (Oxygen Delivery Method): room air            I&O's Detail        LABS:                        15.4   20.89 )-----------( 468      ( 26 Aug 2023 07:36 )             46.5     08-26    121<L>  |  94<L>  |  68<H>  ----------------------------<  138<H>  6.8<HH>   |  12<L>  |  2.70<H>    Ca    8.5      26 Aug 2023 07:36  Phos  4.3     08-25  Mg     2.6     08-25    TPro  7.5  /  Alb  1.9<L>  /  TBili  0.6  /  DBili  x   /  AST  761<H>  /  ALT  270<H>  /  AlkPhos  178<H>  08-26          CAPILLARY BLOOD GLUCOSE      POCT Blood Glucose.: 122 mg/dL (26 Aug 2023 10:37)      Urinalysis Basic - ( 26 Aug 2023 07:36 )    Color: x / Appearance: x / SG: x / pH: x  Gluc: 138 mg/dL / Ketone: x  / Bili: x / Urobili: x   Blood: x / Protein: x / Nitrite: x   Leuk Esterase: x / RBC: x / WBC x   Sq Epi: x / Non Sq Epi: x / Bacteria: x      CULTURES:  Culture Results:   No growth (08-23 @ 12:35)  Culture Results:   No growth (08-23 @ 09:50)      Physical Examination:    General: No acute distress. Dry mucous membranes     HEENT: Pupils equal, reactive to light.  Symmetric.    PULM: Decreased breath sounds B/L    CVS: Regular rate and rhythm, no murmurs, rubs, or gallops    ABD: Soft, distended, nontender, normoactive bowel sounds, no masses    EXT: No edema, nontender    SKIN: Warm and well perfused, no rashes noted.    NEURO: Lethargic, non-verbal, but awake     RADIOLOGY:   < from: CT Abdomen and Pelvis w/ IV Cont (08.21.23 @ 17:48) >    ACC: [id number] EXAM:  CT ABDOMEN AND PELVIS IC   ORDERED BY: IRIS REAVES     PROCEDURE DATE:  08/21/2023          INTERPRETATION:  CLINICAL INFORMATION: Left flank pain.    COMPARISON: None.    CONTRAST/COMPLICATIONS:  IV Contrast: Omnipaque 350  90 cc administered   10 cc discarded  Oral Contrast: NONE  Complications: None reported at time of study completion    PROCEDURE:  CT of the Abdomen and Pelvis was performed.  Sagittal and coronal reformats were performed.    FINDINGS:  LOWER CHEST: Total situs inversus.    LIVER: Cirrhosis. A segment 8/4 A exophytic peripherally enhancing   low-density mass extends superiorly to the diaphragm, measuring   approximately 5.2 x 4.1 cm. Additional 5 cm cyst low-density lesions that   are too small to characterize.  BILE DUCTS: Normal caliber.  GALLBLADDER: Within normal limits.  SPLEEN: Within normal limits.  PANCREAS: Within normal limits.  ADRENALS: Within normal limits.  KIDNEYS/URETERS: Within normal limits.    BLADDER: Within normal limits.  REPRODUCTIVE ORGANS: Mildly prominent prostate gland.    BOWEL: No bowel obstruction.  PERITONEUM: Moderate volume ascites with soft tissue implants diffusely   along the peritoneum as well as the serosal surface of the sigmoid colon.   A perisigmoid implant measures approximately 3.9 x 2.8 cm in greatest   transaxial dimension. Soft tissue implants are noted along the falciform   ligament. Omental nodularity is present.  VESSELS: Patent portal, superior mesenteric, and splenic veins. Diffuse   abdominal collaterals..  RETROPERITONEUM/LYMPH NODES: No lymphadenopathy.  ABDOMINAL WALL: Left inguinal hernia containing fluid and fat.  BONES: Degenerative changes.    IMPRESSION: Total situs inversus.  Cirrhosis with a dominant liver mass concerning for primary HCC versus   metastasis.    Peritoneal carcinomatosis and moderate volume ascites.    --- End of Report ---    RASHAUN BARRIENTOS MD; Attending Radiologist  This document has been electronically signed. Aug 21 2023  6:07PM    Impression - 69 y/o Mele-speaking, visiting from [place], refused , Son Ang translated at bedside. Patient has history of EtOH misuse disorder, liver cirrhosis s/p esophageal varices banding, situs inversus, HF?, bradycardia, HTN, HLD, T2DM, seizure, GERD presents to ED due to subjective fever for the past 4-5 days with associated weakness, malaise, decreased PO intake, and abdominal bloating.  RRT called today for unresponsiveness per bedside nurse. Patient was completely awake and alert just prior, working with PT, on arrival patient found to be awake, but very lethargic, tracking with eyes, but non-verbal, unsure if syncope vs seizure, initial BP 80/48, unable to acquire repeat despite multiple attempts with Zoll and manual, SPO2 low 90s and place on NC O2, K+ 6.8, wbc 20, CTN 2.7<--2.1, and worsening  transaminitis. Patient admitted to ICU for further evaluation. Case discussed with Dr Rangel.     Unresponsive / likely Hepatic encephalopathy  Shock   Chronic systolic HF   Decompensated cirrhosis likely 2/2 EtOH misuse  Ascites s/p paracentesis 8/24  Hepatic Ca w/mets - Peritoneal carcinomatosis  Hyperkalemia   MAIA   Hepatorenal syndrome          Critical Care time: 55 mins assessing presenting problems of acute illness that poses high probability of life threatening deterioration or end organ damage/dysfunction.  Medical decision making including Initiating plan of care, reviewing data, reviewing radiology, direct patient bedside evaluation and interpretation of vital signs, any necessary ventilator management, discussion with multidisciplinary team, discussing goals of care with patient/family, all non inclusive of procedures Patient is a 68y old  Male who presents with a chief complaint of Liver Cirrhosis with ascites, Liver cancer, hyponatremia (26 Aug 2023 08:55)      BRIEF HOSPITAL COURSE: 69 y/o Mele-speaking, visiting from [place], refused , Son Ang translated at bedside. Patient has history of EtOH misuse disorder, liver cirrhosis s/p esophageal varices banding, situs inversus, HF?, bradycardia, HTN, HLD, T2DM, seizure, GERD presents to ED due to subjective fever for the past 4-5 days with associated weakness, malaise, decreased PO intake, and abdominal bloating. Per family symptoms progressively worsening and yesterday patient developed LUQ pain, and describes pain as dull/cramping in nature, intermittent, non-radiating. Patient also notes one episode of bloody stool with a little bright red blood 2 days ago. Patient AAOx4 at baseline, full independent in ADL's, and denies recent unintentional weight loss, nausea, vomiting, urinary symptoms, or other symptoms at this time. Patient admitted on 8/21/23 for further evaluation.     Events last 24 hours: RRT called today for unresponsiveness per bedside nurse. Patient was completely awake and alert just prior, working with PT, on arrival patient found to be awake, but very lethargic, tracking with eyes, but non-verbal, unsure if syncope vs seizure, initial BP 80/48, unable to acquire repeat despite multiple attempts with Zoll and manual, SPO2 low 90s and place on NC O2, K+ 6.8, wbc 20, CTN 2.7<--2.1, and worsening  transaminitis. Patient admitted to ICU for further evaluation. Case discussed with Dr Rangel.     PAST MEDICAL & SURGICAL HISTORY:  T2DM (type 2 diabetes mellitus)      History of seizure      History of bradycardia      GERD (gastroesophageal reflux disease)      History of cirrhosis of liver      HLD (hyperlipidemia)      HTN (hypertension)      H/O esophageal varices        Allergies    No Known Allergies    Intolerances      FAMILY HISTORY:  FH: type 2 diabetes (Mother)        Review of Systems: Unable to obtain 2/2 patient's current mental status     Medications:  rifAXIMin 550 milliGRAM(s) Oral two times a day    metoprolol tartrate 25 milliGRAM(s) Oral two times a day  midodrine 2.5 milliGRAM(s) Oral <User Schedule>    albuterol/ipratropium for Nebulization 3 milliLiter(s) Nebulizer every 6 hours PRN    melatonin 3 milliGRAM(s) Oral at bedtime PRN  phenytoin   Capsule 100 milliGRAM(s) Oral daily      heparin   Injectable 5000 Unit(s) SubCutaneous every 12 hours    senna 2 Tablet(s) Oral at bedtime  simethicone 80 milliGRAM(s) Chew daily      dextrose 50% Injectable 25 Gram(s) IV Push once  dextrose 50% Injectable 50 milliLiter(s) IV Push once  dextrose 50% Injectable 12.5 Gram(s) IV Push once  dextrose 50% Injectable 25 Gram(s) IV Push once  glucagon  Injectable 1 milliGRAM(s) IntraMuscular once  insulin glargine Injectable (LANTUS) 15 Unit(s) SubCutaneous every morning  insulin lispro (ADMELOG) corrective regimen sliding scale   SubCutaneous three times a day before meals  insulin lispro (ADMELOG) corrective regimen sliding scale   SubCutaneous at bedtime  insulin lispro Injectable (ADMELOG) 6 Unit(s) SubCutaneous before lunch  insulin lispro Injectable (ADMELOG) 6 Unit(s) SubCutaneous before breakfast  insulin lispro Injectable (ADMELOG) 6 Unit(s) SubCutaneous before dinner  insulin regular  human recombinant 5 Unit(s) IV Push once    albumin human 25% IVPB 50 milliLiter(s) IV Intermittent once  dextrose 5%. 1000 milliLiter(s) IV Continuous <Continuous>  dextrose 5%. 1000 milliLiter(s) IV Continuous <Continuous>  sodium chloride 0.9% Bolus 500 milliLiter(s) IV Bolus once        sodium zirconium cyclosilicate 10 Gram(s) Oral once          ICU Vital Signs Last 24 Hrs  T(C): 36.7 (26 Aug 2023 04:46), Max: 36.7 (26 Aug 2023 04:46)  T(F): 98.1 (26 Aug 2023 04:46), Max: 98.1 (26 Aug 2023 04:46)  HR: 96 (26 Aug 2023 04:46) (83 - 105)  BP: 101/72 (26 Aug 2023 04:46) (85/64 - 120/89)  BP(mean): --  ABP: --  ABP(mean): --  RR: 17 (26 Aug 2023 04:46) (17 - 18)  SpO2: 97% (26 Aug 2023 04:46) (94% - 97%)    O2 Parameters below as of 26 Aug 2023 04:46  Patient On (Oxygen Delivery Method): room air          Vital Signs Last 24 Hrs  T(C): 36.7 (26 Aug 2023 04:46), Max: 36.7 (26 Aug 2023 04:46)  T(F): 98.1 (26 Aug 2023 04:46), Max: 98.1 (26 Aug 2023 04:46)  HR: 96 (26 Aug 2023 04:46) (83 - 105)  BP: 101/72 (26 Aug 2023 04:46) (85/64 - 120/89)  BP(mean): --  RR: 17 (26 Aug 2023 04:46) (17 - 18)  SpO2: 97% (26 Aug 2023 04:46) (94% - 97%)    Parameters below as of 26 Aug 2023 04:46  Patient On (Oxygen Delivery Method): room air            I&O's Detail        LABS:                        15.4   20.89 )-----------( 468      ( 26 Aug 2023 07:36 )             46.5     08-26    121<L>  |  94<L>  |  68<H>  ----------------------------<  138<H>  6.8<HH>   |  12<L>  |  2.70<H>    Ca    8.5      26 Aug 2023 07:36  Phos  4.3     08-25  Mg     2.6     08-25    TPro  7.5  /  Alb  1.9<L>  /  TBili  0.6  /  DBili  x   /  AST  761<H>  /  ALT  270<H>  /  AlkPhos  178<H>  08-26          CAPILLARY BLOOD GLUCOSE      POCT Blood Glucose.: 122 mg/dL (26 Aug 2023 10:37)      Urinalysis Basic - ( 26 Aug 2023 07:36 )    Color: x / Appearance: x / SG: x / pH: x  Gluc: 138 mg/dL / Ketone: x  / Bili: x / Urobili: x   Blood: x / Protein: x / Nitrite: x   Leuk Esterase: x / RBC: x / WBC x   Sq Epi: x / Non Sq Epi: x / Bacteria: x      CULTURES:  Culture Results:   No growth (08-23 @ 12:35)  Culture Results:   No growth (08-23 @ 09:50)      Physical Examination:    General: No acute distress. Dry mucous membranes     HEENT: Pupils equal, reactive to light.  Symmetric.    PULM: Decreased breath sounds B/L    CVS: Regular rate and rhythm, no murmurs, rubs, or gallops    ABD: Soft, distended, nontender, normoactive bowel sounds, no masses    EXT: No edema, nontender    SKIN: Warm and well perfused, no rashes noted.    NEURO: Lethargic, non-verbal, but awake     RADIOLOGY:   < from: CT Abdomen and Pelvis w/ IV Cont (08.21.23 @ 17:48) >    ACC: [id number] EXAM:  CT ABDOMEN AND PELVIS IC   ORDERED BY: IRIS REAVES     PROCEDURE DATE:  08/21/2023          INTERPRETATION:  CLINICAL INFORMATION: Left flank pain.    COMPARISON: None.    CONTRAST/COMPLICATIONS:  IV Contrast: Omnipaque 350  90 cc administered   10 cc discarded  Oral Contrast: NONE  Complications: None reported at time of study completion    PROCEDURE:  CT of the Abdomen and Pelvis was performed.  Sagittal and coronal reformats were performed.    FINDINGS:  LOWER CHEST: Total situs inversus.    LIVER: Cirrhosis. A segment 8/4 A exophytic peripherally enhancing   low-density mass extends superiorly to the diaphragm, measuring   approximately 5.2 x 4.1 cm. Additional 5 cm cyst low-density lesions that   are too small to characterize.  BILE DUCTS: Normal caliber.  GALLBLADDER: Within normal limits.  SPLEEN: Within normal limits.  PANCREAS: Within normal limits.  ADRENALS: Within normal limits.  KIDNEYS/URETERS: Within normal limits.    BLADDER: Within normal limits.  REPRODUCTIVE ORGANS: Mildly prominent prostate gland.    BOWEL: No bowel obstruction.  PERITONEUM: Moderate volume ascites with soft tissue implants diffusely   along the peritoneum as well as the serosal surface of the sigmoid colon.   A perisigmoid implant measures approximately 3.9 x 2.8 cm in greatest   transaxial dimension. Soft tissue implants are noted along the falciform   ligament. Omental nodularity is present.  VESSELS: Patent portal, superior mesenteric, and splenic veins. Diffuse   abdominal collaterals..  RETROPERITONEUM/LYMPH NODES: No lymphadenopathy.  ABDOMINAL WALL: Left inguinal hernia containing fluid and fat.  BONES: Degenerative changes.    IMPRESSION: Total situs inversus.  Cirrhosis with a dominant liver mass concerning for primary HCC versus   metastasis.    Peritoneal carcinomatosis and moderate volume ascites.    --- End of Report ---    RASHAUN BARRIENTOS MD; Attending Radiologist  This document has been electronically signed. Aug 21 2023  6:07PM    Impression - 69 y/o Mele-speaking, visiting from [place], refused , Son Ang translated at bedside. Patient has history of EtOH misuse disorder, liver cirrhosis s/p esophageal varices banding, situs inversus, HF?, bradycardia, HTN, HLD, T2DM, seizure, GERD presents to ED due to subjective fever for the past 4-5 days with associated weakness, malaise, decreased PO intake, and abdominal bloating.  RRT called today for unresponsiveness per bedside nurse. Patient was completely awake and alert just prior, working with PT, on arrival patient found to be awake, but very lethargic, tracking with eyes, but non-verbal, unsure if syncope vs seizure, initial BP 80/48, unable to acquire repeat despite multiple attempts with Zoll and manual, SPO2 low 90s and place on NC O2, K+ 6.8, wbc 20, CTN 2.7<--2.1, and worsening  transaminitis. Patient admitted to ICU for further evaluation. Case discussed with Dr Rangel.     Unresponsive / likely Hepatic encephalopathy  Distributive shock   Chronic systolic HF   Decompensated cirrhosis likely 2/2 EtOH misuse  Ascites s/p paracentesis 8/24  HCC / Peritoneal carcinomatosis  Hepatorenal syndrome   Hyperkalemia   MAIA      Neuro - Mentation improving post Levophed infusion, now more awake and alert, tracking, and attempting to answer questions  CV - Actively titrating Levo gtt for MAP>65, add Vaso, increase Midodrine dose to 10mg TID, official TTE results pending, Cardiology following and input appreciated   Pulm - SPO2 high 90s on NC O2, no signs of gross effusions or B-lines on POCUS   GI - Decompensated cirrhosis, ascites s/p paracentesis on 8/24, 4L removed, remains with abdominal distension, continue Rifaximin, LFTs up-trending, avoid hepatotoxic agents, liver mass - Primary HCC vs mets, MELD score 26, but patient is not a candidate for transplantation 2/2 Visa status,            Critical Care time: 55 mins assessing presenting problems of acute illness that poses high probability of life threatening deterioration or end organ damage/dysfunction.  Medical decision making including Initiating plan of care, reviewing data, reviewing radiology, direct patient bedside evaluation and interpretation of vital signs, any necessary ventilator management, discussion with multidisciplinary team, discussing goals of care with patient/family, all non inclusive of procedures Patient is a 68y old  Male who presents with a chief complaint of Liver Cirrhosis with ascites, Liver cancer, hyponatremia (26 Aug 2023 08:55)      BRIEF HOSPITAL COURSE: 69 y/o Mele-speaking, visiting from [place], refused , Son Ang translated at bedside. Patient has history of EtOH misuse disorder, liver cirrhosis s/p esophageal varices banding, situs inversus, HF?, bradycardia, HTN, HLD, T2DM, seizure, GERD presents to ED due to subjective fever for the past 4-5 days with associated weakness, malaise, decreased PO intake, and abdominal bloating. Per family symptoms progressively worsening and yesterday patient developed LUQ pain, and describes pain as dull/cramping in nature, intermittent, non-radiating. Patient also notes one episode of bloody stool with a little bright red blood 2 days ago. Patient AAOx4 at baseline, full independent in ADL's, and denies recent unintentional weight loss, nausea, vomiting, urinary symptoms, or other symptoms at this time. Patient admitted on 8/21/23 for further evaluation.     Events last 24 hours: RRT called today for unresponsiveness per bedside nurse. Patient was completely awake and alert just prior, working with PT, on arrival patient found to be awake, but very lethargic, tracking with eyes, but non-verbal, unsure if syncope vs seizure, initial BP 80/48, unable to acquire repeat despite multiple attempts with Zoll and manual, SPO2 low 90s and place on NC O2, K+ 6.8, wbc 20, CTN 2.7<--2.1, and worsening  transaminitis. Patient admitted to ICU for further evaluation. Case discussed with Dr Rangel.     PAST MEDICAL & SURGICAL HISTORY:  T2DM (type 2 diabetes mellitus)      History of seizure      History of bradycardia      GERD (gastroesophageal reflux disease)      History of cirrhosis of liver      HLD (hyperlipidemia)      HTN (hypertension)      H/O esophageal varices        Allergies    No Known Allergies    Intolerances      FAMILY HISTORY:  FH: type 2 diabetes (Mother)        Review of Systems: Unable to obtain 2/2 patient's current mental status     Medications:  rifAXIMin 550 milliGRAM(s) Oral two times a day    metoprolol tartrate 25 milliGRAM(s) Oral two times a day  midodrine 2.5 milliGRAM(s) Oral <User Schedule>    albuterol/ipratropium for Nebulization 3 milliLiter(s) Nebulizer every 6 hours PRN    melatonin 3 milliGRAM(s) Oral at bedtime PRN  phenytoin   Capsule 100 milliGRAM(s) Oral daily      heparin   Injectable 5000 Unit(s) SubCutaneous every 12 hours    senna 2 Tablet(s) Oral at bedtime  simethicone 80 milliGRAM(s) Chew daily      dextrose 50% Injectable 25 Gram(s) IV Push once  dextrose 50% Injectable 50 milliLiter(s) IV Push once  dextrose 50% Injectable 12.5 Gram(s) IV Push once  dextrose 50% Injectable 25 Gram(s) IV Push once  glucagon  Injectable 1 milliGRAM(s) IntraMuscular once  insulin glargine Injectable (LANTUS) 15 Unit(s) SubCutaneous every morning  insulin lispro (ADMELOG) corrective regimen sliding scale   SubCutaneous three times a day before meals  insulin lispro (ADMELOG) corrective regimen sliding scale   SubCutaneous at bedtime  insulin lispro Injectable (ADMELOG) 6 Unit(s) SubCutaneous before lunch  insulin lispro Injectable (ADMELOG) 6 Unit(s) SubCutaneous before breakfast  insulin lispro Injectable (ADMELOG) 6 Unit(s) SubCutaneous before dinner  insulin regular  human recombinant 5 Unit(s) IV Push once    albumin human 25% IVPB 50 milliLiter(s) IV Intermittent once  dextrose 5%. 1000 milliLiter(s) IV Continuous <Continuous>  dextrose 5%. 1000 milliLiter(s) IV Continuous <Continuous>  sodium chloride 0.9% Bolus 500 milliLiter(s) IV Bolus once        sodium zirconium cyclosilicate 10 Gram(s) Oral once          ICU Vital Signs Last 24 Hrs  T(C): 36.7 (26 Aug 2023 04:46), Max: 36.7 (26 Aug 2023 04:46)  T(F): 98.1 (26 Aug 2023 04:46), Max: 98.1 (26 Aug 2023 04:46)  HR: 96 (26 Aug 2023 04:46) (83 - 105)  BP: 101/72 (26 Aug 2023 04:46) (85/64 - 120/89)  BP(mean): --  ABP: --  ABP(mean): --  RR: 17 (26 Aug 2023 04:46) (17 - 18)  SpO2: 97% (26 Aug 2023 04:46) (94% - 97%)    O2 Parameters below as of 26 Aug 2023 04:46  Patient On (Oxygen Delivery Method): room air          Vital Signs Last 24 Hrs  T(C): 36.7 (26 Aug 2023 04:46), Max: 36.7 (26 Aug 2023 04:46)  T(F): 98.1 (26 Aug 2023 04:46), Max: 98.1 (26 Aug 2023 04:46)  HR: 96 (26 Aug 2023 04:46) (83 - 105)  BP: 101/72 (26 Aug 2023 04:46) (85/64 - 120/89)  BP(mean): --  RR: 17 (26 Aug 2023 04:46) (17 - 18)  SpO2: 97% (26 Aug 2023 04:46) (94% - 97%)    Parameters below as of 26 Aug 2023 04:46  Patient On (Oxygen Delivery Method): room air            I&O's Detail        LABS:                        15.4   20.89 )-----------( 468      ( 26 Aug 2023 07:36 )             46.5     08-26    121<L>  |  94<L>  |  68<H>  ----------------------------<  138<H>  6.8<HH>   |  12<L>  |  2.70<H>    Ca    8.5      26 Aug 2023 07:36  Phos  4.3     08-25  Mg     2.6     08-25    TPro  7.5  /  Alb  1.9<L>  /  TBili  0.6  /  DBili  x   /  AST  761<H>  /  ALT  270<H>  /  AlkPhos  178<H>  08-26          CAPILLARY BLOOD GLUCOSE      POCT Blood Glucose.: 122 mg/dL (26 Aug 2023 10:37)      Urinalysis Basic - ( 26 Aug 2023 07:36 )    Color: x / Appearance: x / SG: x / pH: x  Gluc: 138 mg/dL / Ketone: x  / Bili: x / Urobili: x   Blood: x / Protein: x / Nitrite: x   Leuk Esterase: x / RBC: x / WBC x   Sq Epi: x / Non Sq Epi: x / Bacteria: x      CULTURES:  Culture Results:   No growth (08-23 @ 12:35)  Culture Results:   No growth (08-23 @ 09:50)      Physical Examination:    General: No acute distress. Dry mucous membranes     HEENT: Pupils equal, reactive to light.  Symmetric.    PULM: Decreased breath sounds B/L    CVS: Regular rate and rhythm, no murmurs, rubs, or gallops    ABD: Soft, distended, nontender, normoactive bowel sounds, no masses    EXT: No edema, nontender    SKIN: Warm and well perfused, no rashes noted.    NEURO: Lethargic, non-verbal, but awake     RADIOLOGY:   < from: CT Abdomen and Pelvis w/ IV Cont (08.21.23 @ 17:48) >    ACC: [id number] EXAM:  CT ABDOMEN AND PELVIS IC   ORDERED BY: IRIS REAVES     PROCEDURE DATE:  08/21/2023          INTERPRETATION:  CLINICAL INFORMATION: Left flank pain.    COMPARISON: None.    CONTRAST/COMPLICATIONS:  IV Contrast: Omnipaque 350  90 cc administered   10 cc discarded  Oral Contrast: NONE  Complications: None reported at time of study completion    PROCEDURE:  CT of the Abdomen and Pelvis was performed.  Sagittal and coronal reformats were performed.    FINDINGS:  LOWER CHEST: Total situs inversus.    LIVER: Cirrhosis. A segment 8/4 A exophytic peripherally enhancing   low-density mass extends superiorly to the diaphragm, measuring   approximately 5.2 x 4.1 cm. Additional 5 cm cyst low-density lesions that   are too small to characterize.  BILE DUCTS: Normal caliber.  GALLBLADDER: Within normal limits.  SPLEEN: Within normal limits.  PANCREAS: Within normal limits.  ADRENALS: Within normal limits.  KIDNEYS/URETERS: Within normal limits.    BLADDER: Within normal limits.  REPRODUCTIVE ORGANS: Mildly prominent prostate gland.    BOWEL: No bowel obstruction.  PERITONEUM: Moderate volume ascites with soft tissue implants diffusely   along the peritoneum as well as the serosal surface of the sigmoid colon.   A perisigmoid implant measures approximately 3.9 x 2.8 cm in greatest   transaxial dimension. Soft tissue implants are noted along the falciform   ligament. Omental nodularity is present.  VESSELS: Patent portal, superior mesenteric, and splenic veins. Diffuse   abdominal collaterals..  RETROPERITONEUM/LYMPH NODES: No lymphadenopathy.  ABDOMINAL WALL: Left inguinal hernia containing fluid and fat.  BONES: Degenerative changes.    IMPRESSION: Total situs inversus.  Cirrhosis with a dominant liver mass concerning for primary HCC versus   metastasis.    Peritoneal carcinomatosis and moderate volume ascites.    --- End of Report ---    RASHAUN BARRIENTOS MD; Attending Radiologist  This document has been electronically signed. Aug 21 2023  6:07PM    Impression - 69 y/o Mele-speaking, visiting from [place], refused , Son Ang translated at bedside. Patient has history of EtOH misuse disorder, liver cirrhosis s/p esophageal varices banding, situs inversus, HF?, bradycardia, HTN, HLD, T2DM, seizure, GERD presents to ED due to subjective fever for the past 4-5 days with associated weakness, malaise, decreased PO intake, and abdominal bloating.  RRT called today for unresponsiveness per bedside nurse. Patient was completely awake and alert just prior, working with PT, on arrival patient found to be awake, but very lethargic, tracking with eyes, but non-verbal, unsure if syncope vs seizure, initial BP 80/48, unable to acquire repeat despite multiple attempts with Zoll and manual, SPO2 low 90s and place on NC O2, K+ 6.8, wbc 20, CTN 2.7<--2.1, and worsening  transaminitis. Patient admitted to ICU for further evaluation. Case discussed with Dr Rangel.     Unresponsive / likely Hepatic encephalopathy  Distributive shock   Chronic systolic HF   Decompensated cirrhosis likely 2/2 EtOH misuse  Ascites s/p paracentesis 8/24  HCC / Peritoneal carcinomatosis  Hepatorenal syndrome   Hyperkalemia   MAIA      Neuro - Mentation improving post Levophed infusion, now more awake and alert, tracking, and attempting to answer questions, likely encephalopathic 2/2 to low-flow state   CV - Actively titrating Levo gtt for MAP>65, add Vaso, increase Midodrine dose to 10mg TID, official TTE results pending, Cardiology following and input appreciated   Pulm - SPO2 high 90s on NC O2, no signs of gross effusions or B-lines on POCUS   GI - Decompensated cirrhosis, ascites s/p paracentesis on 8/24, 4L removed, remains with abdominal distension, continue Rifaximin, worsening LFTs, avoid hepatotoxic agents, liver mass - Primary HCC vs mets, MELD score 26, but patient is not a candidate for transplantation 2/2 Visa status, GI following and input appreciated   Renal - MAIA w/up-trending CTN, likely hepatorenal post paracentesis, hyper K+ protocol given, no TW abnormalities on monitor, repeat labs pending, HCO3 bolus x 1 given, +echavarria for strict I/Os, add Albumin Q6, renally dose all meds, avoid nephrotoxins  Endocrine - ISS protocol plus standing Lantus, goal -180  Heme - Hgb 15 likely hemoconcentrated, no s/s of active bleeding, DVT PPx w/UFH Q12            Critical Care time: 55 mins assessing presenting problems of acute illness that poses high probability of life threatening deterioration or end organ damage/dysfunction.  Medical decision making including Initiating plan of care, reviewing data, reviewing radiology, direct patient bedside evaluation and interpretation of vital signs, any necessary ventilator management, discussion with multidisciplinary team, discussing goals of care with patient/family, all non inclusive of procedures Patient is a 68y old  Male who presents with a chief complaint of Liver Cirrhosis with ascites, Liver cancer, hyponatremia (26 Aug 2023 08:55)      BRIEF HOSPITAL COURSE: 67 y/o Mele-speaking, visiting from [place], refused , Son Ang translated at bedside. Patient has history of EtOH misuse disorder, liver cirrhosis s/p esophageal varices banding, situs inversus, HF?, bradycardia, HTN, HLD, T2DM, seizure, GERD presents to ED due to subjective fever for the past 4-5 days with associated weakness, malaise, decreased PO intake, and abdominal bloating. Per family symptoms progressively worsening and yesterday patient developed LUQ pain, and describes pain as dull/cramping in nature, intermittent, non-radiating. Patient also notes one episode of bloody stool with a little bright red blood 2 days ago. Patient AAOx4 at baseline, full independent in ADL's, and denies recent unintentional weight loss, nausea, vomiting, urinary symptoms, or other symptoms at this time. Patient admitted on 8/21/23 for further evaluation.     Events last 24 hours: RRT called today for unresponsiveness per bedside nurse. Patient was completely awake and alert just prior, working with PT, on arrival patient found to be awake, but very lethargic, tracking with eyes, but non-verbal, unsure if syncope vs seizure, initial BP 80/48, unable to acquire repeat despite multiple attempts with Zoll and manual, SPO2 low 90s and place on NC O2, K+ 6.8, wbc 20, CTN 2.7<--2.1, and worsening  transaminitis. Patient admitted to ICU for further evaluation. Case discussed with Dr Rangel.     PAST MEDICAL & SURGICAL HISTORY:  T2DM (type 2 diabetes mellitus)      History of seizure      History of bradycardia      GERD (gastroesophageal reflux disease)      History of cirrhosis of liver      HLD (hyperlipidemia)      HTN (hypertension)      H/O esophageal varices        Allergies    No Known Allergies    Intolerances      FAMILY HISTORY:  FH: type 2 diabetes (Mother)        Review of Systems: Unable to obtain 2/2 patient's current mental status     Medications:  rifAXIMin 550 milliGRAM(s) Oral two times a day    metoprolol tartrate 25 milliGRAM(s) Oral two times a day  midodrine 2.5 milliGRAM(s) Oral <User Schedule>    albuterol/ipratropium for Nebulization 3 milliLiter(s) Nebulizer every 6 hours PRN    melatonin 3 milliGRAM(s) Oral at bedtime PRN  phenytoin   Capsule 100 milliGRAM(s) Oral daily      heparin   Injectable 5000 Unit(s) SubCutaneous every 12 hours    senna 2 Tablet(s) Oral at bedtime  simethicone 80 milliGRAM(s) Chew daily      dextrose 50% Injectable 25 Gram(s) IV Push once  dextrose 50% Injectable 50 milliLiter(s) IV Push once  dextrose 50% Injectable 12.5 Gram(s) IV Push once  dextrose 50% Injectable 25 Gram(s) IV Push once  glucagon  Injectable 1 milliGRAM(s) IntraMuscular once  insulin glargine Injectable (LANTUS) 15 Unit(s) SubCutaneous every morning  insulin lispro (ADMELOG) corrective regimen sliding scale   SubCutaneous three times a day before meals  insulin lispro (ADMELOG) corrective regimen sliding scale   SubCutaneous at bedtime  insulin lispro Injectable (ADMELOG) 6 Unit(s) SubCutaneous before lunch  insulin lispro Injectable (ADMELOG) 6 Unit(s) SubCutaneous before breakfast  insulin lispro Injectable (ADMELOG) 6 Unit(s) SubCutaneous before dinner  insulin regular  human recombinant 5 Unit(s) IV Push once    albumin human 25% IVPB 50 milliLiter(s) IV Intermittent once  dextrose 5%. 1000 milliLiter(s) IV Continuous <Continuous>  dextrose 5%. 1000 milliLiter(s) IV Continuous <Continuous>  sodium chloride 0.9% Bolus 500 milliLiter(s) IV Bolus once        sodium zirconium cyclosilicate 10 Gram(s) Oral once          ICU Vital Signs Last 24 Hrs  T(C): 36.7 (26 Aug 2023 04:46), Max: 36.7 (26 Aug 2023 04:46)  T(F): 98.1 (26 Aug 2023 04:46), Max: 98.1 (26 Aug 2023 04:46)  HR: 96 (26 Aug 2023 04:46) (83 - 105)  BP: 101/72 (26 Aug 2023 04:46) (85/64 - 120/89)  BP(mean): --  ABP: --  ABP(mean): --  RR: 17 (26 Aug 2023 04:46) (17 - 18)  SpO2: 97% (26 Aug 2023 04:46) (94% - 97%)    O2 Parameters below as of 26 Aug 2023 04:46  Patient On (Oxygen Delivery Method): room air          Vital Signs Last 24 Hrs  T(C): 36.7 (26 Aug 2023 04:46), Max: 36.7 (26 Aug 2023 04:46)  T(F): 98.1 (26 Aug 2023 04:46), Max: 98.1 (26 Aug 2023 04:46)  HR: 96 (26 Aug 2023 04:46) (83 - 105)  BP: 101/72 (26 Aug 2023 04:46) (85/64 - 120/89)  BP(mean): --  RR: 17 (26 Aug 2023 04:46) (17 - 18)  SpO2: 97% (26 Aug 2023 04:46) (94% - 97%)    Parameters below as of 26 Aug 2023 04:46  Patient On (Oxygen Delivery Method): room air            I&O's Detail        LABS:                        15.4   20.89 )-----------( 468      ( 26 Aug 2023 07:36 )             46.5     08-26    121<L>  |  94<L>  |  68<H>  ----------------------------<  138<H>  6.8<HH>   |  12<L>  |  2.70<H>    Ca    8.5      26 Aug 2023 07:36  Phos  4.3     08-25  Mg     2.6     08-25    TPro  7.5  /  Alb  1.9<L>  /  TBili  0.6  /  DBili  x   /  AST  761<H>  /  ALT  270<H>  /  AlkPhos  178<H>  08-26          CAPILLARY BLOOD GLUCOSE      POCT Blood Glucose.: 122 mg/dL (26 Aug 2023 10:37)      Urinalysis Basic - ( 26 Aug 2023 07:36 )    Color: x / Appearance: x / SG: x / pH: x  Gluc: 138 mg/dL / Ketone: x  / Bili: x / Urobili: x   Blood: x / Protein: x / Nitrite: x   Leuk Esterase: x / RBC: x / WBC x   Sq Epi: x / Non Sq Epi: x / Bacteria: x      CULTURES:  Culture Results:   No growth (08-23 @ 12:35)  Culture Results:   No growth (08-23 @ 09:50)      Physical Examination:    General: No acute distress. Dry mucous membranes     HEENT: Pupils equal, reactive to light.  Symmetric.    PULM: Decreased breath sounds B/L    CVS: Regular rate and rhythm, no murmurs, rubs, or gallops    ABD: Soft, distended, nontender, normoactive bowel sounds, no masses    EXT: No edema, nontender    SKIN: Warm and well perfused, no rashes noted.    NEURO: Lethargic, non-verbal, but awake     RADIOLOGY:   < from: CT Abdomen and Pelvis w/ IV Cont (08.21.23 @ 17:48) >    ACC: [id number] EXAM:  CT ABDOMEN AND PELVIS IC   ORDERED BY: IRIS REAVES     PROCEDURE DATE:  08/21/2023          INTERPRETATION:  CLINICAL INFORMATION: Left flank pain.    COMPARISON: None.    CONTRAST/COMPLICATIONS:  IV Contrast: Omnipaque 350  90 cc administered   10 cc discarded  Oral Contrast: NONE  Complications: None reported at time of study completion    PROCEDURE:  CT of the Abdomen and Pelvis was performed.  Sagittal and coronal reformats were performed.    FINDINGS:  LOWER CHEST: Total situs inversus.    LIVER: Cirrhosis. A segment 8/4 A exophytic peripherally enhancing   low-density mass extends superiorly to the diaphragm, measuring   approximately 5.2 x 4.1 cm. Additional 5 cm cyst low-density lesions that   are too small to characterize.  BILE DUCTS: Normal caliber.  GALLBLADDER: Within normal limits.  SPLEEN: Within normal limits.  PANCREAS: Within normal limits.  ADRENALS: Within normal limits.  KIDNEYS/URETERS: Within normal limits.    BLADDER: Within normal limits.  REPRODUCTIVE ORGANS: Mildly prominent prostate gland.    BOWEL: No bowel obstruction.  PERITONEUM: Moderate volume ascites with soft tissue implants diffusely   along the peritoneum as well as the serosal surface of the sigmoid colon.   A perisigmoid implant measures approximately 3.9 x 2.8 cm in greatest   transaxial dimension. Soft tissue implants are noted along the falciform   ligament. Omental nodularity is present.  VESSELS: Patent portal, superior mesenteric, and splenic veins. Diffuse   abdominal collaterals..  RETROPERITONEUM/LYMPH NODES: No lymphadenopathy.  ABDOMINAL WALL: Left inguinal hernia containing fluid and fat.  BONES: Degenerative changes.    IMPRESSION: Total situs inversus.  Cirrhosis with a dominant liver mass concerning for primary HCC versus   metastasis.    Peritoneal carcinomatosis and moderate volume ascites.    --- End of Report ---    RASHAUN BARRIENTOS MD; Attending Radiologist  This document has been electronically signed. Aug 21 2023  6:07PM    Impression - 67 y/o Mele-speaking, visiting from [place], refused , Son Ang translated at bedside. Patient has history of EtOH misuse disorder, liver cirrhosis s/p esophageal varices banding, situs inversus, HF?, bradycardia, HTN, HLD, T2DM, seizure, GERD presents to ED due to subjective fever for the past 4-5 days with associated weakness, malaise, decreased PO intake, and abdominal bloating.  RRT called today for unresponsiveness per bedside nurse. Patient was completely awake and alert just prior, working with PT, on arrival patient found to be awake, but very lethargic, tracking with eyes, but non-verbal, unsure if syncope vs seizure, initial BP 80/48, unable to acquire repeat despite multiple attempts with Zoll and manual, SPO2 low 90s and place on NC O2, K+ 6.8, wbc 20, CTN 2.7<--2.1, and worsening  transaminitis. Patient admitted to ICU for further evaluation. Case discussed with Dr Rangel.     Unresponsive / likely Hepatic encephalopathy  Distributive shock   Chronic systolic HF   Decompensated cirrhosis likely 2/2 EtOH misuse  Ascites s/p paracentesis 8/24 - Culture negative   HCC / Peritoneal carcinomatosis  Hepatorenal syndrome   Hyperkalemia   MAIA      Neuro - Mentation improving post Levophed infusion, now more awake and alert, tracking, and attempting to answer questions, likely encephalopathic 2/2 to low-flow state   CV - Actively titrating Levo gtt for MAP>65, add Vaso, increase Midodrine dose to 10mg TID, HOLD GDMT in setting of hypotension, official TTE results pending for baseline EF and possible valvulopathies, Cardiology following and input appreciated   Pulm - SPO2 high 90s on NC O2, no signs of gross effusions or B-lines on POCUS   GI - Decompensated cirrhosis, ascites s/p paracentesis on 8/24, 4L removed, remains with abdominal distension, continue Rifaximin, worsening LFTs likely multifactorial, avoid hepatotoxic agents, liver mass - Primary HCC vs mets, MELD score 26, but patient is not a candidate for transplantation 2/2 Visa status, GI following and input appreciated   Renal - MAIA w/up-trending CTN, likely hepatorenal post paracentesis, hyper K+ protocol given x 1, no TW abnormalities on monitor, repeat STAT labs pending, HCO3 bolus x 1 given, +echavarria for strict I/Os, add Albumin Q6, renally dose all meds, avoid nephrotoxins  Endocrine - A1c 9.4, ISS protocol plus standing Lantus, goal -180  Heme - Hgb 15 likely hemoconcentrated, no s/s of active bleeding, DVT PPx w/UFH Q12   ID - WBC > 20, but afebrile x 24 hours, possibly reactive, ADD Zosyn ATC Q8 for empiric coverage, BCx pending collection, Ascites / Peritoneal culture negative, ID following - Rocephin course completed yesterday         Critical Care time: 55 mins assessing presenting problems of acute illness that poses high probability of life threatening deterioration or end organ damage/dysfunction.  Medical decision making including Initiating plan of care, reviewing data, reviewing radiology, direct patient bedside evaluation and interpretation of vital signs, any necessary ventilator management, discussion with multidisciplinary team, discussing goals of care with patient/family, all non inclusive of procedures Patient is a 68y old  Male who presents with a chief complaint of Liver Cirrhosis with ascites, Liver cancer, hyponatremia (26 Aug 2023 08:55)      BRIEF HOSPITAL COURSE: 69 y/o Mele-speaking, visiting from [place], refused , Son Ang translated at bedside. Patient has history of EtOH misuse disorder, liver cirrhosis s/p esophageal varices banding, situs inversus, HF?, bradycardia, HTN, HLD, T2DM, seizure, GERD presents to ED due to subjective fever for the past 4-5 days with associated weakness, malaise, decreased PO intake, and abdominal bloating. Per family symptoms progressively worsening and yesterday patient developed LUQ pain, and describes pain as dull/cramping in nature, intermittent, non-radiating. Patient also notes one episode of bloody stool with a little bright red blood 2 days ago. Patient AAOx4 at baseline, full independent in ADL's, and denies recent unintentional weight loss, nausea, vomiting, urinary symptoms, or other symptoms at this time. Patient admitted on 8/21/23 for further evaluation.     Events last 24 hours: RRT called today for unresponsiveness per bedside nurse. Patient was completely awake and alert just prior, working with PT, on arrival patient found to be awake, but very lethargic, tracking with eyes, but non-verbal, unsure if syncope vs seizure, initial BP 80/48, unable to acquire repeat despite multiple attempts with Zoll and manual, SPO2 low 90s and place on NC O2, K+ 6.8, wbc 20, CTN 2.7<--2.1, and worsening  transaminitis. Patient admitted to ICU for further evaluation. Case discussed with Dr Rangel.     PAST MEDICAL & SURGICAL HISTORY:  T2DM (type 2 diabetes mellitus)      History of seizure      History of bradycardia      GERD (gastroesophageal reflux disease)      History of cirrhosis of liver      HLD (hyperlipidemia)      HTN (hypertension)      H/O esophageal varices        Allergies    No Known Allergies    Intolerances      FAMILY HISTORY:  FH: type 2 diabetes (Mother)        Review of Systems: Unable to obtain 2/2 patient's current mental status     Medications:  rifAXIMin 550 milliGRAM(s) Oral two times a day    metoprolol tartrate 25 milliGRAM(s) Oral two times a day  midodrine 2.5 milliGRAM(s) Oral <User Schedule>    albuterol/ipratropium for Nebulization 3 milliLiter(s) Nebulizer every 6 hours PRN    melatonin 3 milliGRAM(s) Oral at bedtime PRN  phenytoin   Capsule 100 milliGRAM(s) Oral daily      heparin   Injectable 5000 Unit(s) SubCutaneous every 12 hours    senna 2 Tablet(s) Oral at bedtime  simethicone 80 milliGRAM(s) Chew daily      dextrose 50% Injectable 25 Gram(s) IV Push once  dextrose 50% Injectable 50 milliLiter(s) IV Push once  dextrose 50% Injectable 12.5 Gram(s) IV Push once  dextrose 50% Injectable 25 Gram(s) IV Push once  glucagon  Injectable 1 milliGRAM(s) IntraMuscular once  insulin glargine Injectable (LANTUS) 15 Unit(s) SubCutaneous every morning  insulin lispro (ADMELOG) corrective regimen sliding scale   SubCutaneous three times a day before meals  insulin lispro (ADMELOG) corrective regimen sliding scale   SubCutaneous at bedtime  insulin lispro Injectable (ADMELOG) 6 Unit(s) SubCutaneous before lunch  insulin lispro Injectable (ADMELOG) 6 Unit(s) SubCutaneous before breakfast  insulin lispro Injectable (ADMELOG) 6 Unit(s) SubCutaneous before dinner  insulin regular  human recombinant 5 Unit(s) IV Push once    albumin human 25% IVPB 50 milliLiter(s) IV Intermittent once  dextrose 5%. 1000 milliLiter(s) IV Continuous <Continuous>  dextrose 5%. 1000 milliLiter(s) IV Continuous <Continuous>  sodium chloride 0.9% Bolus 500 milliLiter(s) IV Bolus once        sodium zirconium cyclosilicate 10 Gram(s) Oral once          ICU Vital Signs Last 24 Hrs  T(C): 36.7 (26 Aug 2023 04:46), Max: 36.7 (26 Aug 2023 04:46)  T(F): 98.1 (26 Aug 2023 04:46), Max: 98.1 (26 Aug 2023 04:46)  HR: 96 (26 Aug 2023 04:46) (83 - 105)  BP: 101/72 (26 Aug 2023 04:46) (85/64 - 120/89)  BP(mean): --  ABP: --  ABP(mean): --  RR: 17 (26 Aug 2023 04:46) (17 - 18)  SpO2: 97% (26 Aug 2023 04:46) (94% - 97%)    O2 Parameters below as of 26 Aug 2023 04:46  Patient On (Oxygen Delivery Method): room air          Vital Signs Last 24 Hrs  T(C): 36.7 (26 Aug 2023 04:46), Max: 36.7 (26 Aug 2023 04:46)  T(F): 98.1 (26 Aug 2023 04:46), Max: 98.1 (26 Aug 2023 04:46)  HR: 96 (26 Aug 2023 04:46) (83 - 105)  BP: 101/72 (26 Aug 2023 04:46) (85/64 - 120/89)  BP(mean): --  RR: 17 (26 Aug 2023 04:46) (17 - 18)  SpO2: 97% (26 Aug 2023 04:46) (94% - 97%)    Parameters below as of 26 Aug 2023 04:46  Patient On (Oxygen Delivery Method): room air            I&O's Detail        LABS:                        15.4   20.89 )-----------( 468      ( 26 Aug 2023 07:36 )             46.5     08-26    121<L>  |  94<L>  |  68<H>  ----------------------------<  138<H>  6.8<HH>   |  12<L>  |  2.70<H>    Ca    8.5      26 Aug 2023 07:36  Phos  4.3     08-25  Mg     2.6     08-25    TPro  7.5  /  Alb  1.9<L>  /  TBili  0.6  /  DBili  x   /  AST  761<H>  /  ALT  270<H>  /  AlkPhos  178<H>  08-26          CAPILLARY BLOOD GLUCOSE      POCT Blood Glucose.: 122 mg/dL (26 Aug 2023 10:37)      Urinalysis Basic - ( 26 Aug 2023 07:36 )    Color: x / Appearance: x / SG: x / pH: x  Gluc: 138 mg/dL / Ketone: x  / Bili: x / Urobili: x   Blood: x / Protein: x / Nitrite: x   Leuk Esterase: x / RBC: x / WBC x   Sq Epi: x / Non Sq Epi: x / Bacteria: x      CULTURES:  Culture Results:   No growth (08-23 @ 12:35)  Culture Results:   No growth (08-23 @ 09:50)      Physical Examination:    General: No acute distress. Dry mucous membranes     HEENT: Pupils equal, reactive to light.  Symmetric.    PULM: Decreased breath sounds B/L    CVS: Regular rate and rhythm, no murmurs, rubs, or gallops    ABD: Soft, distended, nontender, normoactive bowel sounds, no masses    EXT: No edema, nontender    SKIN: Warm and well perfused, no rashes noted.    NEURO: Lethargic, non-verbal, but awake     RADIOLOGY:   < from: CT Abdomen and Pelvis w/ IV Cont (08.21.23 @ 17:48) >    ACC: [id number] EXAM:  CT ABDOMEN AND PELVIS IC   ORDERED BY: IRIS REAVES     PROCEDURE DATE:  08/21/2023          INTERPRETATION:  CLINICAL INFORMATION: Left flank pain.    COMPARISON: None.    CONTRAST/COMPLICATIONS:  IV Contrast: Omnipaque 350  90 cc administered   10 cc discarded  Oral Contrast: NONE  Complications: None reported at time of study completion    PROCEDURE:  CT of the Abdomen and Pelvis was performed.  Sagittal and coronal reformats were performed.    FINDINGS:  LOWER CHEST: Total situs inversus.    LIVER: Cirrhosis. A segment 8/4 A exophytic peripherally enhancing   low-density mass extends superiorly to the diaphragm, measuring   approximately 5.2 x 4.1 cm. Additional 5 cm cyst low-density lesions that   are too small to characterize.  BILE DUCTS: Normal caliber.  GALLBLADDER: Within normal limits.  SPLEEN: Within normal limits.  PANCREAS: Within normal limits.  ADRENALS: Within normal limits.  KIDNEYS/URETERS: Within normal limits.    BLADDER: Within normal limits.  REPRODUCTIVE ORGANS: Mildly prominent prostate gland.    BOWEL: No bowel obstruction.  PERITONEUM: Moderate volume ascites with soft tissue implants diffusely   along the peritoneum as well as the serosal surface of the sigmoid colon.   A perisigmoid implant measures approximately 3.9 x 2.8 cm in greatest   transaxial dimension. Soft tissue implants are noted along the falciform   ligament. Omental nodularity is present.  VESSELS: Patent portal, superior mesenteric, and splenic veins. Diffuse   abdominal collaterals..  RETROPERITONEUM/LYMPH NODES: No lymphadenopathy.  ABDOMINAL WALL: Left inguinal hernia containing fluid and fat.  BONES: Degenerative changes.    IMPRESSION: Total situs inversus.  Cirrhosis with a dominant liver mass concerning for primary HCC versus   metastasis.    Peritoneal carcinomatosis and moderate volume ascites.    --- End of Report ---    RASHAUN BARRIENTOS MD; Attending Radiologist  This document has been electronically signed. Aug 21 2023  6:07PM    Impression - 69 y/o Mele-speaking, visiting from [place], refused , Son Ang translated at bedside. Patient has history of EtOH misuse disorder, liver cirrhosis s/p esophageal varices banding, situs inversus, HF?, bradycardia, HTN, HLD, T2DM, seizure, GERD presents to ED due to subjective fever for the past 4-5 days with associated weakness, malaise, decreased PO intake, and abdominal bloating.  RRT called today for unresponsiveness per bedside nurse. Patient was completely awake and alert just prior, working with PT, on arrival patient found to be awake, but very lethargic, tracking with eyes, but non-verbal, unsure if syncope vs seizure, initial BP 80/48, unable to acquire repeat despite multiple attempts with Zoll and manual, SPO2 low 90s and place on NC O2, K+ 6.8, wbc 20, CTN 2.7<--2.1, and worsening  transaminitis. Patient admitted to ICU for further evaluation. Case discussed with Dr Rangel.     Unresponsive / likely Hepatic encephalopathy  Distributive shock   Chronic systolic HF   Decompensated cirrhosis likely 2/2 EtOH misuse  Ascites s/p paracentesis 8/24 - Culture negative   Coffee ground emesis   HCC / Peritoneal carcinomatosis  Hepatorenal syndrome   Hyperkalemia   MAIA      Neuro - Mentation improving post Levophed infusion, now more awake and alert, tracking, and attempting to answer questions, likely encephalopathic 2/2 to low-flow state   CV - Actively titrating Levo gtt for MAP > 75 given prior low-flow state, add Vaso, increase Midodrine dose to 10mg TID, HOLD GDMT in setting of hypotension, official TTE results pending for baseline EF and possible valvulopathies, Cardiology following and input appreciated   Pulm - SPO2 high 90s on NC O2, no signs of gross effusions or B-lines on POCUS   GI - Decompensated cirrhosis, ascites s/p paracentesis on 8/24, 4L removed, remains with abdominal distension, NGT insertion with immediate output of 650cc of coffee ground emesis, initiate PPI and Octreotide infusions, continue Rifaximin once gastric output clears, worsening LFTs likely multifactorial, avoid hepatotoxic agents, liver mass - Primary HCC vs mets, MELD score 26, but patient is not a candidate for transplantation 2/2 Visa status, GI following and input appreciated   Renal - MAIA w/up-trending CTN, likely hepatorenal post paracentesis, hyper K+ protocol given x 2, HOLD Lokelma for now as NGT is on LWS, no TW abnormalities on monitor, repeat STAT labs pending, HCO3 bolus x 1 given, +echavarria for strict I/Os, remains anuric thus far, confirmed with bladder scan and POCUS, add Albumin Q6, renally dose all meds, avoid nephrotoxins  Endocrine - A1c 9.4, ISS protocol w/Q4 BG while NPO plus standing Lantus, goal -180  Heme - Hgb 15 likely hemoconcentrated, no s/s of active bleeding, DVT PPx w/UFH Q12   ID - WBC > 20, but afebrile x 24 hours, possibly reactive, ADD Zosyn ATC Q8 for empiric coverage, BCx pending collection, Ascites / Peritoneal culture negative, MRSA swab pending, ID following - Rocephin course completed yesterday         Critical Care time: 55 mins assessing presenting problems of acute illness that poses high probability of life threatening deterioration or end organ damage/dysfunction.  Medical decision making including Initiating plan of care, reviewing data, reviewing radiology, direct patient bedside evaluation and interpretation of vital signs, any necessary ventilator management, discussion with multidisciplinary team, discussing goals of care with patient/family, all non inclusive of procedures Patient is a 68y old  Male who presents with a chief complaint of Liver Cirrhosis with ascites, Liver cancer, hyponatremia (26 Aug 2023 08:55)      BRIEF HOSPITAL COURSE: 67 y/o Mele-speaking, visiting from [place], refused , Son Ang translated at bedside. Patient has history of EtOH misuse disorder, liver cirrhosis s/p esophageal varices banding, situs inversus, HF?, bradycardia, HTN, HLD, T2DM, seizure, GERD presents to ED due to subjective fever for the past 4-5 days with associated weakness, malaise, decreased PO intake, and abdominal bloating. Per family symptoms progressively worsening and yesterday patient developed LUQ pain, and describes pain as dull/cramping in nature, intermittent, non-radiating. Patient also notes one episode of bloody stool with a little bright red blood 2 days ago. Patient AAOx4 at baseline, full independent in ADL's, and denies recent unintentional weight loss, nausea, vomiting, urinary symptoms, or other symptoms at this time. Patient admitted on 8/21/23 for further evaluation.     Events last 24 hours: RRT called today for unresponsiveness per bedside nurse. Patient was completely awake and alert just prior, working with PT, on arrival patient found to be awake, but very lethargic, tracking with eyes, but non-verbal, unsure if syncope vs seizure, initial BP 80/48, unable to acquire repeat despite multiple attempts with Zoll and manual, SPO2 low 90s and place on NC O2, K+ 6.8, wbc 20, CTN 2.7<--2.1, and worsening  transaminitis. Patient admitted to ICU for further evaluation. Case discussed with Dr Rangel.     PAST MEDICAL & SURGICAL HISTORY:  T2DM (type 2 diabetes mellitus)      History of seizure      History of bradycardia      GERD (gastroesophageal reflux disease)      History of cirrhosis of liver      HLD (hyperlipidemia)      HTN (hypertension)      H/O esophageal varices        Allergies    No Known Allergies    Intolerances      FAMILY HISTORY:  FH: type 2 diabetes (Mother)        Review of Systems: Unable to obtain 2/2 patient's current mental status     Medications:  rifAXIMin 550 milliGRAM(s) Oral two times a day    metoprolol tartrate 25 milliGRAM(s) Oral two times a day  midodrine 2.5 milliGRAM(s) Oral <User Schedule>    albuterol/ipratropium for Nebulization 3 milliLiter(s) Nebulizer every 6 hours PRN    melatonin 3 milliGRAM(s) Oral at bedtime PRN  phenytoin   Capsule 100 milliGRAM(s) Oral daily      heparin   Injectable 5000 Unit(s) SubCutaneous every 12 hours    senna 2 Tablet(s) Oral at bedtime  simethicone 80 milliGRAM(s) Chew daily      dextrose 50% Injectable 25 Gram(s) IV Push once  dextrose 50% Injectable 50 milliLiter(s) IV Push once  dextrose 50% Injectable 12.5 Gram(s) IV Push once  dextrose 50% Injectable 25 Gram(s) IV Push once  glucagon  Injectable 1 milliGRAM(s) IntraMuscular once  insulin glargine Injectable (LANTUS) 15 Unit(s) SubCutaneous every morning  insulin lispro (ADMELOG) corrective regimen sliding scale   SubCutaneous three times a day before meals  insulin lispro (ADMELOG) corrective regimen sliding scale   SubCutaneous at bedtime  insulin lispro Injectable (ADMELOG) 6 Unit(s) SubCutaneous before lunch  insulin lispro Injectable (ADMELOG) 6 Unit(s) SubCutaneous before breakfast  insulin lispro Injectable (ADMELOG) 6 Unit(s) SubCutaneous before dinner  insulin regular  human recombinant 5 Unit(s) IV Push once    albumin human 25% IVPB 50 milliLiter(s) IV Intermittent once  dextrose 5%. 1000 milliLiter(s) IV Continuous <Continuous>  dextrose 5%. 1000 milliLiter(s) IV Continuous <Continuous>  sodium chloride 0.9% Bolus 500 milliLiter(s) IV Bolus once        sodium zirconium cyclosilicate 10 Gram(s) Oral once          ICU Vital Signs Last 24 Hrs  T(C): 36.7 (26 Aug 2023 04:46), Max: 36.7 (26 Aug 2023 04:46)  T(F): 98.1 (26 Aug 2023 04:46), Max: 98.1 (26 Aug 2023 04:46)  HR: 96 (26 Aug 2023 04:46) (83 - 105)  BP: 101/72 (26 Aug 2023 04:46) (85/64 - 120/89)  BP(mean): --  ABP: --  ABP(mean): --  RR: 17 (26 Aug 2023 04:46) (17 - 18)  SpO2: 97% (26 Aug 2023 04:46) (94% - 97%)    O2 Parameters below as of 26 Aug 2023 04:46  Patient On (Oxygen Delivery Method): room air          Vital Signs Last 24 Hrs  T(C): 36.7 (26 Aug 2023 04:46), Max: 36.7 (26 Aug 2023 04:46)  T(F): 98.1 (26 Aug 2023 04:46), Max: 98.1 (26 Aug 2023 04:46)  HR: 96 (26 Aug 2023 04:46) (83 - 105)  BP: 101/72 (26 Aug 2023 04:46) (85/64 - 120/89)  BP(mean): --  RR: 17 (26 Aug 2023 04:46) (17 - 18)  SpO2: 97% (26 Aug 2023 04:46) (94% - 97%)    Parameters below as of 26 Aug 2023 04:46  Patient On (Oxygen Delivery Method): room air            I&O's Detail        LABS:                        15.4   20.89 )-----------( 468      ( 26 Aug 2023 07:36 )             46.5     08-26    121<L>  |  94<L>  |  68<H>  ----------------------------<  138<H>  6.8<HH>   |  12<L>  |  2.70<H>    Ca    8.5      26 Aug 2023 07:36  Phos  4.3     08-25  Mg     2.6     08-25    TPro  7.5  /  Alb  1.9<L>  /  TBili  0.6  /  DBili  x   /  AST  761<H>  /  ALT  270<H>  /  AlkPhos  178<H>  08-26          CAPILLARY BLOOD GLUCOSE      POCT Blood Glucose.: 122 mg/dL (26 Aug 2023 10:37)      Urinalysis Basic - ( 26 Aug 2023 07:36 )    Color: x / Appearance: x / SG: x / pH: x  Gluc: 138 mg/dL / Ketone: x  / Bili: x / Urobili: x   Blood: x / Protein: x / Nitrite: x   Leuk Esterase: x / RBC: x / WBC x   Sq Epi: x / Non Sq Epi: x / Bacteria: x      CULTURES:  Culture Results:   No growth (08-23 @ 12:35)  Culture Results:   No growth (08-23 @ 09:50)      Physical Examination:    General: No acute distress. Dry mucous membranes     HEENT: Pupils equal, reactive to light.  Symmetric.    PULM: Decreased breath sounds B/L    CVS: Regular rate and rhythm, no murmurs, rubs, or gallops    ABD: Soft, distended, nontender, normoactive bowel sounds, no masses    EXT: No edema, nontender    SKIN: Warm and well perfused, no rashes noted.    NEURO: Lethargic, non-verbal, but awake     RADIOLOGY:   < from: CT Abdomen and Pelvis w/ IV Cont (08.21.23 @ 17:48) >    ACC: [id number] EXAM:  CT ABDOMEN AND PELVIS IC   ORDERED BY: IRIS REAVES     PROCEDURE DATE:  08/21/2023          INTERPRETATION:  CLINICAL INFORMATION: Left flank pain.    COMPARISON: None.    CONTRAST/COMPLICATIONS:  IV Contrast: Omnipaque 350  90 cc administered   10 cc discarded  Oral Contrast: NONE  Complications: None reported at time of study completion    PROCEDURE:  CT of the Abdomen and Pelvis was performed.  Sagittal and coronal reformats were performed.    FINDINGS:  LOWER CHEST: Total situs inversus.    LIVER: Cirrhosis. A segment 8/4 A exophytic peripherally enhancing   low-density mass extends superiorly to the diaphragm, measuring   approximately 5.2 x 4.1 cm. Additional 5 cm cyst low-density lesions that   are too small to characterize.  BILE DUCTS: Normal caliber.  GALLBLADDER: Within normal limits.  SPLEEN: Within normal limits.  PANCREAS: Within normal limits.  ADRENALS: Within normal limits.  KIDNEYS/URETERS: Within normal limits.    BLADDER: Within normal limits.  REPRODUCTIVE ORGANS: Mildly prominent prostate gland.    BOWEL: No bowel obstruction.  PERITONEUM: Moderate volume ascites with soft tissue implants diffusely   along the peritoneum as well as the serosal surface of the sigmoid colon.   A perisigmoid implant measures approximately 3.9 x 2.8 cm in greatest   transaxial dimension. Soft tissue implants are noted along the falciform   ligament. Omental nodularity is present.  VESSELS: Patent portal, superior mesenteric, and splenic veins. Diffuse   abdominal collaterals..  RETROPERITONEUM/LYMPH NODES: No lymphadenopathy.  ABDOMINAL WALL: Left inguinal hernia containing fluid and fat.  BONES: Degenerative changes.    IMPRESSION: Total situs inversus.  Cirrhosis with a dominant liver mass concerning for primary HCC versus   metastasis.    Peritoneal carcinomatosis and moderate volume ascites.    --- End of Report ---    RASHAUN BARRIENTOS MD; Attending Radiologist  This document has been electronically signed. Aug 21 2023  6:07PM    Impression - 67 y/o Mele-speaking, visiting from [place], refused , Son Ang translated at bedside. Patient has history of EtOH misuse disorder, liver cirrhosis s/p esophageal varices banding, situs inversus, HF?, bradycardia, HTN, HLD, T2DM, seizure, GERD presents to ED due to subjective fever for the past 4-5 days with associated weakness, malaise, decreased PO intake, and abdominal bloating.  RRT called today for unresponsiveness per bedside nurse. Patient was completely awake and alert just prior, working with PT, on arrival patient found to be awake, but very lethargic, tracking with eyes, but non-verbal, unsure if syncope vs seizure, initial BP 80/48, unable to acquire repeat despite multiple attempts with Zoll and manual, SPO2 low 90s and place on NC O2, K+ 6.8, wbc 20, CTN 2.7<--2.1, and worsening  transaminitis. Patient admitted to ICU for further evaluation. Case discussed with Dr Rangel.     Unresponsive / likely Hepatic encephalopathy  Distributive shock   Chronic systolic HF   Decompensated cirrhosis likely 2/2 EtOH misuse  Ascites s/p paracentesis 8/24 - Culture negative   Coffee ground emesis   HCC / Peritoneal carcinomatosis  Hepatorenal syndrome   Hyperkalemia   MAIA      Neuro - Mentation improving post Levophed infusion, now more awake and alert, tracking, and attempting to answer questions, likely encephalopathic 2/2 to low-flow state   CV - Actively titrating Levo gtt for MAP > 75 given prior low-flow state, add Vaso, increase Midodrine dose to 10mg TID, HOLD GDMT in setting of hypotension, official TTE results pending for baseline EF and possible valvulopathies, Cardiology following and input appreciated   Pulm - SPO2 high 90s on NC O2, no signs of gross effusions or B-lines on POCUS   GI - Decompensated cirrhosis, ascites s/p paracentesis on 8/24, 4L removed, remains with abdominal distension, NGT insertion with immediate output of 650cc of coffee ground emesis, initiate PPI and Octreotide infusions, continue Rifaximin once gastric output clears, worsening LFTs likely multifactorial, avoid hepatotoxic agents, liver mass - Primary HCC vs mets, MELD score 26, but patient is not a candidate for transplantation 2/2 Visa status, GI following and input appreciated   Renal - MAIA w/up-trending CTN, likely hepatorenal post paracentesis, hyper K+ protocol given x 2, HOLD Lokelma for now as NGT is on LWS, no TW abnormalities on monitor, repeat STAT labs pending, HCO3 bolus x 1 given, will trend lactate, but unlikely to improve in setting of shock liver, +echavarria for strict I/Os, remains anuric thus far, confirmed with bladder scan and POCUS, add Albumin Q6, renally dose all meds, avoid nephrotoxins  Endocrine - A1c 9.4, ISS protocol w/Q4 BG while NPO plus standing Lantus, goal -180  Heme - Hgb 15 likely hemoconcentrated, no s/s of active bleeding, DVT PPx w/UFH Q12   ID - WBC > 20, but afebrile x 24 hours, possibly reactive, ADD Zosyn ATC Q8 for empiric coverage, BCx pending collection, Ascites / Peritoneal culture negative, MRSA swab pending, ID following - Rocephin course completed yesterday         Critical Care time: 55 mins assessing presenting problems of acute illness that poses high probability of life threatening deterioration or end organ damage/dysfunction.  Medical decision making including Initiating plan of care, reviewing data, reviewing radiology, direct patient bedside evaluation and interpretation of vital signs, any necessary ventilator management, discussion with multidisciplinary team, discussing goals of care with patient/family, all non inclusive of procedures Patient is a 68y old  Male who presents with a chief complaint of Liver Cirrhosis with ascites, Liver cancer, hyponatremia (26 Aug 2023 08:55)      BRIEF HOSPITAL COURSE: 69 y/o Mele-speaking, visiting from [place], refused , Son Ang translated at bedside. Patient has history of EtOH misuse disorder, liver cirrhosis s/p esophageal varices banding, situs inversus, HF?, bradycardia, HTN, HLD, T2DM, seizure, GERD presents to ED due to subjective fever for the past 4-5 days with associated weakness, malaise, decreased PO intake, and abdominal bloating. Per family symptoms progressively worsening and yesterday patient developed LUQ pain, and describes pain as dull/cramping in nature, intermittent, non-radiating. Patient also notes one episode of bloody stool with a little bright red blood 2 days ago. Patient AAOx4 at baseline, full independent in ADL's, and denies recent unintentional weight loss, nausea, vomiting, urinary symptoms, or other symptoms at this time. Patient admitted on 8/21/23 for further evaluation.     Events last 24 hours: RRT called today for unresponsiveness per bedside nurse. Patient was completely awake and alert just prior, working with PT, on arrival patient found to be awake, but very lethargic, tracking with eyes, but non-verbal, unsure if syncope vs seizure, initial BP 80/48, unable to acquire repeat despite multiple attempts with Zoll and manual, SPO2 low 90s and place on NC O2, K+ 6.8, wbc 20, CTN 2.7<--2.1, and worsening  transaminitis. Patient admitted to ICU for further evaluation. Case discussed with Dr Rangel.     PAST MEDICAL & SURGICAL HISTORY:  T2DM (type 2 diabetes mellitus)      History of seizure      History of bradycardia      GERD (gastroesophageal reflux disease)      History of cirrhosis of liver      HLD (hyperlipidemia)      HTN (hypertension)      H/O esophageal varices        Allergies    No Known Allergies    Intolerances      FAMILY HISTORY:  FH: type 2 diabetes (Mother)        Review of Systems: Unable to obtain 2/2 patient's current mental status     Medications:  rifAXIMin 550 milliGRAM(s) Oral two times a day    metoprolol tartrate 25 milliGRAM(s) Oral two times a day  midodrine 2.5 milliGRAM(s) Oral <User Schedule>    albuterol/ipratropium for Nebulization 3 milliLiter(s) Nebulizer every 6 hours PRN    melatonin 3 milliGRAM(s) Oral at bedtime PRN  phenytoin   Capsule 100 milliGRAM(s) Oral daily      heparin   Injectable 5000 Unit(s) SubCutaneous every 12 hours    senna 2 Tablet(s) Oral at bedtime  simethicone 80 milliGRAM(s) Chew daily      dextrose 50% Injectable 25 Gram(s) IV Push once  dextrose 50% Injectable 50 milliLiter(s) IV Push once  dextrose 50% Injectable 12.5 Gram(s) IV Push once  dextrose 50% Injectable 25 Gram(s) IV Push once  glucagon  Injectable 1 milliGRAM(s) IntraMuscular once  insulin glargine Injectable (LANTUS) 15 Unit(s) SubCutaneous every morning  insulin lispro (ADMELOG) corrective regimen sliding scale   SubCutaneous three times a day before meals  insulin lispro (ADMELOG) corrective regimen sliding scale   SubCutaneous at bedtime  insulin lispro Injectable (ADMELOG) 6 Unit(s) SubCutaneous before lunch  insulin lispro Injectable (ADMELOG) 6 Unit(s) SubCutaneous before breakfast  insulin lispro Injectable (ADMELOG) 6 Unit(s) SubCutaneous before dinner  insulin regular  human recombinant 5 Unit(s) IV Push once    albumin human 25% IVPB 50 milliLiter(s) IV Intermittent once  dextrose 5%. 1000 milliLiter(s) IV Continuous <Continuous>  dextrose 5%. 1000 milliLiter(s) IV Continuous <Continuous>  sodium chloride 0.9% Bolus 500 milliLiter(s) IV Bolus once        sodium zirconium cyclosilicate 10 Gram(s) Oral once          ICU Vital Signs Last 24 Hrs  T(C): 36.7 (26 Aug 2023 04:46), Max: 36.7 (26 Aug 2023 04:46)  T(F): 98.1 (26 Aug 2023 04:46), Max: 98.1 (26 Aug 2023 04:46)  HR: 96 (26 Aug 2023 04:46) (83 - 105)  BP: 101/72 (26 Aug 2023 04:46) (85/64 - 120/89)  BP(mean): --  ABP: --  ABP(mean): --  RR: 17 (26 Aug 2023 04:46) (17 - 18)  SpO2: 97% (26 Aug 2023 04:46) (94% - 97%)    O2 Parameters below as of 26 Aug 2023 04:46  Patient On (Oxygen Delivery Method): room air          Vital Signs Last 24 Hrs  T(C): 36.7 (26 Aug 2023 04:46), Max: 36.7 (26 Aug 2023 04:46)  T(F): 98.1 (26 Aug 2023 04:46), Max: 98.1 (26 Aug 2023 04:46)  HR: 96 (26 Aug 2023 04:46) (83 - 105)  BP: 101/72 (26 Aug 2023 04:46) (85/64 - 120/89)  BP(mean): --  RR: 17 (26 Aug 2023 04:46) (17 - 18)  SpO2: 97% (26 Aug 2023 04:46) (94% - 97%)    Parameters below as of 26 Aug 2023 04:46  Patient On (Oxygen Delivery Method): room air            I&O's Detail        LABS:                        15.4   20.89 )-----------( 468      ( 26 Aug 2023 07:36 )             46.5     08-26    121<L>  |  94<L>  |  68<H>  ----------------------------<  138<H>  6.8<HH>   |  12<L>  |  2.70<H>    Ca    8.5      26 Aug 2023 07:36  Phos  4.3     08-25  Mg     2.6     08-25    TPro  7.5  /  Alb  1.9<L>  /  TBili  0.6  /  DBili  x   /  AST  761<H>  /  ALT  270<H>  /  AlkPhos  178<H>  08-26          CAPILLARY BLOOD GLUCOSE      POCT Blood Glucose.: 122 mg/dL (26 Aug 2023 10:37)      Urinalysis Basic - ( 26 Aug 2023 07:36 )    Color: x / Appearance: x / SG: x / pH: x  Gluc: 138 mg/dL / Ketone: x  / Bili: x / Urobili: x   Blood: x / Protein: x / Nitrite: x   Leuk Esterase: x / RBC: x / WBC x   Sq Epi: x / Non Sq Epi: x / Bacteria: x      CULTURES:  Culture Results:   No growth (08-23 @ 12:35)  Culture Results:   No growth (08-23 @ 09:50)      Physical Examination:    General: No acute distress. Dry mucous membranes     HEENT: Pupils equal, reactive to light.  Symmetric.    PULM: Decreased breath sounds B/L    CVS: Regular rate and rhythm, no murmurs, rubs, or gallops    ABD: Soft, distended, nontender, normoactive bowel sounds, no masses    EXT: No edema, nontender    SKIN: Warm and well perfused, no rashes noted.    NEURO: Lethargic, non-verbal, but awake     RADIOLOGY:   < from: CT Abdomen and Pelvis w/ IV Cont (08.21.23 @ 17:48) >    ACC: [id number] EXAM:  CT ABDOMEN AND PELVIS IC   ORDERED BY: IRIS REAVES     PROCEDURE DATE:  08/21/2023          INTERPRETATION:  CLINICAL INFORMATION: Left flank pain.    COMPARISON: None.    CONTRAST/COMPLICATIONS:  IV Contrast: Omnipaque 350  90 cc administered   10 cc discarded  Oral Contrast: NONE  Complications: None reported at time of study completion    PROCEDURE:  CT of the Abdomen and Pelvis was performed.  Sagittal and coronal reformats were performed.    FINDINGS:  LOWER CHEST: Total situs inversus.    LIVER: Cirrhosis. A segment 8/4 A exophytic peripherally enhancing   low-density mass extends superiorly to the diaphragm, measuring   approximately 5.2 x 4.1 cm. Additional 5 cm cyst low-density lesions that   are too small to characterize.  BILE DUCTS: Normal caliber.  GALLBLADDER: Within normal limits.  SPLEEN: Within normal limits.  PANCREAS: Within normal limits.  ADRENALS: Within normal limits.  KIDNEYS/URETERS: Within normal limits.    BLADDER: Within normal limits.  REPRODUCTIVE ORGANS: Mildly prominent prostate gland.    BOWEL: No bowel obstruction.  PERITONEUM: Moderate volume ascites with soft tissue implants diffusely   along the peritoneum as well as the serosal surface of the sigmoid colon.   A perisigmoid implant measures approximately 3.9 x 2.8 cm in greatest   transaxial dimension. Soft tissue implants are noted along the falciform   ligament. Omental nodularity is present.  VESSELS: Patent portal, superior mesenteric, and splenic veins. Diffuse   abdominal collaterals..  RETROPERITONEUM/LYMPH NODES: No lymphadenopathy.  ABDOMINAL WALL: Left inguinal hernia containing fluid and fat.  BONES: Degenerative changes.    IMPRESSION: Total situs inversus.  Cirrhosis with a dominant liver mass concerning for primary HCC versus   metastasis.    Peritoneal carcinomatosis and moderate volume ascites.    --- End of Report ---    RASHAUN BARRIENTOS MD; Attending Radiologist  This document has been electronically signed. Aug 21 2023  6:07PM    Impression - 69 y/o Mele-speaking, visiting from [place], refused , Son Ang translated at bedside. Patient has history of EtOH misuse disorder, liver cirrhosis s/p esophageal varices banding, situs inversus, HF?, bradycardia, HTN, HLD, T2DM, seizure, GERD presents to ED due to subjective fever for the past 4-5 days with associated weakness, malaise, decreased PO intake, and abdominal bloating.  RRT called today for unresponsiveness per bedside nurse. Patient was completely awake and alert just prior, working with PT, on arrival patient found to be awake, but very lethargic, tracking with eyes, but non-verbal, unsure if syncope vs seizure, initial BP 80/48, unable to acquire repeat despite multiple attempts with Zoll and manual, SPO2 low 90s and place on NC O2, K+ 6.8, wbc 20, CTN 2.7<--2.1, and worsening  transaminitis. Patient admitted to ICU for further evaluation. Case discussed with Dr Rangel.     Unresponsive / likely Hepatic encephalopathy  Distributive shock   Chronic systolic HF   Decompensated cirrhosis likely 2/2 EtOH misuse  Ascites s/p paracentesis 8/24 - Culture negative   Coffee ground emesis   HCC / Peritoneal carcinomatosis  Hepatorenal syndrome   Hyperkalemia   MAIA      Neuro - Mentation improving post Levophed infusion, now more awake and alert, tracking, and attempting to answer questions, likely encephalopathic 2/2 to low-flow state   CV - Actively titrating Levo gtt for MAP > 75 given prior low-flow state, add Vaso, increase Midodrine dose to 10mg TID, HOLD GDMT in setting of hypotension, official TTE results pending for baseline EF and possible valvulopathies, Cardiology following and input appreciated   Pulm - SPO2 high 90s on NC O2, no signs of gross effusions or B-lines on POCUS   GI - Decompensated cirrhosis, ascites s/p paracentesis on 8/24, 4L removed, remains with abdominal distension, NGT insertion with immediate output of 650cc of coffee ground emesis, initiate PPI and Octreotide infusions, continue Rifaximin once gastric output clears, worsening LFTs likely multifactorial, avoid hepatotoxic agents, liver mass - Primary HCC vs mets, MELD score 26, but patient is not a candidate for transplantation 2/2 Visa status, GI following and input appreciated   Renal - MAIA w/up-trending CTN, likely hepatorenal post paracentesis, hyper K+ protocol given x 2, HOLD Lokelma for now as NGT is on LWS, no TW abnormalities on monitor, repeat STAT labs pending, HCO3 bolus x 1 given and infusion 100cc/hr, will trend lactate, but unlikely to improve in setting of shock liver, +echavarria for strict I/Os, remains anuric thus far, confirmed with bladder scan and POCUS, add Albumin Q6, renally dose all meds, avoid nephrotoxins  Endocrine - A1c 9.4, ISS protocol w/Q4 BG while NPO plus standing Lantus, goal -180  Heme - Hgb 15 likely hemoconcentrated, no s/s of active bleeding, DVT PPx w/UFH Q12   ID - WBC > 20, but afebrile x 24 hours, possibly reactive, ADD Zosyn ATC Q8 for empiric coverage, BCx pending collection, Ascites / Peritoneal culture negative, MRSA swab pending, ID following - Rocephin course completed yesterday         Critical Care time: 55 mins assessing presenting problems of acute illness that poses high probability of life threatening deterioration or end organ damage/dysfunction.  Medical decision making including Initiating plan of care, reviewing data, reviewing radiology, direct patient bedside evaluation and interpretation of vital signs, any necessary ventilator management, discussion with multidisciplinary team, discussing goals of care with patient/family, all non inclusive of procedures Patient is a 68y old  Male who presents with a chief complaint of Liver Cirrhosis with ascites, Liver cancer, hyponatremia (26 Aug 2023 08:55)      BRIEF HOSPITAL COURSE: 67 y/o Mele-speaking, visiting from [place], refused , Son Ang translated at bedside. Patient has history of EtOH misuse disorder, liver cirrhosis s/p esophageal varices banding, situs inversus, HF?, bradycardia, HTN, HLD, T2DM, seizure, GERD presents to ED due to subjective fever for the past 4-5 days with associated weakness, malaise, decreased PO intake, and abdominal bloating. Per family symptoms progressively worsening and yesterday patient developed LUQ pain, and describes pain as dull/cramping in nature, intermittent, non-radiating. Patient also notes one episode of bloody stool with a little bright red blood 2 days ago. Patient AAOx4 at baseline, full independent in ADL's, and denies recent unintentional weight loss, nausea, vomiting, urinary symptoms, or other symptoms at this time. Patient admitted on 8/21/23 for further evaluation.     Events last 24 hours: RRT called today for unresponsiveness per bedside nurse. Patient was completely awake and alert just prior, working with PT, on arrival patient found to be awake, but very lethargic, tracking with eyes, but non-verbal, unsure if syncope vs seizure, initial BP 80/48, unable to acquire repeat despite multiple attempts with Zoll and manual, SPO2 low 90s and place on NC O2, K+ 6.8, wbc 20, CTN 2.7<--2.1, and worsening  transaminitis. Patient admitted to ICU for further evaluation. Case discussed with Dr Rangel.     PAST MEDICAL & SURGICAL HISTORY:  T2DM (type 2 diabetes mellitus)      History of seizure      History of bradycardia      GERD (gastroesophageal reflux disease)      History of cirrhosis of liver      HLD (hyperlipidemia)      HTN (hypertension)      H/O esophageal varices        Allergies    No Known Allergies    Intolerances      FAMILY HISTORY:  FH: type 2 diabetes (Mother)        Review of Systems: Unable to obtain 2/2 patient's current mental status     Medications:  rifAXIMin 550 milliGRAM(s) Oral two times a day    metoprolol tartrate 25 milliGRAM(s) Oral two times a day  midodrine 2.5 milliGRAM(s) Oral <User Schedule>    albuterol/ipratropium for Nebulization 3 milliLiter(s) Nebulizer every 6 hours PRN    melatonin 3 milliGRAM(s) Oral at bedtime PRN  phenytoin   Capsule 100 milliGRAM(s) Oral daily      heparin   Injectable 5000 Unit(s) SubCutaneous every 12 hours    senna 2 Tablet(s) Oral at bedtime  simethicone 80 milliGRAM(s) Chew daily      dextrose 50% Injectable 25 Gram(s) IV Push once  dextrose 50% Injectable 50 milliLiter(s) IV Push once  dextrose 50% Injectable 12.5 Gram(s) IV Push once  dextrose 50% Injectable 25 Gram(s) IV Push once  glucagon  Injectable 1 milliGRAM(s) IntraMuscular once  insulin glargine Injectable (LANTUS) 15 Unit(s) SubCutaneous every morning  insulin lispro (ADMELOG) corrective regimen sliding scale   SubCutaneous three times a day before meals  insulin lispro (ADMELOG) corrective regimen sliding scale   SubCutaneous at bedtime  insulin lispro Injectable (ADMELOG) 6 Unit(s) SubCutaneous before lunch  insulin lispro Injectable (ADMELOG) 6 Unit(s) SubCutaneous before breakfast  insulin lispro Injectable (ADMELOG) 6 Unit(s) SubCutaneous before dinner  insulin regular  human recombinant 5 Unit(s) IV Push once    albumin human 25% IVPB 50 milliLiter(s) IV Intermittent once  dextrose 5%. 1000 milliLiter(s) IV Continuous <Continuous>  dextrose 5%. 1000 milliLiter(s) IV Continuous <Continuous>  sodium chloride 0.9% Bolus 500 milliLiter(s) IV Bolus once        sodium zirconium cyclosilicate 10 Gram(s) Oral once          ICU Vital Signs Last 24 Hrs  T(C): 36.7 (26 Aug 2023 04:46), Max: 36.7 (26 Aug 2023 04:46)  T(F): 98.1 (26 Aug 2023 04:46), Max: 98.1 (26 Aug 2023 04:46)  HR: 96 (26 Aug 2023 04:46) (83 - 105)  BP: 101/72 (26 Aug 2023 04:46) (85/64 - 120/89)  BP(mean): --  ABP: --  ABP(mean): --  RR: 17 (26 Aug 2023 04:46) (17 - 18)  SpO2: 97% (26 Aug 2023 04:46) (94% - 97%)    O2 Parameters below as of 26 Aug 2023 04:46  Patient On (Oxygen Delivery Method): room air          Vital Signs Last 24 Hrs  T(C): 36.7 (26 Aug 2023 04:46), Max: 36.7 (26 Aug 2023 04:46)  T(F): 98.1 (26 Aug 2023 04:46), Max: 98.1 (26 Aug 2023 04:46)  HR: 96 (26 Aug 2023 04:46) (83 - 105)  BP: 101/72 (26 Aug 2023 04:46) (85/64 - 120/89)  BP(mean): --  RR: 17 (26 Aug 2023 04:46) (17 - 18)  SpO2: 97% (26 Aug 2023 04:46) (94% - 97%)    Parameters below as of 26 Aug 2023 04:46  Patient On (Oxygen Delivery Method): room air            I&O's Detail        LABS:                        15.4   20.89 )-----------( 468      ( 26 Aug 2023 07:36 )             46.5     08-26    121<L>  |  94<L>  |  68<H>  ----------------------------<  138<H>  6.8<HH>   |  12<L>  |  2.70<H>    Ca    8.5      26 Aug 2023 07:36  Phos  4.3     08-25  Mg     2.6     08-25    TPro  7.5  /  Alb  1.9<L>  /  TBili  0.6  /  DBili  x   /  AST  761<H>  /  ALT  270<H>  /  AlkPhos  178<H>  08-26          CAPILLARY BLOOD GLUCOSE      POCT Blood Glucose.: 122 mg/dL (26 Aug 2023 10:37)      Urinalysis Basic - ( 26 Aug 2023 07:36 )    Color: x / Appearance: x / SG: x / pH: x  Gluc: 138 mg/dL / Ketone: x  / Bili: x / Urobili: x   Blood: x / Protein: x / Nitrite: x   Leuk Esterase: x / RBC: x / WBC x   Sq Epi: x / Non Sq Epi: x / Bacteria: x      CULTURES:  Culture Results:   No growth (08-23 @ 12:35)  Culture Results:   No growth (08-23 @ 09:50)      Physical Examination:    General: No acute distress. Dry mucous membranes     HEENT: Pupils equal, reactive to light.  Symmetric.    PULM: Decreased breath sounds B/L    CVS: Regular rate and rhythm, no murmurs, rubs, or gallops    ABD: Soft, distended, nontender, normoactive bowel sounds, no masses    EXT: No edema, nontender    SKIN: Warm and well perfused, no rashes noted.    NEURO: Lethargic, non-verbal, but awake     RADIOLOGY:   < from: CT Abdomen and Pelvis w/ IV Cont (08.21.23 @ 17:48) >    ACC: [id number] EXAM:  CT ABDOMEN AND PELVIS IC   ORDERED BY: IRIS REAVES     PROCEDURE DATE:  08/21/2023          INTERPRETATION:  CLINICAL INFORMATION: Left flank pain.    COMPARISON: None.    CONTRAST/COMPLICATIONS:  IV Contrast: Omnipaque 350  90 cc administered   10 cc discarded  Oral Contrast: NONE  Complications: None reported at time of study completion    PROCEDURE:  CT of the Abdomen and Pelvis was performed.  Sagittal and coronal reformats were performed.    FINDINGS:  LOWER CHEST: Total situs inversus.    LIVER: Cirrhosis. A segment 8/4 A exophytic peripherally enhancing   low-density mass extends superiorly to the diaphragm, measuring   approximately 5.2 x 4.1 cm. Additional 5 cm cyst low-density lesions that   are too small to characterize.  BILE DUCTS: Normal caliber.  GALLBLADDER: Within normal limits.  SPLEEN: Within normal limits.  PANCREAS: Within normal limits.  ADRENALS: Within normal limits.  KIDNEYS/URETERS: Within normal limits.    BLADDER: Within normal limits.  REPRODUCTIVE ORGANS: Mildly prominent prostate gland.    BOWEL: No bowel obstruction.  PERITONEUM: Moderate volume ascites with soft tissue implants diffusely   along the peritoneum as well as the serosal surface of the sigmoid colon.   A perisigmoid implant measures approximately 3.9 x 2.8 cm in greatest   transaxial dimension. Soft tissue implants are noted along the falciform   ligament. Omental nodularity is present.  VESSELS: Patent portal, superior mesenteric, and splenic veins. Diffuse   abdominal collaterals..  RETROPERITONEUM/LYMPH NODES: No lymphadenopathy.  ABDOMINAL WALL: Left inguinal hernia containing fluid and fat.  BONES: Degenerative changes.    IMPRESSION: Total situs inversus.  Cirrhosis with a dominant liver mass concerning for primary HCC versus   metastasis.    Peritoneal carcinomatosis and moderate volume ascites.    --- End of Report ---    RASHAUN BARRIENTOS MD; Attending Radiologist  This document has been electronically signed. Aug 21 2023  6:07PM    Impression - 67 y/o Mele-speaking, visiting from [place], refused , Son Ang translated at bedside. Patient has history of EtOH misuse disorder, liver cirrhosis s/p esophageal varices banding, situs inversus, HF?, bradycardia, HTN, HLD, T2DM, seizure, GERD presents to ED due to subjective fever for the past 4-5 days with associated weakness, malaise, decreased PO intake, and abdominal bloating.  RRT called today for unresponsiveness per bedside nurse. Patient was completely awake and alert just prior, working with PT, on arrival patient found to be awake, but very lethargic, tracking with eyes, but non-verbal, unsure if syncope vs seizure, initial BP 80/48, unable to acquire repeat despite multiple attempts with Zoll and manual, SPO2 low 90s and place on NC O2, K+ 6.8, wbc 20, CTN 2.7<--2.1, and worsening  transaminitis. Patient admitted to ICU for further evaluation. Case discussed with Dr Rangel.     Unresponsive / likely Hepatic encephalopathy  Distributive shock   Chronic systolic HF   Decompensated cirrhosis likely 2/2 EtOH misuse  Ascites s/p paracentesis 8/24 - Culture negative   Coffee ground emesis   HCC / Peritoneal carcinomatosis  Hepatorenal syndrome   Hyperkalemia   Hyperammonemia   MAIA      Neuro - Mentation improving post Levophed infusion, now more awake and alert, tracking, and attempting to answer questions, likely encephalopathic 2/2 to low-flow state   CV - Actively titrating Levo gtt for MAP > 75 given prior low-flow state, add Vaso, increase Midodrine dose to 10mg TID, HOLD GDMT in setting of hypotension, official TTE results pending for baseline EF and possible valvulopathies, Cardiology following and input appreciated   Pulm - SPO2 high 90s on NC O2, no signs of gross effusions or B-lines on POCUS   GI - Decompensated cirrhosis, ascites s/p paracentesis on 8/24, 4L removed, remains with abdominal distension, NGT insertion with immediate output of 650cc of coffee ground emesis, initiate PPI and Octreotide infusions, continue Rifaximin and lactulose once gastric output clears, trend ammonia, worsening LFTs likely multifactorial, avoid hepatotoxic agents, liver mass - Primary HCC vs mets, MELD score 26, but patient is not a candidate for transplantation 2/2 Visa status, GI following and input appreciated   Renal - MAIA w/up-trending CTN, likely hepatorenal post paracentesis, hyper K+ protocol given x 2, HOLD Lokelma for now as NGT is on LWS, no TW abnormalities on monitor, repeat STAT labs pending, HCO3 bolus x 1 given and infusion 100cc/hr, will trend lactate, but unlikely to improve in setting of shock liver, +echavarria for strict I/Os, remains anuric thus far, confirmed with bladder scan and POCUS, add Albumin Q6, renally dose all meds, avoid nephrotoxins  Endocrine - A1c 9.4, ISS protocol w/Q4 BG while NPO plus standing Lantus, goal -180  Heme - Hgb 15 likely hemoconcentrated, no s/s of active bleeding, DVT PPx w/UFH Q12   ID - WBC > 20, but afebrile x 24 hours, possibly reactive, ADD Zosyn ATC Q8 for empiric coverage, BCx pending collection, Ascites / Peritoneal culture negative, MRSA swab pending, ID following - Rocephin course completed yesterday         Critical Care time: 55 mins assessing presenting problems of acute illness that poses high probability of life threatening deterioration or end organ damage/dysfunction.  Medical decision making including Initiating plan of care, reviewing data, reviewing radiology, direct patient bedside evaluation and interpretation of vital signs, any necessary ventilator management, discussion with multidisciplinary team, discussing goals of care with patient/family, all non inclusive of procedures

## 2023-08-26 NOTE — PROGRESS NOTE ADULT - PROBLEM SELECTOR PLAN 7
Chronic  - In ED: Glu 450 on admission, downtrended to 269 s/p Humulin 5U  - Hgb A1C 9.4%  - Hold home oral meds  - Continue Moderate dose insulin corrective scale  - Increased to 15U lantus and continue 6U lispro TID  - Endo (Dr. Perlman) consulted  - Hypoglycemia protocol, fingerstick glucose QAC&HS

## 2023-08-26 NOTE — PROVIDER CONTACT NOTE (EICU) - BACKGROUND
team at bedside placing femoral line, patient hypotensive requiring pressor support, request for assistance with  for norepinephrine infusion 8 mg/250 mL concentration

## 2023-08-26 NOTE — PROGRESS NOTE ADULT - SUBJECTIVE AND OBJECTIVE BOX
Interval Events:  Transferred to ICU for hypotension  Now on two vasopressors  Mentation somewhat improved with higher BP    REVIEW OF SYSTEMS:  [ ] All other systems negative  [x] Unable to assess ROS due to confusion    OBJECTIVE:  ICU Vital Signs Last 24 Hrs  T(C): 36.3 (26 Aug 2023 15:46), Max: 36.7 (26 Aug 2023 04:46)  T(F): 97.4 (26 Aug 2023 15:46), Max: 98.1 (26 Aug 2023 04:46)  HR: 84 (26 Aug 2023 15:00) (66 - 96)  BP: 139/60 (26 Aug 2023 15:00) (52/25 - 176/71)  BP(mean): 87 (26 Aug 2023 15:00) (34 - 102)  ABP: --  ABP(mean): --  RR: 29 (26 Aug 2023 15:00) (17 - 32)  SpO2: 97% (26 Aug 2023 15:00) (94% - 100%)    O2 Parameters below as of 26 Aug 2023 11:11  Patient On (Oxygen Delivery Method): nasal cannula  O2 Flow (L/min): 3            08-26 @ 07:01  -  08-26 @ 16:37  --------------------------------------------------------  IN: 617 mL / OUT: 0 mL / NET: 617 mL      CAPILLARY BLOOD GLUCOSE      POCT Blood Glucose.: 99 mg/dL (26 Aug 2023 11:46)      PHYSICAL EXAM:  General: NAD  HEENT: NC/AT  Neck: Supple  Respiratory: CTAB anteriorly. Decreased BS at bases. No wheezing.  Cardiovascular: RRR. No edema.   Abdomen: Distended. Soft. Nontender.  Extremities: Warm. No edema.  Skin: Intact  Neurological: PERRL. Confused. No focal deficits.      HOSPITAL MEDICATIONS:  heparin   Injectable 5000 Unit(s) SubCutaneous every 12 hours    piperacillin/tazobactam IVPB.. 3.375 Gram(s) IV Intermittent every 8 hours  rifAXIMin 550 milliGRAM(s) Oral two times a day    midodrine 2.5 milliGRAM(s) Oral <User Schedule>  midodrine 10 milliGRAM(s) Oral every 8 hours  norepinephrine Infusion 0.05 MICROgram(s)/kG/Min IV Continuous <Continuous>    dextrose 50% Injectable 25 Gram(s) IV Push once  dextrose 50% Injectable 12.5 Gram(s) IV Push once  dextrose 50% Injectable 25 Gram(s) IV Push once  glucagon  Injectable 1 milliGRAM(s) IntraMuscular once  insulin glargine Injectable (LANTUS) 15 Unit(s) SubCutaneous every morning  insulin lispro (ADMELOG) corrective regimen sliding scale   SubCutaneous three times a day before meals  insulin lispro (ADMELOG) corrective regimen sliding scale   SubCutaneous at bedtime  vasopressin Infusion 0.04 Unit(s)/Min IV Continuous <Continuous>    albuterol/ipratropium for Nebulization 3 milliLiter(s) Nebulizer every 6 hours PRN    melatonin 3 milliGRAM(s) Oral at bedtime PRN  phenytoin   Capsule 100 milliGRAM(s) Oral daily    senna 2 Tablet(s) Oral at bedtime  simethicone 80 milliGRAM(s) Chew daily        albumin human 25% IVPB 50 milliLiter(s) IV Intermittent every 6 hours  dextrose 5%. 1000 milliLiter(s) IV Continuous <Continuous>  dextrose 5%. 1000 milliLiter(s) IV Continuous <Continuous>  sodium chloride 0.45% 1000 milliLiter(s) IV Continuous <Continuous>  sodium chloride 0.9% lock flush 10 milliLiter(s) IV Push every 1 hour PRN      chlorhexidine 2% Cloths 1 Application(s) Topical daily  chlorhexidine 4% Liquid 1 Application(s) Topical <User Schedule>        LABS:                        14.3   22.10 )-----------( 462      ( 26 Aug 2023 13:50 )             43.9     Hgb Trend: 14.3<--, 15.4<--, 14.3<--, 14.1<--, 14.6<--  08-26    122<L>  |  94<L>  |  74<H>  ----------------------------<  154<H>  6.4<HH>   |  11<L>  |  3.30<H>    Ca    9.0      26 Aug 2023 13:50  Phos  4.4     08-26  Mg     3.5     08-26    TPro  7.0  /  Alb  1.9<L>  /  TBili  0.8  /  DBili  x   /  AST  2350<H>  /  ALT  699<H>  /  AlkPhos  163<H>  08-26    Creatinine Trend: 3.30<--, 2.70<--, 2.10<--, 1.90<--, 1.80<--, 1.70<--    Urinalysis Basic - ( 26 Aug 2023 13:50 )    Color: x / Appearance: x / SG: x / pH: x  Gluc: 154 mg/dL / Ketone: x  / Bili: x / Urobili: x   Blood: x / Protein: x / Nitrite: x   Leuk Esterase: x / RBC: x / WBC x   Sq Epi: x / Non Sq Epi: x / Bacteria: x            MICROBIOLOGY:     RADIOLOGY:  [x] Reviewed and interpreted by me  CXR 8/21/23 situs inversus, no consolidation or effusion

## 2023-08-26 NOTE — PROGRESS NOTE ADULT - ASSESSMENT
67 yo Mele-speaking male PMH of liver cirrhosis s/p esophageal varices banding, situs inversus, HF?, bradycardia, HTN, HLD, T2DM, seizure disorder, GERD presents to ED for 4-5 days of subjective fever, weakness, malaise, decreased appetite, abd bloating and LUQ pain. Admitted for liver cirrhosis with ascites, concerning for HCC, complicated with sepsis possible secondary to SBP and electrolyte imbalance/hyponatremia. Complicated by hyperkalemia and lethargy. RRT was called and patient was moved to ICU for further management.

## 2023-08-26 NOTE — PROGRESS NOTE ADULT - PROBLEM SELECTOR PROBLEM 12
EENMT
Need for prophylactic measure

## 2023-08-26 NOTE — CHART NOTE - NSCHARTNOTEFT_GEN_A_CORE
Resident Rapid Response Note    Rapid response was called at 10:30 for unresponsiveness.    Events leading up to Rapid Response: Patient was laying in bed with the attending (Dr. Sweet) and family. When he suddenly became unresponsive. Patient was awake, not alert, and had increased work of breathing. Attempted to stimulate and reorient, but was unsuccessful.     Patient was seen and examined at the bedside by the rapid response team. Dr. Sweet, ICU PA, Dr. Enciso at bedside.    Rapid Response Vital Signs:  BP: 76/42, unable to repeat with manual - unable to auscultate, palpate, or doppler.  HR: 68  RR: 20  SpO2: 94% on 4L  Temp: unable to obtain  FS: 122        Physical Exam:  Gen: lethargic, NC in place, minimally verbal, increased work of breathing  HEENT: NCAT, PEERLA b/l  Cardio: soft heart sounds, tachycardic, regular rhythm, +s1s2  Pulm: CTA b/l, no wheezes, rales or rhonchi  Abdomen: +distended, hard, nontender, +BS x4 quadrants  Extremities: no cyanosis or edema, soft pulses  Neuro: patient responds to commands, but minimally verbal.   Skin: cold and dry        Assessment/Plan:  68 year old Male with PMHx liver cirrhosis (s/p esophageal varices banding), situs inversus, HFrEF, bradycardia, HTN, HLD, T2DM, seizure disorder, GERD admitted with liver cirrhosis w/ ascites, concern for HCC, complicated w/ sepsis (due to possible SBP) and hyponatremia. Rapid response called for unresponsiveness.     1) Decompensated Liver Cirrhosis w/ ascites  2) Sepsis possible 2/2 SBP  3) MAIA on CKD, hepatorenal syndrome  4) Hyperkalemia  5) Seizure disorder  6) Tachycardia      - Lethargy likely 2/2 hypoperfusion from hypotension vs hyperammonemia. Unable to obtain BP during rapid, transferred to ICU for A-line.  - started on midodrine yesterday for low BP.  - Ordered albumin x1.   - For increased work of breathing, placed on NC 4L.  - f/u ammonia level  - AM labs showed hyperkalemia(6.8), worsening kidney function(2.7) and worsening LFT.  - Ordered 5U insulin + dextrose, lokelma, dced spironolactone  - s/p digoxin x1 for tachycardia yesterday. f/u digoxin level today.  - Hx of seizure disorder, on phenytoin.  - Pt brought to ICU for further management.      -Discussed with Dr. Sweet and Dr. Enciso, agrees with above plan  -Family at bedside updated by Dr. Sweet Resident Rapid Response Note    Rapid response was called at 10:30 for unresponsiveness.    Events leading up to Rapid Response: Patient was laying in bed with the attending (Dr. Sweet) and family. When he suddenly became unresponsive. Patient was awake, not alert, and had increased work of breathing. Attempted to stimulate and reorient, but was unsuccessful.     Patient was seen and examined at the bedside by the rapid response team. Dr. Sweet, ICU PA, Dr. Enciso at bedside.    Rapid Response Vital Signs:  BP: 76/42, unable to repeat with manual - unable to auscultate, palpate, or doppler.  HR: 68  RR: 20  SpO2: 94% on 4L  Temp: unable to obtain  FS: 122        Physical Exam:  Gen: lethargic, NC in place, minimally verbal, increased work of breathing  HEENT: NCAT, PEERLA b/l  Cardio: soft heart sounds, tachycardic, regular rhythm, +s1s2  Pulm: CTA b/l, no wheezes, rales or rhonchi  Abdomen: +distended, hard, nontender, +BS x4 quadrants  Extremities: no cyanosis or edema, soft pulses  Neuro: patient responds to commands, but minimally verbal.   Skin: cold and dry        Assessment/Plan:  68 year old Male with PMHx liver cirrhosis (s/p esophageal varices banding), situs inversus, HFrEF, bradycardia, HTN, HLD, T2DM, seizure disorder, GERD admitted with liver cirrhosis w/ ascites, concern for HCC, complicated w/ sepsis (due to possible SBP) and hyponatremia. Rapid response called for unresponsiveness.     1) Decompensated Liver Cirrhosis w/ ascites  2) Sepsis possible 2/2 SBP  3) MAIA on CKD, hepatorenal syndrome  4) Hyperkalemia  5) Seizure disorder  6) Tachycardia      - Lethargy likely 2/2 hypoperfusion from hypotension vs hyperammonemia. Unable to obtain BP during rapid, transferred to ICU for A-line.  - started on midodrine yesterday for low BP.  - Ordered albumin x1.   - For increased work of breathing, placed on NC 4L.  - f/u ammonia level  - AM labs showed hyperkalemia(6.8), worsening kidney function(2.7) and worsening LFT.  - Ordered 5U insulin + dextrose, lokelma, dced spironolactone  - s/p digoxin x1 for tachycardia yesterday. f/u digoxin level today.  - Hx of seizure disorder, on phenytoin.  - Pt brought to ICU for further management.      -Discussed with Dr. Sweet and Dr. Enciso, agrees with above plan  -Family at bedside updated by Dr. Sweet    ---------------------------    2Hr Follow-Up    Patient transferred to ICU  - A-line in place for close BP management. Patient now on 2 vasopressors to maintain MAPs 80-85  - Mentation somewhat improved with higher BP  - NGT placed as patient had AMS, suctioned 650cc black fluid. Concern for upper GIB. Repeat CBC pending. Cont PPI  - Received Insulin and dextrose for hyperkalemia. Repeat K pending  - Receiving albumin and IVF prn  - Further management per ICU, hospitalist team will cont to follow

## 2023-08-26 NOTE — PROGRESS NOTE ADULT - TIME BILLING
pt seen and examine today see above plan - pt Chronic, s/p esophageal varices banding   with liver cirrhosis  ascites  - Used to drink alcohol years ago  , AST 50 and Alk phos 138; ALT WNL- CT A/P: Total situs inversus. Cirrhosis with a dominant liver mass concerning for primary HCC versus metastasis   Peritoneal carcinomatosis and moderate volume ascites-   but afp level  wnl  [ family  aware will need out pt liver biopsy]  - s/p  iv 40  mg  Lasix    , on spironolactone 25 mg po daily ,  s/p IR paracentesis - 4750cc of tram ascitic fluid removed most likely Transudative    - fluid cult so far neg   no sbp  cyto pathology   +   suspicion  NEOPLASM ,   leucocytosis  post  procedure  reactive  fu trend wbc .  hypotensive lethargic  called rapid  response - transfer icu care with hepatorenal faliure , hyperkalemia , hypotensive   ,  ascites    chr systolic chf    need pressure support. family wife hcp bedside aware  over all prognosis poor.
pt seen and examine today see above plan - pt Chronic, s/p esophageal varices banding   with liver cirrhosis  ascites  - Used to drink alcohol years ago  , AST 50 and Alk phos 138; ALT WNL- CT A/P: Total situs inversus. Cirrhosis with a dominant liver mass concerning for primary HCC versus metastasis. Peritoneal carcinomatosis and moderate volume ascites- - Started  Lasix  20 mg  iv and spironolactone 25 mg po daily ,  going IR paracentesis today   .
pt seen and examine today see above plan - pt Chronic, s/p esophageal varices banding   with liver cirrhosis  ascites  - Used to drink alcohol years ago  , AST 50 and Alk phos 138; ALT WNL- CT A/P: Total situs inversus. Cirrhosis with a dominant liver mass concerning for primary HCC versus metastasis   Peritoneal carcinomatosis and moderate volume ascites-   but afp level  wnl  [ family  aware will need out pt liver biopsy]  - s/p  iv 40  mg  Lasix    , on spironolactone 25 mg po daily ,  s/p IR paracentesis - 4750cc of tram ascitic fluid removed most likely Transudative    - fluid cult so far neg   cyto pathology   +   suspicion  NEOPLASM ,   leucocytosis  post  procedure  reactive  fu trend wbc .
pt seen and examine today see above plan - pt Chronic, s/p esophageal varices banding   with liver cirrhosis  ascites  - Used to drink alcohol years ago  , AST 50 and Alk phos 138; ALT WNL- CT A/P: Total situs inversus. Cirrhosis with a dominant liver mass concerning for primary HCC versus metastasis   Peritoneal carcinomatosis and moderate volume ascites-   but afp level  wnl  [ family  aware will need out pt liver biopsy]  - s/p  iv 40  mg  Lasix    , on spironolactone 25 mg po daily ,  s/p IR paracentesis - 4750cc of tram ascitic fluid removed    - fluid cult so far neg .

## 2023-08-26 NOTE — PROGRESS NOTE ADULT - ASSESSMENT
67 y/o Mele-speaking, refused , Son Ang translated at bedside. Patient has history of liver cirrhosis s/p esophageal varices banding, situs inversus, HF?, bradycardia, HTN, HLD, T2DM, seizure, GERD presents to ED due to subjective fever for the past 4-5 days associated with weakness, malaise, poor appetite and abdominal bloating, symptoms kept persistently worsening and yesterday he started to have LUQ pain,    wheezing  HTN  DM  Cirrhosis  GERD  Liver mass  esoph varices    4750 cc drained - s/p paracentesis  vs noted  cm follow up reviewed    ct imaging reviewed  GI eval  cirrhosis - liver mass  MAIA - I and O - serial labs - replete lytes  lung bases - clear -   wheezing - episodic - on NEBS prn  monitor VS and HD and Sat  GOC discussion  DM care  serial FS  prognosis guarded  emp ABX in progress - eval for SBP

## 2023-08-26 NOTE — PROGRESS NOTE ADULT - NUTRITIONAL ASSESSMENT
This patient has been assessed with a concern for Malnutrition and has been determined to have a diagnosis/diagnoses of Underweight (BMI < 19).    This patient is being managed with:   Diet Regular-  Consistent Carbohydrate {Evening Snack}  DASH/TLC {Sodium & Cholesterol Restricted}  1500mL Fluid Restriction ([id number])  No Concentrated Potassium  No Beef  Entered: Aug 24 2023 11:13AM  
This patient has been assessed with a concern for Malnutrition and has been determined to have a diagnosis/diagnoses of Underweight (BMI < 19).    This patient is being managed with:   Diet Regular-  Consistent Carbohydrate {Evening Snack}  DASH/TLC {Sodium & Cholesterol Restricted}  1500mL Fluid Restriction ([id number])  No Concentrated Potassium  No Beef  Entered: Aug 24 2023 11:13AM  
This patient has been assessed with a concern for Malnutrition and has been determined to have a diagnosis/diagnoses of Underweight (BMI < 19).    This patient is being managed with:   Diet NPO-  Entered: Aug 26 2023  2:33PM

## 2023-08-26 NOTE — PROGRESS NOTE ADULT - PROBLEM SELECTOR PLAN 1
Chronic, s/p esophageal varices banding in his country [facility]    - Used to drink alcohol years ago   - AST 54 and Alk phos 133; ALT WNL on admission. - Worsened today (, ) possibly hepatorenal?  - CT A/P: Total situs inversus. Cirrhosis with a dominant liver mass concerning for primary HCC versus metastasis. Peritoneal carcinomatosis and moderate volume ascites.  - IR paracentesis on 8/23 - 4750cc of tram ascitic fluid removed from right abdomen  - Ascites fluid analysis: protein 2.1, albumin 0.9, ; PMN leukocytes noted but no organisms  - SAAG > 1.1, could be portal HTN from cirrhosis, but still suspicious of mets.   - F/u IR peritoneal fluid culture - so far neg   - Peritoneal fluid cytopathology: Suspicious for neoplasm. Few atypical cells mixed with lymphocytes, granulocytes, histiocytes and mesothelial cells. Pending immunohistochemical analysis  - Hypoalbuminemia at 1.9 - gave albumin IVPB before paracentesis, given another today (total x2)  - Leukocytosis uptrending likely reactive from post paracentesis given Pt completed rocephin for possible SBP. Currently afebrile, will monitor.  - Continue rifaximin 550mg BID as per GI  - S/p lasix 20mg IV x2 for ascites (8/23) - SOB resolved  - dced spironolactone as Pt found to be hyperkalemic at 6.8  - Hold hepatotoxic meds  - GI (Dr. Devi) consulted  - Further management as per ICU Chronic, s/p esophageal varices banding in his country [facility]    - Used to drink alcohol years ago   - AST 54 and Alk phos 133; ALT WNL on admission. - Worsened today (, ) possibly hepatorenal?  - CT A/P: Total situs inversus. Cirrhosis with a dominant liver mass concerning for primary HCC versus metastasis. Peritoneal carcinomatosis and moderate volume ascites.  - IR paracentesis on 8/23 - 4750cc of tram ascitic fluid removed from right abdomen  - Ascites fluid analysis: protein 2.1, albumin 0.9, ; PMN leukocytes noted but no organisms  - SAAG > 1.1, could be portal HTN from cirrhosis, but still suspicious of mets.   - F/u IR peritoneal fluid culture - so far neg   - Peritoneal fluid cytopathology: Suspicious for neoplasm. Few atypical cells mixed with lymphocytes, granulocytes, histiocytes and mesothelial cells. Pending immunohistochemical analysis  - Hypoalbuminemia at 1.9 - gave albumin IVPB before paracentesis, given another today (total x2)  - Leukocytosis uptrending likely reactive from post paracentesis given Pt completed rocephin for prophy SBP    , no  sbp    this time   fluid cult neg  Currently afebrile, will monitor.  - Continue rifaximin 550mg BID as per GI  - S/p lasix 20mg IV x2 for ascites (8/23) - SOB resolved  - dced spironolactone as Pt found to be hyperkalemic at 6.8  - Hold hepatotoxic meds  - GI (Dr. Devi) consulted  - Further management as per ICU

## 2023-08-26 NOTE — PROGRESS NOTE ADULT - PROBLEM SELECTOR PLAN 12
Concern for starting A/C on admission as pt reported hematochezia  - continue heparin 5000U q12h    #GOC discussion - full code

## 2023-08-26 NOTE — PROGRESS NOTE ADULT - SUBJECTIVE AND OBJECTIVE BOX
Subjective: moved to ICU this morning after developed severe hypotension. Awaiting A-line, TLC. Have not received crystalloid yet.   K 6.8 noted. HyperK protocol ordered.       MEDICATIONS  (STANDING):  albumin human 25% IVPB 50 milliLiter(s) IV Intermittent once  calcium gluconate IVPB 2 Gram(s) IV Intermittent once  dextrose 5%. 1000 milliLiter(s) (50 mL/Hr) IV Continuous <Continuous>  dextrose 5%. 1000 milliLiter(s) (100 mL/Hr) IV Continuous <Continuous>  dextrose 50% Injectable 25 Gram(s) IV Push once  dextrose 50% Injectable 12.5 Gram(s) IV Push once  dextrose 50% Injectable 25 Gram(s) IV Push once  dextrose 50% Injectable 50 milliLiter(s) IV Push once  dextrose 50% Injectable 50 milliLiter(s) IV Push once  glucagon  Injectable 1 milliGRAM(s) IntraMuscular once  heparin   Injectable 5000 Unit(s) SubCutaneous every 12 hours  insulin glargine Injectable (LANTUS) 15 Unit(s) SubCutaneous every morning  insulin lispro (ADMELOG) corrective regimen sliding scale   SubCutaneous at bedtime  insulin lispro (ADMELOG) corrective regimen sliding scale   SubCutaneous three times a day before meals  insulin lispro Injectable (ADMELOG) 6 Unit(s) SubCutaneous before breakfast  insulin lispro Injectable (ADMELOG) 6 Unit(s) SubCutaneous before dinner  insulin lispro Injectable (ADMELOG) 6 Unit(s) SubCutaneous before lunch  insulin regular  human recombinant 5 Unit(s) IV Push once  metoprolol tartrate 25 milliGRAM(s) Oral two times a day  midodrine 2.5 milliGRAM(s) Oral <User Schedule>  phenytoin   Capsule 100 milliGRAM(s) Oral daily  rifAXIMin 550 milliGRAM(s) Oral two times a day  senna 2 Tablet(s) Oral at bedtime  simethicone 80 milliGRAM(s) Chew daily  sodium chloride 0.45% 1000 milliLiter(s) (100 mL/Hr) IV Continuous <Continuous>  sodium chloride 0.9% Bolus 500 milliLiter(s) IV Bolus once  sodium zirconium cyclosilicate 10 Gram(s) Oral once    MEDICATIONS  (PRN):  albuterol/ipratropium for Nebulization 3 milliLiter(s) Nebulizer every 6 hours PRN Shortness of Breath and/or Wheezing  melatonin 3 milliGRAM(s) Oral at bedtime PRN Insomnia          T(C): 36.7 (08-26-23 @ 04:46), Max: 36.7 (08-26-23 @ 04:46)  HR: 96 (08-26-23 @ 04:46) (83 - 105)  BP: 101/72 (08-26-23 @ 04:46) (85/64 - 120/89)  RR: 17 (08-26-23 @ 04:46) (17 - 18)  SpO2: 97% (08-26-23 @ 04:46) (94% - 97%)  Wt(kg): --        I&O's Detail           PHYSICAL EXAM:    GENERAL: tachypneic, Chayne Stoking  CHEST/LUNG: Clear  HEART: S1S2  ABDOMEN: Soft, distended  EXTREMITIES:  no edema.       LABS:  CBC Full  -  ( 26 Aug 2023 07:36 )  WBC Count : 20.89 K/uL  RBC Count : 5.66 M/uL  Hemoglobin : 15.4 g/dL  Hematocrit : 46.5 %  Platelet Count - Automated : 468 K/uL  Mean Cell Volume : 82.2 fl  Mean Cell Hemoglobin : 27.2 pg  Mean Cell Hemoglobin Concentration : 33.1 gm/dL  Auto Neutrophil # : 17.76 K/uL  Auto Lymphocyte # : 1.67 K/uL  Auto Monocyte # : 1.04 K/uL  Auto Eosinophil # : 0.21 K/uL  Auto Basophil # : 0.00 K/uL  Auto Neutrophil % : 85.0 %  Auto Lymphocyte % : 8.0 %  Auto Monocyte % : 5.0 %  Auto Eosinophil % : 1.0 %  Auto Basophil % : 0.0 %    08-26    121<L>  |  94<L>  |  68<H>  ----------------------------<  138<H>  6.8<HH>   |  12<L>  |  2.70<H>    Ca    8.5      26 Aug 2023 07:36  Phos  4.3     08-25  Mg     2.6     08-25    TPro  7.5  /  Alb  1.9<L>  /  TBili  0.6  /  DBili  x   /  AST  761<H>  /  ALT  270<H>  /  AlkPhos  178<H>  08-26          Impression:  * MAIA on CKD3. DDx per prior notes by Dr Harper. Now with a new prob ischemic insult  * HyperK -- due to the above.   * AG and NAGMA.   * HypoNa -- MAIA, paraneoplastic SIADH, high ADH state of cirrosis  * Hepatoma, cirrhosis, peritoneal carcinomatosis. S/p LVP 5L  * Systolic CM    Recommendations:   Na Bicarb 50meq IVP STAT  IV Bicarb as Rxed  He is less than ideal dialytic candidate in v/o shock, terminal dx, systolic CM  Volume management per PCCM  GOC to be established.

## 2023-08-26 NOTE — PROGRESS NOTE ADULT - PROBLEM SELECTOR PROBLEM 1
T2DM (type 2 diabetes mellitus)
Cirrhosis of liver with ascites
T2DM (type 2 diabetes mellitus)
Cirrhosis of liver with ascites
Cirrhosis of liver with ascites

## 2023-08-26 NOTE — PROGRESS NOTE ADULT - PROBLEM SELECTOR PLAN 8
Patient reports one episode of red bright blood in the stools, denies history of hemorrhoids.   - Patient had 1 bloody BM since admission (8/23)  - No further gi bleed noticed   - No intervention at this time as Hgb is WNL - f/u H/H closely   - GI (Dr. Devi) following

## 2023-08-27 NOTE — PROVIDER CONTACT NOTE (EICU) - RECOMMENDATIONS
ROSC achieved, however, given underlying issues and on max doses of norepi would not advocate for any further ACLS as it will not reverse the dying process. Bedside team in agreement and PA brought family in to say goodbye.

## 2023-08-27 NOTE — PROVIDER CONTACT NOTE (CRITICAL VALUE NOTIFICATION) - NAME OF MD/NP/PA/DO NOTIFIED:
ICU CC MD Dr. Rangel
Dr. Sweet
Lico PRUETT
NOY DAVALOS
NOY DAVALOS
SEBLE Ruby
gayatri le
SEBLE Rocha

## 2023-08-27 NOTE — DISCHARGE NOTE FOR THE EXPIRED PATIENT - HOSPITAL COURSE
68 year old male with situs inversus, heart failure, cirrhosis c/b esophageal varices s/p banding, CKD3, and seizure disorder presented with fever, weakness, and abdominal bloating for several days. Found to have ascites as well as a liver mass concerning for malignancy and evidence of peritoneal carcinomatosis on imaging. Underwent paracentesis 4/23 with removal of 4750 mL fluid. ICU consulted today for hypotension and altered mental status. Labs shows worsening renal failure and transaminitis. Transferred to ICU for further management.  ICU course complained by Hepatic encephalopathy  Decompensated hepatic cirrhosis, Transaminitis, Acute renal failure on CKD3 - ischemic ATN, hepatorenal syndrome, Shock - septic / distributive, Liver Mass, Peritoneal carcinomatosis.  Had Cardiac Arrest in ICU and was Intubated and eventually was pronounced dead 0259 hours 8/27/2023.   68 year old male with situs inversus, heart failure, cirrhosis c/b esophageal varices s/p banding, CKD3, and seizure disorder presented with fever, weakness, and abdominal bloating for several days. Found to have ascites as well as a liver mass concerning for malignancy and evidence of peritoneal carcinomatosis on imaging. Underwent paracentesis 4/23 with removal of 4750 mL fluid. ICU consulted today for hypotension and altered mental status. Labs shows worsening renal failure and transaminitis. Transferred to ICU for further management. Patient had hepatic encephalopathy, decompensated hepatic cirrhosis, transaminitis, acute renal failure, and shock requiring escalating doses of vasopressors. Patient had cardiac arrest in ICU. Underwent CPR and was intubated. Despite efforts he passed away and was pronounced dead 0259 hours 8/27/2023. Family at bedside.

## 2023-08-27 NOTE — CHART NOTE - NSCHARTNOTEFT_GEN_A_CORE
Critical Care [facility]     Called to bedside Patient with Bradycardia to Asystolic Cardiac Arrest 0136hrs.    ACLS initiated ( See separate Code documentation.)    Epinephrine, Sodium Bicarb, Insulin and Dextrose were given along with High Quality CPR.    Intubated by myself  ( see Separate procedure note)    ROSC achieved 0147 hours, Patient placed on Ventilator.

## 2023-08-27 NOTE — PROVIDER CONTACT NOTE (EICU) - BACKGROUND
Pt w/ multi organ system dysfunction in setting of known heart failure, cirrhosis. On admission found to have liver mass and carcinomatosis. Also developed MAIA on CKD. transferred to ICU for hypotension requiring pressors. Earlier this evening labs worsening w/ rising lactate, hyperK, rising LFTs and worsening acidosis. Bedside team aware and giving family chance to spend time with pt. Cardiac arrest occurred and was on camera during ACLS.

## 2023-08-27 NOTE — PROCEDURE NOTE - NSPROCDETAILS_GEN_ALL_CORE
location identified, draped/prepped, sterile technique used, needle inserted/introduced/positive blood return obtained via catheter/connected to a pressurized flush line/sutured in place/hemostasis with direct pressure, dressing applied/Seldinger technique/all materials/supplies accounted for at end of procedure
guidewire recovered/lumen(s) aspirated and flushed/sterile dressing applied/sterile technique, catheter placed/ultrasound guidance with use of sterile gel and probe cove
Cardaic arrest/difficult/crash intubation

## 2023-08-27 NOTE — PROCEDURE NOTE - NSINDICATIONS_GEN_A_CORE
cardiac arrest
arterial puncture to obtain ABG's/blood sampling/critical patient/monitoring purposes
critical illness/emergency venous access

## 2023-08-27 NOTE — CHART NOTE - NSCHARTNOTEFT_GEN_A_CORE
Critical Care [facility] - [facility]       Patient again became Pulseless Despite Max Pressor support and Ventilator Support.    Discussed with Family and they understand that further resuscitative measure would Not be beneficial.    Patient is unresponsive to verbal and tactile stimuli.      Patient is not breathing off ventilator.  No air entry heard.  No pulses felt.      No heart sounds heard.  Pupils are fixed and dilated bilaterally.      Patient pronounced dead at 0259 hours August 27th 2023.      Wife and Son are at bedside at time of death.  Support given and questions answered.    Geovanni Gomez PAC Critical Care [facility]       Patient again became Pulseless Despite Max Pressor support and Ventilator Support.    Discussed with Family and they understand that further resuscitative measure would Not be beneficial.    Patient is unresponsive to verbal and tactile stimuli.      Patient is not breathing off ventilator.  No air entry heard.  No pulses felt.      No heart sounds heard.  Pupils are fixed and dilated bilaterally.      Patient pronounced dead in PEA at 0259 hours August 27th 2023.      Wife and Son are at bedside at time of death.  Support given and questions answered.    Geovanni Gomez PAC

## 2023-08-27 NOTE — PROVIDER CONTACT NOTE (EICU) - ACTION/TREATMENT ORDERED:
No further resuscitation to take place.
-order placed as requested   -teleICU team to continue to monitor and provide support for patient and bedside team as needed

## 2023-08-28 LAB
CULTURE RESULTS: SIGNIFICANT CHANGE UP
SPECIMEN SOURCE: SIGNIFICANT CHANGE UP

## 2023-09-01 LAB
CULTURE RESULTS: SIGNIFICANT CHANGE UP
CULTURE RESULTS: SIGNIFICANT CHANGE UP
SPECIMEN SOURCE: SIGNIFICANT CHANGE UP
SPECIMEN SOURCE: SIGNIFICANT CHANGE UP

## 2025-05-09 NOTE — PROCEDURE NOTE - ADDITIONAL PROCEDURE DETAILS
Shock, Encephalopathy, Shock liver, Hepatorenal syndrome   Non-inclusive of E&M
Hypotension, AMS, unresponsive, decompensated cirrhosis   Non-inclusive of E&M
Dx Liver Mass / Shock poss Septic / ARF / Lactic Acidosis / Cardiac Arrest / Acute Hypoxic Respiratory Failure
DC instructions